# Patient Record
Sex: FEMALE | Race: WHITE | Employment: OTHER | URBAN - METROPOLITAN AREA
[De-identification: names, ages, dates, MRNs, and addresses within clinical notes are randomized per-mention and may not be internally consistent; named-entity substitution may affect disease eponyms.]

---

## 2017-04-16 ENCOUNTER — APPOINTMENT (OUTPATIENT)
Dept: CT IMAGING | Age: 82
DRG: 391 | End: 2017-04-16
Attending: PHYSICIAN ASSISTANT
Payer: MEDICARE

## 2017-04-16 ENCOUNTER — HOSPITAL ENCOUNTER (INPATIENT)
Age: 82
LOS: 10 days | Discharge: NURSING, REHAB OR LONG-TERM CARE | DRG: 391 | End: 2017-04-26
Attending: EMERGENCY MEDICINE | Admitting: INTERNAL MEDICINE
Payer: MEDICARE

## 2017-04-16 DIAGNOSIS — R10.84 ABDOMINAL PAIN, GENERALIZED: ICD-10-CM

## 2017-04-16 DIAGNOSIS — N17.9 ACUTE RENAL FAILURE, UNSPECIFIED ACUTE RENAL FAILURE TYPE (HCC): Primary | ICD-10-CM

## 2017-04-16 PROBLEM — R65.10 SIRS (SYSTEMIC INFLAMMATORY RESPONSE SYNDROME) (HCC): Status: ACTIVE | Noted: 2017-04-16

## 2017-04-16 PROBLEM — Z79.01 CHRONIC ANTICOAGULATION: Status: ACTIVE | Noted: 2017-04-16

## 2017-04-16 PROBLEM — I48.91 A-FIB (HCC): Chronic | Status: ACTIVE | Noted: 2017-04-16

## 2017-04-16 PROBLEM — K52.9 ENTERITIS: Status: ACTIVE | Noted: 2017-04-16

## 2017-04-16 PROBLEM — R79.89 ELEVATED LACTIC ACID LEVEL: Status: ACTIVE | Noted: 2017-04-16

## 2017-04-16 PROBLEM — G47.33 OSA (OBSTRUCTIVE SLEEP APNEA): Chronic | Status: ACTIVE | Noted: 2017-04-16

## 2017-04-16 PROBLEM — I48.91 A-FIB (HCC): Status: ACTIVE | Noted: 2017-04-16

## 2017-04-16 PROBLEM — N18.30 CKD (CHRONIC KIDNEY DISEASE) STAGE 3, GFR 30-59 ML/MIN (HCC): Chronic | Status: ACTIVE | Noted: 2017-04-16

## 2017-04-16 PROBLEM — Z79.01 CHRONIC ANTICOAGULATION: Chronic | Status: ACTIVE | Noted: 2017-04-16

## 2017-04-16 PROBLEM — D68.9 COAGULOPATHY (HCC): Status: ACTIVE | Noted: 2017-04-16

## 2017-04-16 LAB
ABO + RH BLD: NORMAL
ALBUMIN SERPL BCP-MCNC: 3.2 G/DL (ref 3.5–5)
ALBUMIN/GLOB SERPL: 0.8 {RATIO} (ref 1.1–2.2)
ALP SERPL-CCNC: 55 U/L (ref 45–117)
ALT SERPL-CCNC: 20 U/L (ref 12–78)
ANION GAP BLD CALC-SCNC: 11 MMOL/L (ref 5–15)
APPEARANCE UR: ABNORMAL
APTT PPP: >130 SEC (ref 22.1–32.5)
AST SERPL W P-5'-P-CCNC: 20 U/L (ref 15–37)
BACTERIA URNS QL MICRO: ABNORMAL /HPF
BASOPHILS # BLD AUTO: 0 K/UL (ref 0–0.1)
BASOPHILS # BLD: 0 % (ref 0–1)
BILIRUB SERPL-MCNC: 0.7 MG/DL (ref 0.2–1)
BILIRUB UR QL: NEGATIVE
BLOOD GROUP ANTIBODIES SERPL: NORMAL
BUN SERPL-MCNC: 46 MG/DL (ref 6–20)
BUN/CREAT SERPL: 20 (ref 12–20)
CALCIUM SERPL-MCNC: 9.2 MG/DL (ref 8.5–10.1)
CHLORIDE SERPL-SCNC: 101 MMOL/L (ref 97–108)
CO2 SERPL-SCNC: 27 MMOL/L (ref 21–32)
COLOR UR: ABNORMAL
CREAT SERPL-MCNC: 2.25 MG/DL (ref 0.55–1.02)
EOSINOPHIL # BLD: 0 K/UL (ref 0–0.4)
EOSINOPHIL NFR BLD: 0 % (ref 0–7)
EPITH CASTS URNS QL MICRO: ABNORMAL /LPF
ERYTHROCYTE [DISTWIDTH] IN BLOOD BY AUTOMATED COUNT: 14.7 % (ref 11.5–14.5)
GLOBULIN SER CALC-MCNC: 3.9 G/DL (ref 2–4)
GLUCOSE BLD STRIP.AUTO-MCNC: 145 MG/DL (ref 65–100)
GLUCOSE SERPL-MCNC: 208 MG/DL (ref 65–100)
GLUCOSE UR STRIP.AUTO-MCNC: NEGATIVE MG/DL
HCT VFR BLD AUTO: 35.5 % (ref 35–47)
HGB BLD-MCNC: 12 G/DL (ref 11.5–16)
HGB UR QL STRIP: NEGATIVE
INR PPP: 9.1 (ref 0.9–1.1)
KETONES UR QL STRIP.AUTO: NEGATIVE MG/DL
LACTATE SERPL-SCNC: 2.2 MMOL/L (ref 0.4–2)
LEUKOCYTE ESTERASE UR QL STRIP.AUTO: ABNORMAL
LIPASE SERPL-CCNC: 139 U/L (ref 73–393)
LYMPHOCYTES # BLD AUTO: 9 % (ref 12–49)
LYMPHOCYTES # BLD: 1.3 K/UL (ref 0.8–3.5)
MCH RBC QN AUTO: 30.5 PG (ref 26–34)
MCHC RBC AUTO-ENTMCNC: 33.8 G/DL (ref 30–36.5)
MCV RBC AUTO: 90.1 FL (ref 80–99)
MONOCYTES # BLD: 1.4 K/UL (ref 0–1)
MONOCYTES NFR BLD AUTO: 9 % (ref 5–13)
NEUTS SEG # BLD: 12.1 K/UL (ref 1.8–8)
NEUTS SEG NFR BLD AUTO: 82 % (ref 32–75)
NITRITE UR QL STRIP.AUTO: NEGATIVE
PH UR STRIP: 5 [PH] (ref 5–8)
PLATELET # BLD AUTO: 263 K/UL (ref 150–400)
POTASSIUM SERPL-SCNC: 4 MMOL/L (ref 3.5–5.1)
PROT SERPL-MCNC: 7.1 G/DL (ref 6.4–8.2)
PROT UR STRIP-MCNC: NEGATIVE MG/DL
PROTHROMBIN TIME: 98.9 SEC (ref 9–11.1)
RBC # BLD AUTO: 3.94 M/UL (ref 3.8–5.2)
RBC #/AREA URNS HPF: ABNORMAL /HPF (ref 0–5)
SERVICE CMNT-IMP: ABNORMAL
SODIUM SERPL-SCNC: 139 MMOL/L (ref 136–145)
SP GR UR REFRACTOMETRY: 1.02 (ref 1–1.03)
SPECIMEN EXP DATE BLD: NORMAL
THERAPEUTIC RANGE,PTTT: ABNORMAL SECS (ref 58–77)
TROPONIN I SERPL-MCNC: <0.04 NG/ML
UA: UC IF INDICATED,UAUC: ABNORMAL
UROBILINOGEN UR QL STRIP.AUTO: 0.2 EU/DL (ref 0.2–1)
WBC # BLD AUTO: 14.8 K/UL (ref 3.6–11)
WBC URNS QL MICRO: ABNORMAL /HPF (ref 0–4)

## 2017-04-16 PROCEDURE — 85025 COMPLETE CBC W/AUTO DIFF WBC: CPT | Performed by: NURSE PRACTITIONER

## 2017-04-16 PROCEDURE — 87086 URINE CULTURE/COLONY COUNT: CPT | Performed by: NURSE PRACTITIONER

## 2017-04-16 PROCEDURE — 74011250636 HC RX REV CODE- 250/636: Performed by: EMERGENCY MEDICINE

## 2017-04-16 PROCEDURE — 80053 COMPREHEN METABOLIC PANEL: CPT | Performed by: NURSE PRACTITIONER

## 2017-04-16 PROCEDURE — 96375 TX/PRO/DX INJ NEW DRUG ADDON: CPT

## 2017-04-16 PROCEDURE — 65270000029 HC RM PRIVATE

## 2017-04-16 PROCEDURE — 74011000258 HC RX REV CODE- 258: Performed by: EMERGENCY MEDICINE

## 2017-04-16 PROCEDURE — 85610 PROTHROMBIN TIME: CPT | Performed by: EMERGENCY MEDICINE

## 2017-04-16 PROCEDURE — 85730 THROMBOPLASTIN TIME PARTIAL: CPT | Performed by: EMERGENCY MEDICINE

## 2017-04-16 PROCEDURE — 87077 CULTURE AEROBIC IDENTIFY: CPT | Performed by: NURSE PRACTITIONER

## 2017-04-16 PROCEDURE — 82962 GLUCOSE BLOOD TEST: CPT

## 2017-04-16 PROCEDURE — 83605 ASSAY OF LACTIC ACID: CPT | Performed by: NURSE PRACTITIONER

## 2017-04-16 PROCEDURE — 74011250637 HC RX REV CODE- 250/637: Performed by: INTERNAL MEDICINE

## 2017-04-16 PROCEDURE — 36415 COLL VENOUS BLD VENIPUNCTURE: CPT | Performed by: NURSE PRACTITIONER

## 2017-04-16 PROCEDURE — 74011250636 HC RX REV CODE- 250/636: Performed by: PHYSICIAN ASSISTANT

## 2017-04-16 PROCEDURE — 83036 HEMOGLOBIN GLYCOSYLATED A1C: CPT | Performed by: INTERNAL MEDICINE

## 2017-04-16 PROCEDURE — 74176 CT ABD & PELVIS W/O CONTRAST: CPT

## 2017-04-16 PROCEDURE — 93005 ELECTROCARDIOGRAM TRACING: CPT

## 2017-04-16 PROCEDURE — 74011250636 HC RX REV CODE- 250/636: Performed by: INTERNAL MEDICINE

## 2017-04-16 PROCEDURE — 99285 EMERGENCY DEPT VISIT HI MDM: CPT

## 2017-04-16 PROCEDURE — 81001 URINALYSIS AUTO W/SCOPE: CPT | Performed by: NURSE PRACTITIONER

## 2017-04-16 PROCEDURE — 86900 BLOOD TYPING SEROLOGIC ABO: CPT | Performed by: EMERGENCY MEDICINE

## 2017-04-16 PROCEDURE — 96374 THER/PROPH/DIAG INJ IV PUSH: CPT

## 2017-04-16 PROCEDURE — 83690 ASSAY OF LIPASE: CPT | Performed by: NURSE PRACTITIONER

## 2017-04-16 PROCEDURE — 96361 HYDRATE IV INFUSION ADD-ON: CPT

## 2017-04-16 PROCEDURE — 84484 ASSAY OF TROPONIN QUANT: CPT | Performed by: PHYSICIAN ASSISTANT

## 2017-04-16 PROCEDURE — 87186 SC STD MICRODIL/AGAR DIL: CPT | Performed by: NURSE PRACTITIONER

## 2017-04-16 RX ORDER — OXYCODONE AND ACETAMINOPHEN 5; 325 MG/1; MG/1
1 TABLET ORAL
Status: DISCONTINUED | OUTPATIENT
Start: 2017-04-16 | End: 2017-04-21

## 2017-04-16 RX ORDER — ONDANSETRON 2 MG/ML
4 INJECTION INTRAMUSCULAR; INTRAVENOUS
Status: COMPLETED | OUTPATIENT
Start: 2017-04-16 | End: 2017-04-16

## 2017-04-16 RX ORDER — MAGNESIUM SULFATE 100 %
4 CRYSTALS MISCELLANEOUS AS NEEDED
Status: DISCONTINUED | OUTPATIENT
Start: 2017-04-16 | End: 2017-04-26 | Stop reason: HOSPADM

## 2017-04-16 RX ORDER — SODIUM CHLORIDE 9 MG/ML
100 INJECTION, SOLUTION INTRAVENOUS CONTINUOUS
Status: DISCONTINUED | OUTPATIENT
Start: 2017-04-16 | End: 2017-04-20

## 2017-04-16 RX ORDER — ACETAMINOPHEN 325 MG/1
650 TABLET ORAL
Status: DISCONTINUED | OUTPATIENT
Start: 2017-04-16 | End: 2017-04-26 | Stop reason: HOSPADM

## 2017-04-16 RX ORDER — NYSTATIN 100000 [USP'U]/G
POWDER TOPICAL
COMMUNITY

## 2017-04-16 RX ORDER — OMEPRAZOLE 20 MG/1
40 CAPSULE, DELAYED RELEASE ORAL DAILY
Status: DISCONTINUED | OUTPATIENT
Start: 2017-04-17 | End: 2017-04-16 | Stop reason: CLARIF

## 2017-04-16 RX ORDER — INSULIN LISPRO 100 [IU]/ML
INJECTION, SOLUTION INTRAVENOUS; SUBCUTANEOUS
Status: DISCONTINUED | OUTPATIENT
Start: 2017-04-16 | End: 2017-04-26 | Stop reason: HOSPADM

## 2017-04-16 RX ORDER — PRAVASTATIN SODIUM 20 MG/1
40 TABLET ORAL
Status: DISCONTINUED | OUTPATIENT
Start: 2017-04-16 | End: 2017-04-26 | Stop reason: HOSPADM

## 2017-04-16 RX ORDER — METOPROLOL SUCCINATE 25 MG/1
12.5 TABLET, EXTENDED RELEASE ORAL DAILY
Status: DISCONTINUED | OUTPATIENT
Start: 2017-04-17 | End: 2017-04-21

## 2017-04-16 RX ORDER — SODIUM CHLORIDE 0.9 % (FLUSH) 0.9 %
5-10 SYRINGE (ML) INJECTION EVERY 8 HOURS
Status: DISCONTINUED | OUTPATIENT
Start: 2017-04-16 | End: 2017-04-20

## 2017-04-16 RX ORDER — KETOROLAC TROMETHAMINE 30 MG/ML
15 INJECTION, SOLUTION INTRAMUSCULAR; INTRAVENOUS
Status: COMPLETED | OUTPATIENT
Start: 2017-04-16 | End: 2017-04-16

## 2017-04-16 RX ORDER — TRIAMCINOLONE ACETONIDE 1 MG/G
CREAM TOPICAL
COMMUNITY

## 2017-04-16 RX ORDER — DEXTROSE 50 % IN WATER (D50W) INTRAVENOUS SYRINGE
12.5-25 AS NEEDED
Status: DISCONTINUED | OUTPATIENT
Start: 2017-04-16 | End: 2017-04-26

## 2017-04-16 RX ORDER — HYDROMORPHONE HYDROCHLORIDE 1 MG/ML
0.5 INJECTION, SOLUTION INTRAMUSCULAR; INTRAVENOUS; SUBCUTANEOUS
Status: DISCONTINUED | OUTPATIENT
Start: 2017-04-16 | End: 2017-04-20

## 2017-04-16 RX ORDER — ACETAMINOPHEN 325 MG/1
TABLET ORAL
COMMUNITY

## 2017-04-16 RX ORDER — WARFARIN 2.5 MG/1
2.5 TABLET ORAL
Status: ON HOLD | COMMUNITY
End: 2017-04-26

## 2017-04-16 RX ORDER — PANTOPRAZOLE SODIUM 40 MG/1
40 TABLET, DELAYED RELEASE ORAL DAILY
Status: DISCONTINUED | OUTPATIENT
Start: 2017-04-17 | End: 2017-04-26 | Stop reason: HOSPADM

## 2017-04-16 RX ORDER — FEBUXOSTAT 40 MG/1
40 TABLET, FILM COATED ORAL DAILY
Status: DISCONTINUED | OUTPATIENT
Start: 2017-04-17 | End: 2017-04-26 | Stop reason: HOSPADM

## 2017-04-16 RX ORDER — SODIUM CHLORIDE 0.9 % (FLUSH) 0.9 %
5-10 SYRINGE (ML) INJECTION AS NEEDED
Status: DISCONTINUED | OUTPATIENT
Start: 2017-04-16 | End: 2017-04-26 | Stop reason: HOSPADM

## 2017-04-16 RX ADMIN — PIPERACILLIN SODIUM,TAZOBACTAM SODIUM 3.38 G: 3; .375 INJECTION, POWDER, FOR SOLUTION INTRAVENOUS at 20:16

## 2017-04-16 RX ADMIN — PHYTONADIONE 2.5 MG: 10 INJECTION, EMULSION INTRAMUSCULAR; INTRAVENOUS; SUBCUTANEOUS at 23:46

## 2017-04-16 RX ADMIN — SODIUM CHLORIDE 100 ML/HR: 900 INJECTION, SOLUTION INTRAVENOUS at 22:18

## 2017-04-16 RX ADMIN — SODIUM CHLORIDE 1000 ML: 900 INJECTION, SOLUTION INTRAVENOUS at 19:24

## 2017-04-16 RX ADMIN — ONDANSETRON 4 MG: 2 INJECTION INTRAMUSCULAR; INTRAVENOUS at 17:28

## 2017-04-16 RX ADMIN — ACETAMINOPHEN 650 MG: 325 TABLET ORAL at 22:15

## 2017-04-16 RX ADMIN — SODIUM CHLORIDE 1000 ML: 900 INJECTION, SOLUTION INTRAVENOUS at 17:28

## 2017-04-16 RX ADMIN — KETOROLAC TROMETHAMINE 15 MG: 30 INJECTION, SOLUTION INTRAMUSCULAR at 17:28

## 2017-04-16 NOTE — ED PROVIDER NOTES
HPI Comments: 80 y.o. female with past medical history significant for HTN, diabetes, high cholesterol, sleep apnea, peptic ulcer disease, GERD, stroke, and arrhythmia who presents from home with chief complaint of abdominal pain. Pt complains of onset of periumbilical abdominal pain since yesterday which has been progressively worsening. She reports that the pain is constant, non-radiating, and worsens upon palpation. Pt also complains of nausea and vomiting and states that she has a reduced appetite. Pt reports that she feels nauseated every time she attempts to eat and reports 1 episode of vomiting since onset of pain. Pt has hx of peptic ulcers and states that her current symptoms resemble those of when she had ulcers. She also has hx of blood clot approximately 3 years ago. She denies taking any medication for pain. Pt denies having any dysuria or abnormal leg swelling. Patient's surgical history includes appendectomy and hysterectomy. There are no other acute medical concerns at this time. Social hx: nonsmoker, no EtOH use, no drug use  Significant FMHx: heart disease  PCP: Radha Rees MD    Note written by Maury Álvarez, as dictated by Franko Love PA-C 5:16 PM      The history is provided by the patient. No  was used.         Past Medical History:   Diagnosis Date    Arrhythmia     Irregular HR-\"beats fast\"    Diabetes ([address]     GERD (gastroesophageal reflux disease)     High cholesterol     Hx of blood clots     Hypertension     PUD (peptic ulcer disease)     Sleep apnea     Will not use mask    Stroke ([address]     TIA       Past Surgical History:   Procedure Laterality Date    HX APPENDECTOMY      HX BACK SURGERY      lower back    HX HEMORRHOIDECTOMY      HX HYSTERECTOMY      HX ORTHOPAEDIC      knee surgery    HX ORTHOPAEDIC      carpal tunnel surgery    HX OTHER SURGICAL      HX OTHER SURGICAL  10/13/15    TTE: EF 55-60%, LA dilated         Family History:   Problem Relation Age of Onset    Parkinsonism Mother     Cancer Father      lung       Social History     Social History    Marital status:      Spouse name: N/A    Number of children: N/A    Years of education: N/A     Occupational History    Not on file. Social History Main Topics    Smoking status: Never Smoker    Smokeless tobacco: Never Used    Alcohol use No    Drug use: No    Sexual activity: Not on file     Other Topics Concern    Not on file     Social History Narrative         ALLERGIES: Accupril [quinapril]; Allopurinol; Demerol [meperidine]; Morphine; and Sulfamethoxazole-trimethoprim    Review of Systems   Constitutional: Negative for chills, diaphoresis and fever. HENT: Negative for congestion, postnasal drip, rhinorrhea and sore throat. Eyes: Negative for photophobia, discharge, redness and visual disturbance. Respiratory: Negative for cough, chest tightness, shortness of breath and wheezing. Cardiovascular: Negative for chest pain, palpitations and leg swelling. Gastrointestinal: Positive for abdominal pain, nausea and vomiting. Negative for abdominal distention, blood in stool, constipation and diarrhea. Genitourinary: Negative for difficulty urinating, dysuria, frequency, hematuria and urgency. Musculoskeletal: Negative for arthralgias, back pain, joint swelling and myalgias. Skin: Negative for color change and rash. Neurological: Negative for dizziness, speech difficulty, weakness, light-headedness, numbness and headaches. Psychiatric/Behavioral: Negative for confusion. The patient is not nervous/anxious. All other systems reviewed and are negative. Vitals:    04/16/17 1700   BP: 114/62   Pulse: (!) 113   Resp: 18   Temp: 98.2 °F (36.8 °C)   SpO2: 94%   Weight: 79.4 kg (175 lb)   Height: 5' 3\" (1.6 m)            Physical Exam   Constitutional: She is oriented to person, place, and time. She appears well-developed and well-nourished. No distress. HENT:   Head: Normocephalic and atraumatic. Right Ear: External ear normal.   Left Ear: External ear normal.   Nose: Nose normal.   Mouth/Throat: Oropharynx is clear and moist.   Eyes: Conjunctivae and EOM are normal. Pupils are equal, round, and reactive to light. No scleral icterus. Neck: Normal range of motion. Neck supple. No JVD present. No tracheal deviation present. No thyromegaly present. Cardiovascular: Normal rate, regular rhythm and normal heart sounds. Exam reveals no gallop and no friction rub. No murmur heard. Pulmonary/Chest: Effort normal and breath sounds normal. No respiratory distress. She has no wheezes. She has no rales. She exhibits no tenderness. Abdominal: Soft. Bowel sounds are normal. She exhibits no distension and no mass. There is tenderness (upon palpation) in the periumbilical area. There is no rebound and no guarding. Musculoskeletal: Normal range of motion. She exhibits no edema or tenderness. Lymphadenopathy:     She has no cervical adenopathy. Neurological: She is alert and oriented to person, place, and time. She has normal strength. She displays no atrophy and no tremor. No cranial nerve deficit. She exhibits normal muscle tone. Coordination and gait normal.   Skin: Skin is warm and dry. No rash noted. She is not diaphoretic. No erythema. Psychiatric: She has a normal mood and affect. Her behavior is normal. Judgment and thought content normal.   Nursing note and vitals reviewed. Note written by Maury Rios, as dictated by Laura Landa DO 5:16 PM    MDM  Number of Diagnoses or Management Options  Diagnosis management comments: Pt presents with mid abdominal pain. CT revealed small bowel thickening. Infections vs inflammatory vs ischemia. Spoke to hospitalist Dr. Gini Sandhoff who has agreed to admit the pt. Reviewed treatment plan with attending and they agree.   Vania Feliz PA-C      ED Course       Procedures

## 2017-04-16 NOTE — ED TRIAGE NOTES
Patient arrives with the complaint of mid abdominal pain since yesterday associated with some nausea and vomiting. States that the pain is constant, trying to eat does not increase the pain but causes nausea and vomiting. Denies fevers, chest pain and diarrhea.

## 2017-04-16 NOTE — PROGRESS NOTES
[facility]: MED RECONCILIATION    Comments/Recommendations:   Patient states he is compliant with taking his medication as prescribed. Medication(s) ADDED to PTA list:  1. Triamcinolone 0.1% cream prn    Medication(s) REMOVED from PTA list:  1. Glipizide SR 2.5 mg daily    Medication(s) ADJUSTED on PTA list:  1. Vit D frequency changed to \"daily\". 2. Nystatin powder changed to \"prn\". 3. Warfarin dose changed to 2.5 mg Sat/Sun/Tue/Thu and 5 mg Mon/Wed/Fri. Information obtained from: Home med list    Significant PMH/Disease States:   Past Medical History:   Diagnosis Date    Arrhythmia     Irregular HR-\"beats fast\"    Diabetes ([address]     GERD (gastroesophageal reflux disease)     High cholesterol     Hx of blood clots     Hypertension     PUD (peptic ulcer disease)     Sleep apnea     Will not use mask    Stroke [facility])     TIA       Chief Complaint for this Admission:   Chief Complaint   Patient presents with    Abdominal Pain    Nausea       Allergies: Accupril [quinapril]; Allopurinol; Demerol [meperidine]; Morphine; and Sulfamethoxazole-trimethoprim    Prior to Admission Medications:     Medication Documentation Review Audit      Prior to Admission Medications   Prescriptions Last Dose Informant Patient Reported? Taking?   acetaminophen (TYLENOL) 325 mg tablet 4/15/2017 at Unknown time  Yes Yes   Sig: Take  by mouth every four (4) hours as needed for Pain. ascorbic acid (VITAMIN C) 500 mg tablet 4/16/2017 at 0930  Yes Yes   Sig: Take 500 mg by mouth three (3) times daily. b complex vitamins tablet 4/16/2017 at 0930  Yes Yes   Sig: Take 1 Tab by mouth daily. calcium carbonate-vitamin D3 600 mg (1,500 mg)-800 unit chew 4/16/2017 at 0930  Yes Yes   Sig: Take 1 Tab by mouth daily. cholecalciferol, vitamin D3, (VITAMIN D3) 2,000 unit tab 4/16/2017 at 0930  Yes Yes   Sig: Take 1 Tab by mouth daily. febuxostat (ULORIC) 40 mg tab tablet 4/16/2017 at 0930  Yes Yes   Sig: Take 40 mg by mouth daily. ferrous gluconate (FERGON) 240 mg (27 mg iron) tablet 4/16/2017 at 0930  Yes Yes   Sig: Take 240 mg by mouth three (3) times daily (with meals). fish oil-dha-epa 1,200-144-216 mg cap 4/16/2017 at 0930  Yes Yes   Sig: Take  by mouth daily. folic acid 544 mcg tablet 4/16/2017 at 0930  Yes Yes   Sig: Take 400 mcg by mouth daily. furosemide (LASIX) 40 mg tablet 4/16/2017 at 0930  Yes Yes   Sig: Take 40 mg by mouth two (2) times a day. metoprolol succinate (TOPROL XL) 25 mg XL tablet 4/16/2017 at 0930  Yes Yes   Sig: Take 12.5 mg by mouth daily. nystatin (MYCOSTATIN) powder   Yes Yes   Sig: Apply  to affected area two (2) times daily as needed. omeprazole (PRILOSEC) 40 mg capsule 4/16/2017 at 0930  Yes Yes   Sig: Take 40 mg by mouth daily. pravastatin (PRAVACHOL) 40 mg tablet 4/15/2017 at Unknown time  Yes Yes   Sig: Take 40 mg by mouth nightly. traMADol (ULTRAM) 50 mg tablet 4/9/2017 at Unknown time  Yes Yes   Sig: Take 50 mg by mouth every six (6) hours as needed for Pain.   triamcinolone acetonide (KENALOG) 0.1 % topical cream   Yes Yes   Sig: Apply  to affected area two (2) times daily as needed for Skin Irritation. use thin layer   trolamine salicylate (MYOFLEX) 10 % topical cream   Yes Yes   Sig: Apply  to affected area as needed. Apply to knees   warfarin (COUMADIN) 2.5 mg tablet 4/15/2017 at 1900  Yes Yes   Sig: Take 2.5 mg by mouth every Tuesday, Thursday, Saturday & Sunday. warfarin (COUMADIN) 5 mg tablet 4/14/2017 at 1900  Yes Yes   Sig: Take 5 mg by mouth every Monday, Wednesday, Friday.       Facility-Administered Medications: None                 Mary Jane Rico, PHARMD   Contact: [phone]

## 2017-04-16 NOTE — IP AVS SNAPSHOT
Delmis Moser 
 
 
 [address] 
[phone] Patient: Revonda Meigs MRN: [id number] :1924 You are allergic to the following Allergen Reactions Accupril (Quinapril) Unable to Obtain Allopurinol Unknown (comments) Demerol (Meperidine) Hives Morphine Hives Sulfamethoxazole-Trimethoprim Myalgia Recent Documentation Height Weight Breastfeeding? BMI OB Status Smoking Status 1.6 m 85.2 kg No 33.27 kg/m2 Hysterectomy Never Smoker Unresulted Labs Order Current Status GLUCOSE, POC In process Emergency Contacts Name Discharge Info Relation Home Work Mobile [address] [facility] CAREGIVER [3] Child [2] [phone] About your hospitalization You were admitted on:  2017 You last received care in the:  [facility] 4M POST SURG ORT 1 You were discharged on:  2017 Unit phone number:  [phone] Why you were hospitalized Your primary diagnosis was:  Enteritis Your diagnoses also included:  Htn (Hypertension), High Cholesterol, Dm (Diabetes Mellitus) ([facility]), Arf (Acute Renal Failure) ([facility]), Ckd (Chronic Kidney Disease) Stage 3, Gfr 30-59 Ml/Min, Sirs (Systemic Inflammatory Response Syndrome) ([facility]), Elevated Lactic Acid Level, Ganga (Obstructive Sleep Apnea), A-Fib ([facility]), Chronic Anticoagulation, Coagulopathy ([facility]), Pyuria Providers Seen During Your Hospitalizations Provider Role Specialty Primary office phone Nani Severance, DO Attending Provider Emergency Medicine [phone] Dany Wood MD Attending Provider Internal Medicine [phone] Viridiana Johnson MD Attending Provider Internal Medicine [phone] Layla Díaz MD Attending Provider Internal Medicine [phone] Ryland Stroud MD Attending Provider Internal Medicine [phone] Your Primary Care Physician (PCP) Primary Care Physician Office Phone Office [facility]ax [facility] [phone] Follow-up Information Follow up With Details Comments Contact Info Bailey Kirk   [address] [facility] [address] 
[phone] Current Discharge Medication List  
  
START taking these medications Dose & Instructions Dispensing Information Comments Morning Noon Evening Bedtime  
 levoFLOXacin 750 mg tablet Commonly known as:  Remus Husky Your last dose was: Your next dose is:    
   
   
 Dose:  750 mg Take 1 Tab by mouth every fourty-eight (48) hours. Refills:  0  
     
   
   
   
  
 metoprolol tartrate 25 mg tablet Commonly known as:  LOPRESSOR Your last dose was: Your next dose is:    
   
   
 Dose:  25 mg Take 1 Tab by mouth two (2) times a day. Refills:  0  
     
   
   
   
  
 metroNIDAZOLE 500 mg tablet Commonly known as:  FLAGYL Your last dose was: Your next dose is:    
   
   
 Dose:  500 mg Take 1 Tab by mouth every six (6) hours for 8 days. Quantity:  32 Tab Refills:  0  
     
   
   
   
  
 polyethylene glycol 17 gram packet Commonly known as:  Pimento Cost Your last dose was: Your next dose is:    
   
   
 Dose:  17 g Take 1 Packet by mouth two (2) times a day. Refills:  0  
     
   
   
   
  
 senna-docusate 8.6-50 mg per tablet Commonly known as:  Jean Claude Mole Your last dose was: Your next dose is:    
   
   
 Dose:  1 Tab Take 1 Tab by mouth two (2) times a day. Refills:  0 CONTINUE these medications which have CHANGED Dose & Instructions Dispensing Information Comments Morning Noon Evening Bedtime  
 warfarin 1 mg tablet Commonly known as:  COUMADIN What changed:   
- medication strength 
- how much to take - when to take this - Another medication with the same name was removed. Continue taking this medication, and follow the directions you see here. Your last dose was: Your next dose is:    
   
   
 Dose:  1.5 mg Take 1.5 Tabs by mouth daily. Indications: PREVENT THROMBOEMBOLISM IN CHRONIC ATRIAL FIBRILLATION Refills:  0 CONTINUE these medications which have NOT CHANGED Dose & Instructions Dispensing Information Comments Morning Noon Evening Bedtime  
 b complex vitamins tablet Your last dose was: Your next dose is:    
   
   
 Dose:  1 Tab Take 1 Tab by mouth daily. Refills:  0  
     
   
   
   
  
 calcium carbonate-vitamin D3 600 mg (1,500 mg)-800 unit [place] Your last dose was: Your next dose is:    
   
   
 Dose:  1 Tab Take 1 Tab by mouth daily. Refills:  0  
     
   
   
   
  
 ferrous gluconate 240 mg (27 mg iron) tablet Commonly known as:  P[employer] Your last dose was: Your next dose is:    
   
   
 Dose:  240 mg Take 240 mg by mouth three (3) times daily (with meals). Refills:  0  
     
   
   
   
  
 fish oil-dha-epa 1,200-144-216 mg Cap Your last dose was: Your next dose is: Take  by mouth daily. Refills:  0  
     
   
   
   
  
 folic acid 301 mcg tablet Your last dose was: Your next dose is:    
   
   
 Dose:  400 mcg Take 400 mcg by mouth daily. Refills:  0 MYOFLEX 10 % topical cream  
Generic drug:  trolamine salicylate Your last dose was: Your next dose is:    
   
   
 Apply  to affected area as needed. Apply to knees Refills:  0  
     
   
   
   
  
 nystatin powder Commonly known as:  MYCOSTATIN Your last dose was: Your next dose is:    
   
   
 Apply  to affected area two (2) times daily as needed. Refills:  0  
     
   
   
   
  
 pravastatin 40 mg tablet Commonly known as:  PRAVACHOL Your last dose was: Your next dose is:    
   
   
 Dose:  40 mg Take 40 mg by mouth nightly. Refills:  0 PriLOSEC 40 mg capsule Generic drug:  omeprazole Your last dose was: Your next dose is:    
   
   
 Dose:  40 mg Take 40 mg by mouth daily. Refills:  0  
     
   
   
   
  
 traMADol 50 mg tablet Commonly known as:  ULTRAM  
   
Your last dose was: Your next dose is:    
   
   
 Dose:  50 mg Take 50 mg by mouth every six (6) hours as needed for Pain. Refills:  0  
     
   
   
   
  
 triamcinolone acetonide 0.1 % topical cream  
Commonly known as:  KENALOG Your last dose was: Your next dose is:    
   
   
 Apply  to affected area two (2) times daily as needed for Skin Irritation. use thin layer Refills:  0  
     
   
   
   
  
 TYLENOL 325 mg tablet Generic drug:  acetaminophen Your last dose was: Your next dose is: Take  by mouth every four (4) hours as needed for Pain. Refills:  0  
     
   
   
   
  
 ULORIC 40 mg Tab tablet Generic drug:  febuxostat Your last dose was: Your next dose is:    
   
   
 Dose:  40 mg Take 40 mg by mouth daily. Refills:  0  
     
   
   
   
  
 VITAMIN C 500 mg tablet Generic drug:  ascorbic acid (vitamin C) Your last dose was: Your next dose is:    
   
   
 Dose:  500 mg Take 500 mg by mouth three (3) times daily. Refills:  0  
     
   
   
   
  
 VITAMIN D3 2,000 unit Tab Generic drug:  cholecalciferol (vitamin D3) Your last dose was: Your next dose is:    
   
   
 Dose:  1 Tab Take 1 Tab by mouth daily. Refills:  0 STOP taking these medications LASIX 40 mg tablet Generic drug:  furosemide TOPROL XL 25 mg XL tablet Generic drug:  metoprolol succinate Where to Get Your Medications These medications were sent to [address]  [address] [place] [id number] Phone:  [phone]  
  metroNIDAZOLE 500 mg tablet Discharge Instructions HOSPITALIST DISCHARGE INSTRUCTIONS 
NAME: Bill London :  1924 MRN:  [id number] Date/Time:  2017 3:58 PM 
 
ADMIT DATE: 2017 DISCHARGE DATE: 2017 DISCHARGE DIAGNOSIS: 
Enteritis MEDICATIONS: 
· It is important that you take the medication exactly as they are prescribed. · Keep your medication in the bottles provided by the pharmacist and keep a list of the medication names, dosages, and times to be taken in your wallet. · Do not take other medications without consulting your doctor. Pain Management: per above medications What to do at [facility] Recommended diet:  Cardiac Diet and Diabetic Diet Recommended activity: Activity as tolerated If you experience any of the following symptoms then please call your primary care physician or return to the emergency room if you cannot get hold of your doctor: 
Fever, chills, nausea, vomiting, diarrhea, change in mentation, falling, bleeding, shortness of breath Follow Up: Follow-up Information Follow up With Details Comments Contact Info [address]   [place] [address] [address] [place] [address] Road 
[phone] Information obtained by : 
I understand that if any problems occur once I am at home I am to contact my physician. I understand and acknowledge receipt of the instructions indicated above. Physician's or R.N.'s Signature                                                                  Date/Time Patient or Representative Signature                                                          Date/Time 
 
 
 
 
Nutrition Recommendations for Discharge: 
 
 
 
 
 
Continue Oral Nutrition Supplements at discharge:  
Glucerna Advance or Glucerna Shake 1-2 times per day 
for 30 days unless otherwise directed by your Primary Care Physician. Catalina Thompson RD Discharge Orders None [employer] Announcement We are excited to announce that we are making your provider's discharge notes available to you in [employer]. You will see these notes when they are completed and signed by the physician that discharged you from your recent hospital stay. If you have any questions or concerns about any information you see in [employer], please call the Health Information Department where you were seen or reach out to your Primary Care Provider for more information about your plan of care. Introducing [facility] & HEALTH SERVICES! [facility] introduces [employer] patient portal. Now you can access parts of your medical record, email your doctor's office, and request medication refills online. 1. In your internet browser, go to [email or web address] 2. Click on the First Time User? Click Here link in the Sign In box. You will see the New Member Sign Up page. 3. Enter your [employer] Access Code exactly as it appears below. You will not need to use this code after youve completed the sign-up process. If you do not sign up before the expiration date, you must request a new code. · [employer] Access Code: [email or web address] Expires: 7/15/2017  5:19 PM 
 
4. Enter the last four digits of your Social Security Number (xxxx) and Date of Birth (mm/dd/yyyy) as indicated and click Submit. You will be taken to the next sign-up page. 5. Create a [employer] ID.  This will be your [employer] login ID and cannot be changed, so think of one that is secure and easy to remember. 6. Create a [employer] password. You can change your password at any time. 7. Enter your Password Reset Question and Answer. This can be used at a later time if you forget your password. 8. Enter your e-mail address. You will receive e-mail notification when new information is available in [address] 9. Click Sign Up. You can now view and download portions of your medical record. 10. Click the Download Summary menu link to download a portable copy of your medical information. If you have questions, please visit the Frequently Asked Questions section of the [employer] website. Remember, [employer] is NOT to be used for urgent needs. For medical emergencies, dial 911. Now available from your iPhone and Android! General Information Please provide this summary of care documentation to your next provider. Patient Signature:  ____________________________________________________________ Date:  ____________________________________________________________  
  
Jim Bough Provider Signature:  ____________________________________________________________ Date:  ____________________________________________________________

## 2017-04-17 PROBLEM — R82.81 PYURIA: Status: ACTIVE | Noted: 2017-04-17

## 2017-04-17 LAB
ANION GAP BLD CALC-SCNC: 8 MMOL/L (ref 5–15)
ATRIAL RATE: 66 BPM
BUN SERPL-MCNC: 46 MG/DL (ref 6–20)
BUN/CREAT SERPL: 20 (ref 12–20)
CALCIUM SERPL-MCNC: 8.1 MG/DL (ref 8.5–10.1)
CALCULATED R AXIS, ECG10: -42 DEGREES
CALCULATED T AXIS, ECG11: 69 DEGREES
CHLORIDE SERPL-SCNC: 106 MMOL/L (ref 97–108)
CO2 SERPL-SCNC: 27 MMOL/L (ref 21–32)
CREAT SERPL-MCNC: 2.34 MG/DL (ref 0.55–1.02)
DIAGNOSIS, 93000: NORMAL
ERYTHROCYTE [DISTWIDTH] IN BLOOD BY AUTOMATED COUNT: 15.1 % (ref 11.5–14.5)
EST. AVERAGE GLUCOSE BLD GHB EST-MCNC: 143 MG/DL
GLUCOSE BLD STRIP.AUTO-MCNC: 111 MG/DL (ref 65–100)
GLUCOSE BLD STRIP.AUTO-MCNC: 118 MG/DL (ref 65–100)
GLUCOSE BLD STRIP.AUTO-MCNC: 142 MG/DL (ref 65–100)
GLUCOSE BLD STRIP.AUTO-MCNC: 153 MG/DL (ref 65–100)
GLUCOSE SERPL-MCNC: 144 MG/DL (ref 65–100)
HBA1C MFR BLD: 6.6 % (ref 4.2–6.3)
HCT VFR BLD AUTO: 28.2 % (ref 35–47)
HGB BLD-MCNC: 9.4 G/DL (ref 11.5–16)
INR PPP: 10 (ref 0.9–1.1)
LACTATE SERPL-SCNC: 0.8 MMOL/L (ref 0.4–2)
MAGNESIUM SERPL-MCNC: 1.6 MG/DL (ref 1.6–2.4)
MCH RBC QN AUTO: 30.6 PG (ref 26–34)
MCHC RBC AUTO-ENTMCNC: 33.3 G/DL (ref 30–36.5)
MCV RBC AUTO: 91.9 FL (ref 80–99)
PHOSPHATE SERPL-MCNC: 4.5 MG/DL (ref 2.6–4.7)
PLATELET # BLD AUTO: 214 K/UL (ref 150–400)
POTASSIUM SERPL-SCNC: 3.9 MMOL/L (ref 3.5–5.1)
PROTHROMBIN TIME: 108.3 SEC (ref 9–11.1)
Q-T INTERVAL, ECG07: 310 MS
QRS DURATION, ECG06: 90 MS
QTC CALCULATION (BEZET), ECG08: 447 MS
RBC # BLD AUTO: 3.07 M/UL (ref 3.8–5.2)
SERVICE CMNT-IMP: ABNORMAL
SODIUM SERPL-SCNC: 141 MMOL/L (ref 136–145)
VENTRICULAR RATE, ECG03: 125 BPM
WBC # BLD AUTO: 11.6 K/UL (ref 3.6–11)

## 2017-04-17 PROCEDURE — 74011250637 HC RX REV CODE- 250/637: Performed by: INTERNAL MEDICINE

## 2017-04-17 PROCEDURE — 82962 GLUCOSE BLOOD TEST: CPT

## 2017-04-17 PROCEDURE — 74011000258 HC RX REV CODE- 258: Performed by: INTERNAL MEDICINE

## 2017-04-17 PROCEDURE — 65270000029 HC RM PRIVATE

## 2017-04-17 PROCEDURE — 85027 COMPLETE CBC AUTOMATED: CPT | Performed by: INTERNAL MEDICINE

## 2017-04-17 PROCEDURE — 97116 GAIT TRAINING THERAPY: CPT

## 2017-04-17 PROCEDURE — 84100 ASSAY OF PHOSPHORUS: CPT | Performed by: INTERNAL MEDICINE

## 2017-04-17 PROCEDURE — 83735 ASSAY OF MAGNESIUM: CPT | Performed by: INTERNAL MEDICINE

## 2017-04-17 PROCEDURE — 85610 PROTHROMBIN TIME: CPT | Performed by: INTERNAL MEDICINE

## 2017-04-17 PROCEDURE — 36415 COLL VENOUS BLD VENIPUNCTURE: CPT | Performed by: INTERNAL MEDICINE

## 2017-04-17 PROCEDURE — 83605 ASSAY OF LACTIC ACID: CPT | Performed by: INTERNAL MEDICINE

## 2017-04-17 PROCEDURE — 97161 PT EVAL LOW COMPLEX 20 MIN: CPT

## 2017-04-17 PROCEDURE — 74011250636 HC RX REV CODE- 250/636: Performed by: INTERNAL MEDICINE

## 2017-04-17 PROCEDURE — 80048 BASIC METABOLIC PNL TOTAL CA: CPT | Performed by: INTERNAL MEDICINE

## 2017-04-17 RX ORDER — PHYTONADIONE 5 MG/1
2.5 TABLET ORAL
Status: DISCONTINUED | OUTPATIENT
Start: 2017-04-17 | End: 2017-04-17

## 2017-04-17 RX ADMIN — Medication 10 ML: at 22:02

## 2017-04-17 RX ADMIN — FEBUXOSTAT 40 MG: 40 TABLET ORAL at 08:13

## 2017-04-17 RX ADMIN — PANTOPRAZOLE SODIUM 40 MG: 40 TABLET, DELAYED RELEASE ORAL at 08:13

## 2017-04-17 RX ADMIN — PIPERACILLIN SODIUM,TAZOBACTAM SODIUM 3.38 G: 3; .375 INJECTION, POWDER, FOR SOLUTION INTRAVENOUS at 20:23

## 2017-04-17 RX ADMIN — PHYTONADIONE 2.5 MG: 10 INJECTION, EMULSION INTRAMUSCULAR; INTRAVENOUS; SUBCUTANEOUS at 12:44

## 2017-04-17 RX ADMIN — METOPROLOL SUCCINATE 12.5 MG: 25 TABLET, FILM COATED, EXTENDED RELEASE ORAL at 08:13

## 2017-04-17 RX ADMIN — PIPERACILLIN SODIUM,TAZOBACTAM SODIUM 3.38 G: 3; .375 INJECTION, POWDER, FOR SOLUTION INTRAVENOUS at 08:15

## 2017-04-17 RX ADMIN — SODIUM CHLORIDE 100 ML/HR: 900 INJECTION, SOLUTION INTRAVENOUS at 08:12

## 2017-04-17 RX ADMIN — PRAVASTATIN SODIUM 40 MG: 20 TABLET ORAL at 00:04

## 2017-04-17 RX ADMIN — PRAVASTATIN SODIUM 40 MG: 20 TABLET ORAL at 22:02

## 2017-04-17 NOTE — PROGRESS NOTES
Orders acknowledged and chart reviewed. Patient with an INR of 10. Will hold OT evaluation until INR is therapeutic. Thank you.   Dominic Vale, OTR/L

## 2017-04-17 NOTE — PROGRESS NOTES
[facility] Dosing Services: 4/16/17    The following medication: Zosyn was automatically dose-adjusted per [facility] P&T Committee Protocol, with respect to renal function. Dosage changed to:  Zosyn 3.375gm every 12 hrs      Previous Regimen Zosyn 2.25gm every 8 hrs   Serum Creatinine Lab Results   Component Value Date/Time    Creatinine 2.25 04/16/2017 05:24 PM       Creatinine Clearance Estimated Creatinine Clearance: 15.9 mL/min (based on Cr of 2.25). BUN Lab Results   Component Value Date/Time    BUN 46 04/16/2017 05:24 PM           Additional notes:      Pharmacy to continue to monitor patient daily. Will make dosage adjustments based upon changing renal function. Signed Philomena ANN  [address] [place] [address] information:  [phone]

## 2017-04-17 NOTE — H&P
[place] [address]  [phone]    Admission History and Physical      NAME:  Geovany Vega   :   1924   MRN:  [id number]     PCP:  Arin Eid MD     Date/Time:  2017         Subjective:     CHIEF COMPLAINT: abdominal pain     HISTORY OF PRESENT ILLNESS:     Ms. Baldev Johnson is a 80 y.o.  female who is admitted with enteritis. Ms. Bladev Johnson presented to the Emergency Department this PM complaining of abdominal pain: started yesterday, periumbilical, constant, moderate, associated with N/V    History obtained from child, chart review and the patient. Previous records reviewed    Past Medical History:   Diagnosis Date    Arrhythmia     Irregular HR-\"beats fast\"    CKD (chronic kidney disease) stage 3, GFR 30-59 ml/min 2017    Diabetes ([address]     GERD (gastroesophageal reflux disease)     High cholesterol     Hx of blood clots     Hypertension     PUD (peptic ulcer disease)     Sleep apnea     Will not use mask    Stroke ([address]     TIA        Past Surgical History:   Procedure Laterality Date    HX APPENDECTOMY      HX BACK SURGERY      lower back    HX HEMORRHOIDECTOMY      HX HYSTERECTOMY      HX ORTHOPAEDIC      knee surgery    HX ORTHOPAEDIC      carpal tunnel surgery    HX OTHER SURGICAL      HX OTHER SURGICAL  10/13/15    TTE: EF 55-60%, LA dilated       Social History   Substance Use Topics    Smoking status: Never Smoker    Smokeless tobacco: Never Used    Alcohol use No        Family History   Problem Relation Age of Onset    Parkinsonism Mother     Cancer Father      lung        Allergies   Allergen Reactions    Accupril [Quinapril] Unable to Obtain    Allopurinol Unknown (comments)    Demerol [Meperidine] Hives    Morphine Hives    Sulfamethoxazole-Trimethoprim Myalgia        Prior to Admission medications    Medication Sig Start Date End Date Taking?  Authorizing Provider acetaminophen (TYLENOL) 325 mg tablet Take  by mouth every four (4) hours as needed for Pain. Yes Rose Sherman MD   warfarin (COUMADIN) 2.5 mg tablet Take 2.5 mg by mouth every Tuesday, Thursday, Saturday & Sunday. Yes Historical Provider   triamcinolone acetonide (KENALOG) 0.1 % topical cream Apply  to affected area two (2) times daily as needed for Skin Irritation. use thin layer   Yes Historical Provider   calcium carbonate-vitamin D3 600 mg (1,500 mg)-800 unit chew Take 1 Tab by mouth daily. Yes Historical Provider   nystatin (MYCOSTATIN) powder Apply  to affected area two (2) times daily as needed. Yes Historical Provider   febuxostat (ULORIC) 40 mg tab tablet Take 40 mg by mouth daily. Yes Historical Provider   warfarin (COUMADIN) 5 mg tablet Take 5 mg by mouth every Monday, Wednesday, Friday. Yes Historical Provider   furosemide (LASIX) 40 mg tablet Take 40 mg by mouth two (2) times a day. Yes Historical Provider   metoprolol succinate (TOPROL XL) 25 mg XL tablet Take 12.5 mg by mouth daily. Yes Historical Provider   ferrous gluconate (FERGON) 240 mg (27 mg iron) tablet Take 240 mg by mouth three (3) times daily (with meals). Yes Historical Provider   ascorbic acid (VITAMIN C) 500 mg tablet Take 500 mg by mouth three (3) times daily. Yes Historical Provider   b complex vitamins tablet Take 1 Tab by mouth daily. Yes Historical Provider   cholecalciferol, vitamin D3, (VITAMIN D3) 2,000 unit tab Take 1 Tab by mouth daily. Yes Historical Provider   folic acid 403 mcg tablet Take 400 mcg by mouth daily. Yes Historical Provider   omeprazole (PRILOSEC) 40 mg capsule Take 40 mg by mouth daily. Yes Historical Provider   fish oil-dha-epa 1,200-144-216 mg cap Take  by mouth daily. Yes Historical Provider   traMADol (ULTRAM) 50 mg tablet Take 50 mg by mouth every six (6) hours as needed for Pain.    Yes Historical Provider   trolamine salicylate (MYOFLEX) 10 % topical cream Apply  to affected area as needed. Apply to knees   Yes Historical Provider   pravastatin (PRAVACHOL) 40 mg tablet Take 40 mg by mouth nightly.    Yes Phys Other, MD         Review of Systems:  (bold if positive, if negative)    Gen:  Eyes:  ENT:  CVS:  Pulm:  GI:  Abdominal pain, nausea, emesis  :    MS:  Skin:  Psych:  Endo:    Hem:  Renal:    Neuro:            Objective:      VITALS:    Vital signs reviewed; most recent are:    Visit Vitals    /46    Pulse (!) 118    Temp 98.2 °F (36.8 °C)    Resp 25    Ht 5' 3\" (1.6 m)    Wt 79.4 kg (175 lb)    SpO2 95%    BMI 31 kg/m2     SpO2 Readings from Last 6 Encounters:   04/16/17 95%   12/22/16 97%   05/27/16 95%   12/09/15 100%   11/20/15 96%   10/16/15 94%        No intake or output data in the 24 hours ending 04/16/17 2031         Exam:     Physical Exam:    Gen: Well-developed, well-nourished, in no acute distress  HEENT:  Pink conjunctivae, PERRL, hearing intact to voice, moist mucous membranes  Neck: Supple, without masses, thyroid non-tender  Resp: No accessory muscle use, clear breath sounds without wheezes rales or rhonchi  Card: No murmurs, tachy, irreg S1, S2 without thrills, bruits or peripheral edema  Abd:  Soft, tender in epigastrium, non-distended, normoactive bowel sounds are present, no palpable organomegaly and no detectable hernias  Lymph:  No cervical or inguinal adenopathy  Musc: No cyanosis or clubbing  Skin: No rashes or ulcers, skin turgor is good  Neuro:  Cranial nerves are grossly intact, no focal motor weakness, follows commands appropriately  Psych:  Good insight, oriented to person, place and time, alert             Labs:    Recent Labs      04/16/17   1724   WBC  14.8*   HGB  12.0   HCT  35.5   PLT  263     Recent Labs      04/16/17   1724   NA  139   K  4.0   CL  101   CO2  27   GLU  208*   BUN  46*   CREA  2.25*   CA  9.2   ALB  3.2*   TBILI  0.7   SGOT  20   ALT  20     Lab Results   Component Value Date/Time    Glucose (POC) 106 12/09/2015 10:38 AM    Glucose (POC) 128 10/16/2015 11:27 AM     No results for input(s): PH, PCO2, PO2, HCO3, FIO2 in the last 72 hours. Recent Labs      04/16/17   1820   INR  9.1*       Additional testing:  Abdominal CT: Long segment area of small bowel thickening in the mid small bowel with  adjacent mesenteric edema and small amount of free fluid. Findings are nonspecific but likely infectious or inflammatory. Ischemia is not excluded.  No drainable fluid collection       Assessment/Plan:       Principal Problem:    Enteritis (4/16/2017)   - SB enteritis, infectious vs inflammatory vs ischemic   - GI rest, IV hydration   - continue antibiotics for now, Zosyn   - GI and General Surgery to see    Active Problems:    HTN (hypertension) (10/8/2015)   - continue metoprolol, hold diuretics      DM (diabetes mellitus) ([address] (10/8/2015)   - type 2 with renal disease   - on diet only   - follow on low dose SSI      High cholesterol (10/8/2015)   - continue statin      ARF (acute renal failure) ([address] (4/16/2017) on CKD (chronic kidney disease) stage 3, GFR 30-59 ml/min (4/16/2017)   - creatinine up due to dehydration    - follow on IV fluids      SIRS (systemic inflammatory response syndrome) ([address] (4/16/2017)   - POA, due to enteritis   - follow on treatment as above   - patient is NOT septic at this time      Elevated lactic acid level (4/16/2017)   - follow with hydration      STEPHANI (obstructive sleep apnea) (4/16/2017)   - refuses CPAP, counseled on consequences      A-fib ([address] (4/16/2017)   - poorly rate controlled currently due to acute illness   - follow on metoprolol   - may require IV diltiazem drip if remains tachycardic      Chronic anticoagulation (4/16/2017)/Coagulopathy ([address] (4/16/2017)   - INR highly elevated   - unclear if she will need surgery   - currently not having bleeding but certainly at risk   - hold warfarin   - will give low dose vitamin K, 2.5 mg, and follow INR      Pyuria    - on initial UA, any UTI symptoms are explained by enteritis   - await urine culture, covered with Zosyn for now       Code status:   - patient is FULL CODE      Total time spent with patient: [address] discussed with: Patient, Family, Nursing Staff and SHOSHANA Montaño    Discussed:  Code Status, Care Plan and D/C Planning    Prophylaxis:  SCD's    Probable Disposition:  SNF/LTC           ___________________________________________________    Attending Physician: Bernard Diehl MD

## 2017-04-17 NOTE — ED NOTES
Bedside and Verbal shift change report given to Linda Vizcarra (oncoming nurse) by Pb Altman (offgoing nurse). Report included the following information SBAR.

## 2017-04-17 NOTE — ED NOTES
TRANSFER - OUT REPORT:    Verbal report given to edmund rn(name) on Stephanie Bell  being transferred to [facility] 1(unit) for routine progression of care       Report consisted of patients Situation, Background, Assessment and   Recommendations(SBAR). Information from the following report(s) SBAR, ED Summary, Procedure Summary, MAR and Recent Results was reviewed with the receiving nurse. Lines:   Peripheral IV 04/16/17 Left Forearm (Active)   Site Assessment Clean, dry, & intact 4/17/2017  8:00 AM   Phlebitis Assessment 0 4/17/2017  8:00 AM   Infiltration Assessment 0 4/17/2017  8:00 AM   Dressing Status Clean, dry, & intact 4/17/2017  8:00 AM   Dressing Type Transparent 4/17/2017  8:00 AM   Hub Color/Line Status Pink 4/17/2017  8:00 AM        Opportunity for questions and clarification was provided.       Patient transported with:   Registered Nurse

## 2017-04-17 NOTE — PROGRESS NOTES
4/17/2017   CARE MANAGEMENT NOTE:  CM is following in the ER for initial discharge planning. EMR reviewed. CM met with pt who was her own historian for this needs assessment. Reportedly, pt resides with her dtr and son in law in a one story home with a ramp. Both dtr and son and law are retired so pt is rarely left alone. Her 21 y.o great granddtr is also available to assist as needed. PTA, pt was ambulatory with a rollator, and is indepn with dressing and sponge baths. Pt does not drive. She has RX drug coverage and believes that she uses [employer]. Pt does not have home healthcare currently. DME - has a rollator. PCP is Dr. Jackelin Silva. Plan is to return home when medically stable. She does not anticipate any homecare needs at this writing.   Joshua

## 2017-04-17 NOTE — ED NOTES
Bedside shift change report given to [address] [place] [address] Rd (oncoming nurse) by Jania Swartz RN (offgoing nurse). Report given with SBAR, MAR, Recent Results, Vital Signs, and plan of care. Pt is alert and oriented x 4 . Call bell within reach of patient. Safety/fall precautions in place.

## 2017-04-17 NOTE — CONSULTS
General: Surgery Consult    Subjective:      Dayan Hurst is a 80 y.o. female who presented to the ED last night with c/o abdominal pain which started yesterday and was accompanied by N/V. She has not had a BM for 2 days and has not been passing flatus. No melena or hematochezia. ED work up notable for leukocytosis, elevated lactate, MAKAYLA, and supratherapeutic of 9.1 without evidence of active bleeding. CT showed long segmental small bowel thickening and mesenteric inflammatory changes consistent with enteritis. Leukocytosis and lactate improved this morning on recheck. INR remains elevated at 10. Pt states abdominal pain improved.      Past Medical History:   Diagnosis Date    Arrhythmia     Irregular HR-\"beats fast\"    CKD (chronic kidney disease) stage 3, GFR 30-59 ml/min 4/16/2017    [facility]     GERD (gastroesophageal reflux disease)     High cholesterol     Hx of blood clots     Hypertension     PUD (peptic ulcer disease)     Sleep apnea     Will not use mask    Stroke ([address]     TIA     Past Surgical History:   Procedure Laterality Date    HX APPENDECTOMY      HX BACK SURGERY      lower back    HX HEMORRHOIDECTOMY      HX HYSTERECTOMY      HX ORTHOPAEDIC      knee surgery    HX ORTHOPAEDIC      carpal tunnel surgery    HX OTHER SURGICAL      HX OTHER SURGICAL  10/13/15    TTE: EF 55-60%, LA dilated      Family History   Problem Relation Age of Onset    Parkinsonism Mother     Cancer Father      lung     Social History     Social History    Marital status:      Spouse name: N/A    Number of children: N/A    Years of education: N/A     Social History Main Topics    Smoking status: Never Smoker    Smokeless tobacco: Never Used    Alcohol use No    Drug use: No    Sexual activity: Not Asked     Other Topics Concern    None     Social History Narrative      Current Facility-Administered Medications   Medication Dose Route Frequency    0.9% sodium chloride infusion  100 mL/hr IntraVENous CONTINUOUS    sodium chloride (NS) flush 5-10 mL  5-10 mL IntraVENous Q8H    sodium chloride (NS) flush 5-10 mL  5-10 mL IntraVENous PRN    acetaminophen (TYLENOL) tablet 650 mg  650 mg Oral Q4H PRN    oxyCODONE-acetaminophen (PERCOCET) 5-325 mg per tablet 1 Tab  1 Tab Oral Q4H PRN    HYDROmorphone (PF) (DILAUDID) injection 0.5 mg  0.5 mg IntraVENous Q4H PRN    insulin lispro (HUMALOG) injection   SubCUTAneous AC&HS    glucose chewable tablet 16 g  4 Tab Oral PRN    dextrose (D50W) injection syrg 12.5-25 g  12.5-25 g IntraVENous PRN    glucagon (GLUCAGEN) injection 1 mg  1 mg IntraMUSCular PRN    febuxostat (ULORIC) tablet 40 mg  40 mg Oral DAILY    metoprolol succinate (TOPROL-XL) XL tablet 12.5 mg  12.5 mg Oral DAILY    pravastatin (PRAVACHOL) tablet 40 mg  40 mg Oral QHS    piperacillin-tazobactam (ZOSYN) 3.375 g in 0.9% sodium chloride (MBP/ADV) 100 mL  3.375 g IntraVENous Q12H    pantoprazole (PROTONIX) tablet 40 mg  40 mg Oral DAILY     Current Outpatient Prescriptions   Medication Sig    acetaminophen (TYLENOL) 325 mg tablet Take  by mouth every four (4) hours as needed for Pain.  warfarin (COUMADIN) 2.5 mg tablet Take 2.5 mg by mouth every Tuesday, Thursday, Saturday & Sunday.  triamcinolone acetonide (KENALOG) 0.1 % topical cream Apply  to affected area two (2) times daily as needed for Skin Irritation. use thin layer    calcium carbonate-vitamin D3 600 mg (1,500 mg)-800 unit chew Take 1 Tab by mouth daily.  nystatin (MYCOSTATIN) powder Apply  to affected area two (2) times daily as needed.  febuxostat (ULORIC) 40 mg tab tablet Take 40 mg by mouth daily.  warfarin (COUMADIN) 5 mg tablet Take 5 mg by mouth every Monday, Wednesday, Friday.  furosemide (LASIX) 40 mg tablet Take 40 mg by mouth two (2) times a day.  metoprolol succinate (TOPROL XL) 25 mg XL tablet Take 12.5 mg by mouth daily.     ferrous gluconate (FERGON) 240 mg (27 mg iron) tablet Take 240 mg by mouth three (3) times daily (with meals).  ascorbic acid (VITAMIN C) 500 mg tablet Take 500 mg by mouth three (3) times daily.  b complex vitamins tablet Take 1 Tab by mouth daily.  cholecalciferol, vitamin D3, (VITAMIN D3) 2,000 unit tab Take 1 Tab by mouth daily.  folic acid 123 mcg tablet Take 400 mcg by mouth daily.  omeprazole (PRILOSEC) 40 mg capsule Take 40 mg by mouth daily.  fish oil-dha-epa 1,200-144-216 mg cap Take  by mouth daily.  traMADol (ULTRAM) 50 mg tablet Take 50 mg by mouth every six (6) hours as needed for Pain.  trolamine salicylate (MYOFLEX) 10 % topical cream Apply  to affected area as needed. Apply to knees    pravastatin (PRAVACHOL) 40 mg tablet Take 40 mg by mouth nightly.       Allergies   Allergen Reactions    Accupril [Quinapril] Unable to Obtain    Allopurinol Unknown (comments)    Demerol [Meperidine] Hives    Morphine Hives    Sulfamethoxazole-Trimethoprim Myalgia       Review of Systems:REVIEW OF SYSTEMS:     []     Unable to obtain  ROS due to  []    mental status change  []    sedated   []    intubated   []    Total of 12 systems reviewed as follows:    Constitutional: neg for fevers, chills  Eyes: negative for blurry vision  Ears, nose, mouth, throat, and face: negative for sore throat  Respiratory: negative for SOB  Cardiovascular: negative for CP  Gastrointestinal: positive for nausea, vomiting and abdominal pain  Genitourinary: negative for dysuria  Integument/breast: neg for skin rash  Hematologic/lymphatic: neg for bruising  Musculoskeletal: negative for muscle aches  Neurological: no dizziness or h/a    Objective:      Patient Vitals for the past 8 hrs:   BP Temp Pulse Resp SpO2   17 0813 118/59 - 89 - -   17 0800 118/59 97.8 °F (36.6 °C) 100 20 94 %   17 0339 96/63 - (!) 104 19 96 %   17 0200 92/48 - 89 22 93 %       Temp (24hrs), Av °F (36.7 °C), Min:97.8 °F (36.6 °C), Max:98.2 °F (36.8 °C)      Physical Exam:  General:  Alert, cooperative, no distress, appears stated age. Eyes:  Conjunctivae/corneas clear. Nose: Nares normal. Septum midline. Mouth/Throat: Lips, mucosa, and tongue normal.    Neck: Supple, symmetrical, trachea midline   Lungs:   Clear to auscultation bilaterally. Heart:  Regular rate and rhythm   Abdomen:   Soft, non-tender, non-distended, no guarding or rebound   Extremities: Extremities normal, atraumatic, no cyanosis or edema. Skin: Skin color, texture, turgor normal. No rashes or lesions   Neuro: Alert, oriented, speech clear      Labs: Recent Labs      04/17/17 0334   WBC  11.6*   HGB  9.4*   HCT  28.2*   PLT  214     Recent Labs      04/17/17 0334 04/16/17   1724   NA  141  139   K  3.9  4.0   CL  106  101   CO2  27  27   GLU  144*  208*   BUN  46*  46*   CREA  2.34*  2.25*   CA  8.1*  9.2   MG  1.6   --    PHOS  4.5   --    ALB   --   3.2*   TBILI   --   0.7   SGOT   --   20   ALT   --   20     Recent Labs      04/17/17 0334   INR  10.0*     CT abd/pelvis images and report reviewed     Assessment and Plan:     Acute enteritis    Low suspicion for bowel ischemia, abdominal exam benign today, leukocytosis trending down, lactate cleared. Continue ABX, IVF, bowel rest. Would reverse INR w/ Vit K but will defer to hospitalist. No plan for surgery at this point. Will continue to follow. Thank you for the consultation. Assessment and plan discussed with Dr. Mynor Ruiz. Signed By: SHOSHANA White     April 17, 2017       Patient was evaluated in the ER. At the time of exam patient states that she does not have any abd pain. She last had a BM two days ago. She is not passing any flatus, she claims,  Her abd is soft with no guarding or rebound. She does not appear to have any evidence of ischemic bowel or signs of peritonitis - I would recommend correction of her supra therapeutic INR and watch her closely.

## 2017-04-17 NOTE — PROGRESS NOTES
GI note  Consult received chart reviewed. Enteritis on CT, leukocytosis, mild anemia. Agree with supportive care, antibiotics  No indication for endoscopic intervention at current.   Full consult to follow  Iliana Escobar MD

## 2017-04-17 NOTE — PROGRESS NOTES
Stevan Fang [facility] [address], [place], [address] [facility]  [phone]      Medical Progress Note      NAME:         Howard Soto   :        1924  MRM:        [id number]    Date:          2017      Subjective: Patient has been seen and examined as a follow up for acute enteritis. Chart, labs, diagnostics reviewed. She continues to have a vague, moderate, aching abdominal discomfort with nausea and vomiting. No diarrhea. No fever or chills. No external bleeding    Objective:    Vital Signs:    Visit Vitals    /60    Pulse (!) 101    Temp 97.8 °F (36.6 °C)    Resp 22    Ht 5' 3\" (1.6 m)    Wt 85.2 kg (187 lb 13.3 oz)    SpO2 95%    Breastfeeding No    BMI 33.27 kg/m2      No intake or output data in the 24 hours ending 17 1313     Physical Examination:    General:   Weak looking elderly female patient, not in any acute distress   Eyes:   pink conjunctivae, PERRLA with no discharge. ENT:   no ottorrhea or rhinorrhea with dry mucous membranes  Neck: no masses, thyroid non-tender and trachea central.  Pulm:  no accessory muscle use, decreased but clear breath sounds without crackles or wheezes  Card:  no JVD or murmurs, has regular and normal S1, S2 without thrills, bruits or peripheral edema  Abd:  Soft, minimal discomfort, non-distended, normoactive bowel sounds with no palpable organomegaly  Musc:  No cyanosis, clubbing, atrophy or deformities. Skin:  No rashes. Upper extremity bruising. No ulcers. Neuro: Awake and alert.  Generally a non focal exam. Follows commands appropriately  Psych:  Has a good insight and is oriented x 3    Current Facility-Administered Medications   Medication Dose Route Frequency    0.9% sodium chloride infusion  100 mL/hr IntraVENous CONTINUOUS    sodium chloride (NS) flush 5-10 mL  5-10 mL IntraVENous Q8H    sodium chloride (NS) flush 5-10 mL  5-10 mL IntraVENous PRN    acetaminophen (TYLENOL) tablet 650 mg  650 mg Oral Q4H PRN    oxyCODONE-acetaminophen (PERCOCET) 5-325 mg per tablet 1 Tab  1 Tab Oral Q4H PRN    HYDROmorphone (PF) (DILAUDID) injection 0.5 mg  0.5 mg IntraVENous Q4H PRN    insulin lispro (HUMALOG) injection   SubCUTAneous AC&HS    glucose chewable tablet 16 g  4 Tab Oral PRN    dextrose (D50W) injection syrg 12.5-25 g  12.5-25 g IntraVENous PRN    glucagon (GLUCAGEN) injection 1 mg  1 mg IntraMUSCular PRN    febuxostat (ULORIC) tablet 40 mg  40 mg Oral DAILY    metoprolol succinate (TOPROL-XL) XL tablet 12.5 mg  12.5 mg Oral DAILY    pravastatin (PRAVACHOL) tablet 40 mg  40 mg Oral QHS    piperacillin-tazobactam (ZOSYN) 3.375 g in 0.9% sodium chloride (MBP/ADV) 100 mL  3.375 g IntraVENous Q12H    pantoprazole (PROTONIX) tablet 40 mg  40 mg Oral DAILY     Current Outpatient Prescriptions   Medication Sig    acetaminophen (TYLENOL) 325 mg tablet Take  by mouth every four (4) hours as needed for Pain.  warfarin (COUMADIN) 2.5 mg tablet Take 2.5 mg by mouth every Tuesday, Thursday, Saturday & Sunday.  triamcinolone acetonide (KENALOG) 0.1 % topical cream Apply  to affected area two (2) times daily as needed for Skin Irritation. use thin layer    calcium carbonate-vitamin D3 600 mg (1,500 mg)-800 unit chew Take 1 Tab by mouth daily.  nystatin (MYCOSTATIN) powder Apply  to affected area two (2) times daily as needed.  febuxostat (ULORIC) 40 mg tab tablet Take 40 mg by mouth daily.  warfarin (COUMADIN) 5 mg tablet Take 5 mg by mouth every Monday, Wednesday, Friday.  furosemide (LASIX) 40 mg tablet Take 40 mg by mouth two (2) times a day.  metoprolol succinate (TOPROL XL) 25 mg XL tablet Take 12.5 mg by mouth daily.  ferrous gluconate (FERGON) 240 mg (27 mg iron) tablet Take 240 mg by mouth three (3) times daily (with meals).  ascorbic acid (VITAMIN C) 500 mg tablet Take 500 mg by mouth three (3) times daily.     b complex vitamins tablet Take 1 Tab by mouth daily.  cholecalciferol, vitamin D3, (VITAMIN D3) 2,000 unit tab Take 1 Tab by mouth daily.  folic acid 681 mcg tablet Take 400 mcg by mouth daily.  omeprazole (PRILOSEC) 40 mg capsule Take 40 mg by mouth daily.  fish oil-dha-epa 1,200-144-216 mg cap Take  by mouth daily.  traMADol (ULTRAM) 50 mg tablet Take 50 mg by mouth every six (6) hours as needed for Pain.  trolamine salicylate (MYOFLEX) 10 % topical cream Apply  to affected area as needed. Apply to knees    pravastatin (PRAVACHOL) 40 mg tablet Take 40 mg by mouth nightly. Laboratory data and review:    Recent Labs      04/17/17   0334 04/16/17   1724   WBC  11.6*  14.8*   HGB  9.4*  12.0   HCT  28.2*  35.5   PLT  214  263     Recent Labs      04/17/17   0334  04/16/17   1820  04/16/17   1724   NA  141   --   139   K  3.9   --   4.0   CL  106   --   101   CO2  27   --   27   GLU  144*   --   208*   BUN  46*   --   46*   CREA  2.34*   --   2.25*   CA  8.1*   --   9.2   MG  1.6   --    --    PHOS  4.5   --    --    ALB   --    --   3.2*   SGOT   --    --   20   ALT   --    --   20   INR  10.0*  9.1*   --      No components found for: [place]    Other Diagnostics:    CT scan abdomen and pelvis - Long segment area of small bowel thickening in the mid small bowel with  adjacent mesenteric edema and small amount of free fluid. Findings are nonspecific but likely infectious or inflammatory. Ischemia is not excluded. No drainable fluid collection    Telemetry reviewed:   normal sinus rhythm    Assessment and Plan:    Enteritis (4/16/2017)/ SIRS (systemic inflammatory response syndrome) (HCC) (4/16/2017)/ Elevated lactic acid level (4/16/2017) POA: acute involving small bowel. Unclear if infectious, inflammatory vs ischemic. No diarrhea. Seen by general surgery and GI eval is pending. No planned procedure.  Continue IV antibiotics and follow clinically    Chronic anticoagulation (4/16/2017)/Coagulopathy ([facility]) (4/16/2017): due to Warfarin. INR worse today at 10 after a dose of vitamin K. No overt bleeding. Monitor Hgb. Give another dose of vitamin K. Transfuse FFPs if bleeding occurs    HTN (hypertension) (10/8/2015): BP low normal. Continue Metoprolol and monitor BP    DM (diabetes mellitus) ([facility] [address] (10/8/2015):- type 2 with renal disease. Now NPO. Continue to monitor blood glucose. SSI per protocol    High cholesterol (10/8/2015): continue Simvastatin    ARF (acute renal failure) (HCC) (4/16/2017)/ CKD (chronic kidney disease) stage 3, GFR 30-59 ml/min (4/16/2017): Cr worsening. Hydrate. Monitor renal function    STEPHANI (obstructive sleep apnea) (4/16/2017): has declined to use CPAP    A-fib ([facility] [address] (4/16/2017): chronic. Rate controlled. Continue metoprolol. Coumadin on hold due to coagulopathy    Pyuria (4/17/2017): follow urine cultures.  On empiric IV antibiotics    Code status: patient is FULL CODE    Total time spent for the patient's care: [address] discussed with: Patient, Family and Nursing Staff    Discussed:  Care Plan and D/C Planning    Prophylaxis:  H2B/PPI    Anticipated Disposition:  Home w/Family vsTBD           ___________________________________________________    Attending Physician:   Kraig Johnston MD

## 2017-04-17 NOTE — ED NOTES
Bedside shift change report given to Sheyla Hernandes (oncoming nurse) by Manuela Mcgrath (offgoing nurse). Report included the following information SBAR.

## 2017-04-17 NOTE — PROGRESS NOTES
Problem: Mobility Impaired (Adult and Pediatric)  Goal: *Acute Goals and Plan of Care (Insert Text)  Physical Therapy Goals  Initiated 4/17/2017  1. Patient will move from supine to sit and sit to supine , scoot up and down and roll side to side in bed with independence within 7 day(s). 2. Patient will transfer from bed to chair and chair to bed with modified independence using the least restrictive device within 7 day(s). 3. Patient will perform sit to stand with modified independence within 7 day(s). 4. Patient will ambulate with modified independence for 200 feet with the least restrictive device within 7 day(s). PHYSICAL THERAPY EVALUATION  Patient: Leighann Robles (89 y.o. female)  Date: 4/17/2017  Primary Diagnosis: Enteritis        Precautions: fall         ASSESSMENT :  Based on the objective data described below, the pleasant patient presents with mild abdominal discomfort 3/10 and no nausea or report of vomiting since yesterday midday. Pt has complex medical history including HTN, DM, A fib, ARF, SIRS and STEPHANI and was admitted for enteritis. Pt states she feels tired and wishes she was at home. Tomorrow is her birthday. Pt reports L shoulder stiffness and discomfort starting today. Pt has generally decreased but functional strength and range of motion, decreased activity tolerance and needed moderate assist and rolling walker for transfers. Pt ambulated about 20 feet with rolling walker and contact guard assist. Pt reported severe fatigue and was returned to supine. Pt did not desaturate on room air with activity. Pt lives with her daughter, usually ambulates short distances with her rollator, is independent in ADL's and has not fallen in the past 12 months. Patient will benefit from skilled intervention to address the above impairments.   Patients rehabilitation potential is considered to be Good  Factors which may influence rehabilitation potential include:   [ ]         None noted  [ ] Mental ability/status  [X]         Medical condition  [ ]         Home/family situation and support systems  [ ]         Safety awareness  [X]         Pain tolerance/management  [ ]         Other:        PLAN :  Recommendations and Planned Interventions:  [X]           Bed Mobility Training             [X]    Neuromuscular Re-Education  [X]           Transfer Training                   [ ]    Orthotic/Prosthetic Training  [X]           Gait Training                         [ ]    Modalities  [X]           Therapeutic Exercises           [ ]    Edema Management/Control  [X]           Therapeutic Activities            [X]    Patient and Family Training/Education  [ ]           Other (comment):     Frequency/Duration: Patient will be followed by physical therapy  5 times a week to address goals. Discharge Recommendations: Home Health and None  Further Equipment Recommendations for Discharge: none       SUBJECTIVE:   Patient stated I feel all right.       OBJECTIVE DATA SUMMARY:   HISTORY:    Past Medical History:   Diagnosis Date    Arrhythmia       Irregular HR-\"beats fast\"    CKD (chronic kidney disease) stage 3, GFR 30-59 ml/min 4/16/2017    Diabetes ([facility] [address]      GERD (gastroesophageal reflux disease)      High cholesterol      Hx of blood clots      Hypertension      PUD (peptic ulcer disease)      Sleep apnea       Will not use mask    Stroke ([facility] [address]       TIA     Past Surgical History:   Procedure Laterality Date    HX APPENDECTOMY        HX BACK SURGERY         lower back    HX HEMORRHOIDECTOMY        HX HYSTERECTOMY        HX ORTHOPAEDIC         knee surgery    HX ORTHOPAEDIC         carpal tunnel surgery    HX OTHER SURGICAL        HX OTHER SURGICAL   10/13/15     TTE: EF 55-60%, LA dilated     Prior Level of Function/Home Situation: Pt was amodified independent household distance ambulator, was independent in ADL's, and has not fallen in the past year.   Personal factors and/or comorbidities impacting plan of care: HTN, DM, A-fib, ARF, STEPHANI, SIRS     Home Situation  Home Environment: Private residence (lives with daughter & son in law)  Wheelchair Ramp: Yes  One/Two Story Residence: One story  Living Alone: No  Support Systems: Child(alondra)  Patient Expects to be Discharged to[de-identified] Private residence  Current DME Used/Available at Home: [place] Severe, rollator, Wheelchair  Tub or Shower Type:  (takes sponge baths)     EXAMINATION/PRESENTATION/DECISION MAKING:   Critical Behavior:  Neurologic State: Alert  Orientation Level: Oriented X4  Cognition: Appropriate decision making, Appropriate for age attention/concentration, Appropriate safety awareness, Follows commands     Hearing: Auditory  Auditory Impairment: None  Skin:  intact  Edema: not noted  Range Of Motion:  AROM: Generally decreased, functional           PROM: Generally decreased, functional           Strength:    Strength: Generally decreased, functional                    Tone & Sensation:   Tone: Normal              Sensation: Intact (tingling/numbness R hand at times)               Coordination:  Coordination: Generally decreased, functional  Vision:      Functional Mobility:  Bed Mobility:     Supine to Sit: Additional time;Assist x1; Moderate assistance  Sit to Supine: Assist x1;Additional time; Moderate assistance  Scooting: Assist x2;Maximum assistance  Transfers:  Sit to Stand: Additional time;Assist x1;Minimum assistance  Stand to Sit: Assist x1;Contact guard assistance; Additional time                       Balance:   Sitting: Intact  Standing: Intact; With support  Ambulation/Gait Training:  Distance (ft): 20 Feet (ft)  Assistive Device: Gait belt;Walker, rolling  Ambulation - Level of Assistance: Contact guard assistance     Gait Description (WDL): Exceptions to WDL                 Speed/Sania: Pace decreased (<100 feet/min)                                              Stairs:                           Therapeutic Exercises:   Instructed in ankle pumps and heel glides and encouraged to exercise hourly     Functional Measure:  Barthel Index:      Bathin  Bladder: 10  Bowels: 10  Groomin  Dressin  Feeding: 10  Mobility: 0  Stairs: 0  Toilet Use: 5  Transfer (Bed to Chair and Back): 10  Total: 55         Barthel and G-code impairment scale:  Percentage of impairment CH  0% CI  1-19% CJ  20-39% CK  40-59% CL  60-79% CM  80-99% CN  100%   Barthel Score 0-100 100 99-80 79-60 59-40 20-39 1-19    0   Barthel Score 0-20 20 17-19 13-16 9-12 5-8 1-4 0      The Barthel ADL Index: Guidelines  1. The index should be used as a record of what a patient does, not as a record of what a patient could do. 2. The main aim is to establish degree of independence from any help, physical or verbal, however minor and for whatever reason. 3. The need for supervision renders the patient not independent. 4. A patient's performance should be established using the best available evidence. Asking the patient, friends/relatives and nurses are the usual sources, but direct observation and common sense are also important. However direct testing is not needed. 5. Usually the patient's performance over the preceding 24-48 hours is important, but occasionally longer periods will be relevant. 6. Middle categories imply that the patient supplies over 50 per cent of the effort. 7. Use of aids to be independent is allowed. Karli Damon., Barthel, D.W. (7018). Functional evaluation: the Barthel Index. [facility] (14)2. Verlon Collie ricardo Emory, J.J.M.F, Ashley Dallas., [facility]., [place], [address] [place] [address] (). Measuring the change indisability after inpatient rehabilitation; comparison of the responsiveness of the Barthel Index and Functional [place] Measure. Journal of Neurology, Neurosurgery, and Psychiatry, 66(4), 076-342. Kurtis Rothman, N.J.A, LING Dominguez, & Alia Shabazz M.A. (2004.) Assessment of post-stroke quality of life in cost-effectiveness studies:  The usefulness of the Barthel Index and the EuroQoL-5D. Quality of Life Research, 13, 446-00         G codes: In compliance with CMSs Claims Based Outcome Reporting, the following G-code set was chosen for this patient based on their primary functional limitation being treated: The outcome measure chosen to determine the severity of the functional limitation was the Barthel Index with a score of 55/100 which was correlated with the impairment scale. · Mobility - Walking and Moving Around:               - CURRENT STATUS:    CK - 40%-59% impaired, limited or restricted               - GOAL STATUS:           CJ - 20%-39% impaired, limited or restricted               - D/C STATUS:                       ---------------To be determined---------------      Physical Therapy Evaluation Charge Determination   History Examination Presentation Decision-Making   HIGH Complexity :3+ comorbidities / personal factors will impact the outcome/ POC  LOW Complexity : 1-2 Standardized tests and measures addressing body structure, function, activity limitation and / or participation in recreation  LOW Complexity : Stable, uncomplicated  Other outcome measures Barthel Index MEDIUM      Based on the above components, the patient evaluation is determined to be of the following complexity level: LOW      Pain:  Pain Scale 1: Numeric (0 - 10)  Pain Intensity 1: 3              Activity Tolerance:   fair  Please refer to the flowsheet for vital signs taken during this treatment.   After treatment:   [ ]         Patient left in no apparent distress sitting up in chair  [X]         Patient left in no apparent distress in bed  [X]         Call bell left within reach  [X]         Nursing notified  [X]         Caregiver present  [ ]         Bed alarm activated      COMMUNICATION/EDUCATION:   The patients plan of care was discussed with: Registered Nurse.  [X]         Fall prevention education was provided and the patient/caregiver indicated understanding. [X]         Patient/family have participated as able in goal setting and plan of care. [X]         Patient/family agree to work toward stated goals and plan of care. [ ]         Patient understands intent and goals of therapy, but is neutral about his/her participation. [ ]         Patient is unable to participate in goal setting and plan of care.      Thank you for this referral.  Filipe Franco, PT   Time Calculation: 18 mins

## 2017-04-17 NOTE — PROGRESS NOTES
TRANSFER - IN REPORT:    Verbal report received from Martine (name) on Dwayne Chin  being received from ED (unit) for routine progression of care      Report consisted of patients Situation, Background, Assessment and   Recommendations(SBAR). Information from the following report(s) SBAR, Kardex, ED Summary, Procedure Summary, Intake/Output, MAR, Recent Results and Cardiac Rhythm nsr was reviewed with the receiving nurse. Opportunity for questions and clarification was provided. Martine RN to check pm BG before bringing pt upstairs. Assessment completed upon patients arrival to unit and care assumed.

## 2017-04-18 LAB
ANION GAP BLD CALC-SCNC: 12 MMOL/L (ref 5–15)
BASOPHILS # BLD AUTO: 0 K/UL (ref 0–0.1)
BASOPHILS # BLD: 0 % (ref 0–1)
BUN SERPL-MCNC: 42 MG/DL (ref 6–20)
BUN/CREAT SERPL: 22 (ref 12–20)
CALCIUM SERPL-MCNC: 7.8 MG/DL (ref 8.5–10.1)
CHLORIDE SERPL-SCNC: 110 MMOL/L (ref 97–108)
CO2 SERPL-SCNC: 22 MMOL/L (ref 21–32)
CREAT SERPL-MCNC: 1.9 MG/DL (ref 0.55–1.02)
EOSINOPHIL # BLD: 0.1 K/UL (ref 0–0.4)
EOSINOPHIL NFR BLD: 0 % (ref 0–7)
ERYTHROCYTE [DISTWIDTH] IN BLOOD BY AUTOMATED COUNT: 15.4 % (ref 11.5–14.5)
GLUCOSE BLD STRIP.AUTO-MCNC: 105 MG/DL (ref 65–100)
GLUCOSE BLD STRIP.AUTO-MCNC: 106 MG/DL (ref 65–100)
GLUCOSE BLD STRIP.AUTO-MCNC: 109 MG/DL (ref 65–100)
GLUCOSE BLD STRIP.AUTO-MCNC: 92 MG/DL (ref 65–100)
GLUCOSE SERPL-MCNC: 112 MG/DL (ref 65–100)
HCT VFR BLD AUTO: 27.9 % (ref 35–47)
HGB BLD-MCNC: 9 G/DL (ref 11.5–16)
INR PPP: 3.8 (ref 0.9–1.1)
LYMPHOCYTES # BLD AUTO: 18 % (ref 12–49)
LYMPHOCYTES # BLD: 2.1 K/UL (ref 0.8–3.5)
MCH RBC QN AUTO: 29.9 PG (ref 26–34)
MCHC RBC AUTO-ENTMCNC: 32.3 G/DL (ref 30–36.5)
MCV RBC AUTO: 92.7 FL (ref 80–99)
MONOCYTES # BLD: 0.9 K/UL (ref 0–1)
MONOCYTES NFR BLD AUTO: 8 % (ref 5–13)
NEUTS SEG # BLD: 8.5 K/UL (ref 1.8–8)
NEUTS SEG NFR BLD AUTO: 74 % (ref 32–75)
PLATELET # BLD AUTO: 225 K/UL (ref 150–400)
POTASSIUM SERPL-SCNC: 4 MMOL/L (ref 3.5–5.1)
PROTHROMBIN TIME: 39.9 SEC (ref 9–11.1)
RBC # BLD AUTO: 3.01 M/UL (ref 3.8–5.2)
SERVICE CMNT-IMP: ABNORMAL
SERVICE CMNT-IMP: NORMAL
SODIUM SERPL-SCNC: 144 MMOL/L (ref 136–145)
WBC # BLD AUTO: 11.6 K/UL (ref 3.6–11)

## 2017-04-18 PROCEDURE — 74011000258 HC RX REV CODE- 258: Performed by: INTERNAL MEDICINE

## 2017-04-18 PROCEDURE — 85025 COMPLETE CBC W/AUTO DIFF WBC: CPT | Performed by: INTERNAL MEDICINE

## 2017-04-18 PROCEDURE — 65660000000 HC RM CCU STEPDOWN

## 2017-04-18 PROCEDURE — 36415 COLL VENOUS BLD VENIPUNCTURE: CPT | Performed by: INTERNAL MEDICINE

## 2017-04-18 PROCEDURE — 97116 GAIT TRAINING THERAPY: CPT

## 2017-04-18 PROCEDURE — 97165 OT EVAL LOW COMPLEX 30 MIN: CPT

## 2017-04-18 PROCEDURE — 74011250637 HC RX REV CODE- 250/637: Performed by: INTERNAL MEDICINE

## 2017-04-18 PROCEDURE — 97535 SELF CARE MNGMENT TRAINING: CPT

## 2017-04-18 PROCEDURE — 74011250636 HC RX REV CODE- 250/636: Performed by: INTERNAL MEDICINE

## 2017-04-18 PROCEDURE — 82962 GLUCOSE BLOOD TEST: CPT

## 2017-04-18 PROCEDURE — 85610 PROTHROMBIN TIME: CPT | Performed by: PHYSICIAN ASSISTANT

## 2017-04-18 PROCEDURE — 80048 BASIC METABOLIC PNL TOTAL CA: CPT | Performed by: INTERNAL MEDICINE

## 2017-04-18 RX ADMIN — SODIUM CHLORIDE 100 ML/HR: 900 INJECTION, SOLUTION INTRAVENOUS at 04:20

## 2017-04-18 RX ADMIN — PRAVASTATIN SODIUM 40 MG: 20 TABLET ORAL at 23:22

## 2017-04-18 RX ADMIN — PANTOPRAZOLE SODIUM 40 MG: 40 TABLET, DELAYED RELEASE ORAL at 11:57

## 2017-04-18 RX ADMIN — PIPERACILLIN SODIUM,TAZOBACTAM SODIUM 3.38 G: 3; .375 INJECTION, POWDER, FOR SOLUTION INTRAVENOUS at 08:50

## 2017-04-18 RX ADMIN — SODIUM CHLORIDE 100 ML/HR: 900 INJECTION, SOLUTION INTRAVENOUS at 19:08

## 2017-04-18 RX ADMIN — PIPERACILLIN SODIUM,TAZOBACTAM SODIUM 3.38 G: 3; .375 INJECTION, POWDER, FOR SOLUTION INTRAVENOUS at 20:53

## 2017-04-18 RX ADMIN — FEBUXOSTAT 40 MG: 40 TABLET ORAL at 11:57

## 2017-04-18 RX ADMIN — METOPROLOL SUCCINATE 12.5 MG: 25 TABLET, FILM COATED, EXTENDED RELEASE ORAL at 11:57

## 2017-04-18 RX ADMIN — Medication 10 ML: at 05:17

## 2017-04-18 NOTE — PROGRESS NOTES
Problem: Mobility Impaired (Adult and Pediatric)  Goal: *Acute Goals and Plan of Care (Insert Text)  Physical Therapy Goals  Initiated 4/17/2017  1. Patient will move from supine to sit and sit to supine , scoot up and down and roll side to side in bed with independence within 7 day(s). 2. Patient will transfer from bed to chair and chair to bed with modified independence using the least restrictive device within 7 day(s). 3. Patient will perform sit to stand with modified independence within 7 day(s). 4. Patient will ambulate with modified independence for 200 feet with the least restrictive device within 7 day(s). PHYSICAL THERAPY TREATMENT  Patient: Alexi Healy (78 y.o. female)  Date: 4/18/2017  Diagnosis: Enteritis Enteritis       Precautions:        ASSESSMENT:  Patient able to increase ambulation distance to 35 feet with RW, increased fatigue today. Patient with improved hemodynamic. INR now 3.8 (down from 10.0 yesterday), but still anemic with Hgb trending down (12-9.8-8). Patient returned to sitting up in chair and LE exercises performed and encouraged to perform outside of therapy. She verbalized understanding. Progression toward goals:  [ ]    Improving appropriately and progressing toward goals  [X]    Improving slowly and progressing toward goals  [ ]    Not making progress toward goals and plan of care will be adjusted       PLAN:  Patient continues to benefit from skilled intervention to address the above impairments. Continue treatment per established plan of care. Discharge Recommendations:  Rehab  Further Equipment Recommendations for Discharge:  TBD       SUBJECTIVE:   Patient stated Angeles Salinas can get up, sure.       OBJECTIVE DATA SUMMARY:   Critical Behavior:  Neurologic State: Alert  Orientation Level: Oriented X4  Cognition: Appropriate decision making     Functional Mobility Training:  Bed Mobility:  Rolling: Minimum assistance  Supine to Sit: Minimum assistance Transfers:  Sit to Stand: Minimum assistance  Stand to Sit: Minimum assistance        Bed to Chair: Contact guard assistance                    Balance:  Sitting: Intact  Standing: Intact; With support  Ambulation/Gait Training:  Distance (ft): 35 Feet (ft)  Assistive Device: Walker, rolling;Gait belt  Ambulation - Level of Assistance: Contact guard assistance                   Stairs:                       Neuro Re-Education:     Therapeutic Exercises: Ankle pumps,LAQ, hip flexion  2 sets of 10  Pain:  Pain Scale 1: Numeric (0 - 10)  Pain Intensity 1: 0  Pain Location 1: Abdomen  Pain Orientation 1: Lower  Pain Description 1: Sore     Activity Tolerance:   Good- no complications and vitals stable  Please refer to the flowsheet for vital signs taken during this treatment.   After treatment:   [X]    Patient left in no apparent distress sitting up in chair  [ ]    Patient left in no apparent distress in bed  [X]    Call bell left within reach  [X]    Nursing notified  [ ]    Caregiver present  [X]    Chair alarm activated      COMMUNICATION/COLLABORATION:   The patients plan of care was discussed with: Registered Nurse     Sonia Roger PT, DPT   Time Calculation: 15 mins

## 2017-04-18 NOTE — PROGRESS NOTES
Bedside shift change report given to Shiraz Brock RN (oncoming nurse) by Awais Edmonds RN (offgoing nurse).  Report included the following information SBAR, Kardex, Intake/Output, MAR, Recent Results, Med Rec Status and Cardiac Rhythm SR.

## 2017-04-18 NOTE — ED NOTES
Bedside and Verbal shift change report given to RN Vinita Mcclain (oncoming nurse) by Tori Dejesus (offgoing nurse). Report included the following information SBAR, Kardex, ED Summary, MAR, Recent Results, Med Rec Status and Cardiac Rhythm NSR.

## 2017-04-18 NOTE — ED NOTES
TRANSFER - OUT REPORT:    Verbal report given to BHARGAV Pierce on Shanelle Sparks  being transferred to Remote Telemetry (unit) for routine progression of care       Report consisted of patients Situation, Background, Assessment and   Recommendations(SBAR). Information from the following report(s) SBAR, Kardex, ED Summary, MAR, Recent Results, Med Rec Status and Cardiac Rhythm NSR was reviewed with the receiving nurse. Lines:   Peripheral IV 04/16/17 Left Forearm (Active)   Site Assessment Clean, dry, & intact 4/17/2017  8:23 PM   Phlebitis Assessment 0 4/17/2017  8:23 PM   Infiltration Assessment 0 4/17/2017  8:23 PM   Dressing Status Clean, dry, & intact 4/17/2017  8:23 PM   Dressing Type Transparent;Tape 4/17/2017  8:23 PM   Hub Color/Line Status Pink 4/17/2017  8:23 PM   Alcohol Cap Used Yes 4/17/2017  8:23 PM        Opportunity for questions and clarification was provided. Patient transported with:   Monitor      BHARGAV Wilcox states she will give 9 am meds.

## 2017-04-18 NOTE — PROGRESS NOTES
Problem: Self Care Deficits Care Plan (Adult)  Goal: *Acute Goals and Plan of Care (Insert Text)  Occupational Therapy Goals  Initiated 4/18/2017  1. Patient will perform grooming in standing at sink x 5 mins with modified indep within 7 day(s). 2. Patient will perform lower body dressing with modified indep within 7 day(s). 3. Patient will perform toilet transfers with modified indep within 7 day(s). 4. Patient will perform all aspects of toileting with modified indep within 7 day(s). 5. Patient will utilize energy conservation techniques during functional activities with verbal cues within 7 day(s). OCCUPATIONAL THERAPY EVALUATION  Patient: Edwige Hicks (1 y.o. female)  Date: 4/18/2017  Primary Diagnosis: Enteritis        Precautions: Fall        ASSESSMENT :  Based on the objective data described below, the patient presents with mild impairments with functional mobility and activity tolerance secondary abd pain with nausea and vomiting with enteritis. Patient with decreased INR from 10 yesterday to 3.8. She lives at home with her daughter and son and law who are retired and home with her. She was independent in ADL tasks and transfers with rollator. She reports decreased pain, but fatigue from lack of sleep in ED. Completed toileting training at RW level with CGA and standing at sink to complete hand hygiene. Returned to bed secondary to fatigue from time in ED. Anticipate good progress with therapy secondary to decreased pain, good motivation and higher level of PLOF. Patient will benefit from skilled intervention to address the above impairments.   Patients rehabilitation potential is considered to be Good  Factors which may influence rehabilitation potential include:   [X]             None noted  [ ]             Mental ability/status  [ ]             Medical condition  [ ]             Home/family situation and support systems  [ ]             Safety awareness  [ ]             Pain tolerance/management  [ ]             Other:        PLAN :  Recommendations and Planned Interventions:  [X]               Self Care Training                  [X]        Therapeutic Activities  [X]               Functional Mobility Training    [ ]        Cognitive Retraining  [X]               Therapeutic Exercises           [X]        Endurance Activities  [X]               Balance Training                   [ ]        Neuromuscular Re-Education  [ ]               Visual/Perceptual Training     [X]   Home Safety Training  [X]               Patient Education                 [X]        Family Training/Education  [ ]               Other (comment):     Frequency/Duration: Patient will be followed by occupational therapy 3 times a week to address goals. Discharge Recommendations: Home Health vs none depending on progress  Further Equipment Recommendations for Discharge: has all needed equipment for OT       SUBJECTIVE:   Patient stated It's my birthday today, some birthday.       OBJECTIVE DATA SUMMARY:   HISTORY:   Past Medical History:   Diagnosis Date    Arrhythmia       Irregular HR-\"beats fast\"    CKD (chronic kidney disease) stage 3, GFR 30-59 ml/min 4/16/2017    Diabetes ([facility] [address]      GERD (gastroesophageal reflux disease)      High cholesterol      Hx of blood clots      Hypertension      PUD (peptic ulcer disease)      Sleep apnea       Will not use mask    Stroke ([facility] [address]       TIA     Past Surgical History:   Procedure Laterality Date    HX APPENDECTOMY        HX BACK SURGERY         lower back    HX HEMORRHOIDECTOMY        HX HYSTERECTOMY        HX ORTHOPAEDIC         knee surgery    HX ORTHOPAEDIC         carpal tunnel surgery    HX OTHER SURGICAL        HX OTHER SURGICAL   10/13/15     TTE: EF 55-60%, LA dilated        Prior Level of Function/Home Situation: Home with daughter and son in law. Mod indep with ADL tasks with rollator.       Home Situation  Home Environment: Private residence (lives with daughter & son in law)  # Steps to Enter: 0  Wheelchair Ramp: Yes  One/Two Story Residence: One story  Living Alone: No  Support Systems: Child(alondra)  Patient Expects to be Discharged to[de-identified] Private residence  Current DME Used/Available at Home: Gwendolynn Purl, rollator, Wheelchair  Tub or Shower Type:  (takes sponge baths)  [X]  Right hand dominant             [ ]  Left hand dominant     EXAMINATION OF PERFORMANCE DEFICITS:  Cognitive/Behavioral Status:  Neurologic State: Alert  Orientation Level: Oriented X4  Cognition: Appropriate decision making; Appropriate for age attention/concentration; Appropriate safety awareness        Skin: uppers bruising, see nursing for additional detail     Edema: uppers mild swelling     Hearing: Auditory  Auditory Impairment: None     Vision/Perceptual:                           Acuity: Within Defined Limits    Corrective Lenses: Glasses     Range of Motion:  BUE: WDL           Strength:  BUE: mild impairment, functional              Coordination:     Fine Motor Skills-Upper: Left Intact; Right Intact    Gross Motor Skills-Upper: Left Intact; Right Intact     Tone & Sensation:  Tone: BUE normal  Snesation: BUE intact to touch     Balance:  Sitting: Intact  Standing: Intact; With support     Functional Mobility and Transfers for ADLs:  Bed Mobility:  Rolling: Minimum assistance  Supine to Sit: Minimum assistance     Transfers:  Sit to Stand: Contact guard assistance;Minimum assistance  Stand to Sit: Minimum assistance  Bed to Chair: Contact guard assistance  Toilet Transfer : Contact guard assistance     ADL Assessment:  Feeding: Independent     Oral Facial Hygiene/Grooming: Supervision     Bathing: Minimum assistance     Upper Body Dressing: Setup     Lower Body Dressing: Minimum assistance     Toileting: Contact guard assistance        ADL Intervention and task modifications:   Provided education on safety, sequencing and body mechanics with bed mobility, toileting, and hand hygiene standing at sink. All completed at [facility] level with CGA-min A. No NOE. Recommend up in chair with nursing for all meals. Patient reports mild fatigue secondary to poor sleep in ER. Grooming  Washing Hands: Contact guard assistance     Toileting  Toileting Assistance: Contact guard assistance (RW)  Bladder Hygiene: Contact guard assistance     Functional Measure:  Barthel Index:      Bathin  Bladder: 10  Bowels: 10  Groomin  Dressin  Feeding: 10  Mobility: 0  Stairs: 0  Toilet Use: 5  Transfer (Bed to Chair and Back): 10  Total: 55         Barthel and G-code impairment scale:  Percentage of impairment CH  0% CI  1-19% CJ  20-39% CK  40-59% CL  60-79% CM  80-99% CN  100%   Barthel Score 0-100 100 99-80 79-60 59-40 20-39 1-19    0   Barthel Score 0-20 20 17-19 13-16 9-12 5-8 1-4 0      The Barthel ADL Index: Guidelines  1. The index should be used as a record of what a patient does, not as a record of what a patient could do. 2. The main aim is to establish degree of independence from any help, physical or verbal, however minor and for whatever reason. 3. The need for supervision renders the patient not independent. 4. A patient's performance should be established using the best available evidence. Asking the patient, friends/relatives and nurses are the usual sources, but direct observation and common sense are also important. However direct testing is not needed. 5. Usually the patient's performance over the preceding 24-48 hours is important, but occasionally longer periods will be relevant. 6. Middle categories imply that the patient supplies over 50 per cent of the effort. 7. Use of aids to be independent is allowed. Eloise Carrillo., Barthel, D.W. ([id number]). Functional evaluation: the Barthel Index. [address] [facility] (14)2. SUSAN Quintero, Angelo Hardy.Bill., Yusra Zamora, [address] [place] [address] ().  Measuring the change indisability after inpatient rehabilitation; comparison of the responsiveness of the Barthel Index and Functional [place] Measure. Journal of Neurology, Neurosurgery, and Psychiatry, 66(4), 530-392. ADRIANA Rucker, LING Dominguez, & Aaliyah Paul M.A. (2004.) Assessment of post-stroke quality of life in cost-effectiveness studies: The usefulness of the Barthel Index and the EuroQoL-5D. Quality of Life Research, 13, 276-55            G codes: In compliance with CMSs Claims Based Outcome Reporting, the following G-code set was chosen for this patient based on their primary functional limitation being treated: The outcome measure chosen to determine the severity of the functional limitation was the Barthel Index with a score of 55/100 which was correlated with the impairment scale. · Self Care:               - CURRENT STATUS:    CK - 40%-59% impaired, limited or restricted               - GOAL STATUS:           CJ - 20%-39% impaired, limited or restricted               - D/C STATUS:                       ---------------To be determined---------------      Occupational Therapy Evaluation Charge Determination   History Examination Decision-Making   LOW Complexity : Brief history review  MEDIUM Complexity : 3-5 performance deficits relating to physical, cognitive , or psychosocial skils that result in activity limitations and / or participation restrictions MEDIUM Complexity : Patient may present with comorbidities that affect occupational performnce.  Miniml to moderate modification of tasks or assistance (eg, physical or verbal ) with assesment(s) is necessary to enable patient to complete evaluation       Based on the above components, the patient evaluation is determined to be of the following complexity level: LOW   Pain:  Pain Scale 1: Numeric (0 - 10)  Pain Intensity 1: 0  Pain Location 1: Abdomen  Pain Orientation 1: Lower  Pain Description 1: Sore     Activity Tolerance:   BP stable: 119/59  Please refer to the flowsheet for vital signs taken during this treatment. After treatment:   [ ] Patient left in no apparent distress sitting up in chair  [X] Patient left in no apparent distress in bed  [X] Call bell left within reach  [X] Nursing notified  [ ] Caregiver present  [X] Bed alarm activated      COMMUNICATION/EDUCATION:   The patients plan of care was discussed with: Physical Therapist, Registered Nurse and Patient. [X] Home safety education was provided and the patient/caregiver indicated understanding. [X] Patient/family have participated as able in goal setting and plan of care. [X] Patient/family agree to work toward stated goals and plan of care. [ ] Patient understands intent and goals of therapy, but is neutral about his/her participation. [ ] Patient is unable to participate in goal setting and plan of care. This patients plan of care is appropriate for delegation to [facility].      Thank you for this referral.  Kendra [place] OT  Time Calculation: 30 mins

## 2017-04-18 NOTE — ROUTINE PROCESS
Primary Nurse Esther Martínez RN and Dannielle Noel RN performed a dual skin assessment on this patient No impairment noted  Angel score is 20        Bedside and Verbal shift change report given to Velvet Rendon RN (oncoming nurse) by Shay Akins RN (offgoing nurse). Report included the following information SBAR, Procedure Summary, Intake/Output and MAR.

## 2017-04-18 NOTE — PROGRESS NOTES
General Surgery Daily Progress Note    Patient: Cristian Dickerson MRN: [id number]  SSN: [id number]    YOB: 1924  Age: 80 y.o. Sex: female      Admit Date: 4/16/2017    Subjective:   Patient states pain improved and she has minimal pain now. Current Facility-Administered Medications   Medication Dose Route Frequency    0.9% sodium chloride infusion  100 mL/hr IntraVENous CONTINUOUS    sodium chloride (NS) flush 5-10 mL  5-10 mL IntraVENous Q8H    sodium chloride (NS) flush 5-10 mL  5-10 mL IntraVENous PRN    acetaminophen (TYLENOL) tablet 650 mg  650 mg Oral Q4H PRN    oxyCODONE-acetaminophen (PERCOCET) 5-325 mg per tablet 1 Tab  1 Tab Oral Q4H PRN    HYDROmorphone (PF) (DILAUDID) injection 0.5 mg  0.5 mg IntraVENous Q4H PRN    insulin lispro (HUMALOG) injection   SubCUTAneous AC&HS    glucose chewable tablet 16 g  4 Tab Oral PRN    dextrose (D50W) injection syrg 12.5-25 g  12.5-25 g IntraVENous PRN    glucagon (GLUCAGEN) injection 1 mg  1 mg IntraMUSCular PRN    febuxostat (ULORIC) tablet 40 mg  40 mg Oral DAILY    metoprolol succinate (TOPROL-XL) XL tablet 12.5 mg  12.5 mg Oral DAILY    pravastatin (PRAVACHOL) tablet 40 mg  40 mg Oral QHS    piperacillin-tazobactam (ZOSYN) 3.375 g in 0.9% sodium chloride (MBP/ADV) 100 mL  3.375 g IntraVENous Q12H    pantoprazole (PROTONIX) tablet 40 mg  40 mg Oral DAILY        Objective:      04/16 1901 - 04/18 0700  In: 3633.3 [P.O.:240;  I.V.:3393.3]  Out: 650 [Urine:650]  Patient Vitals for the past 8 hrs:   BP Temp Pulse Resp SpO2   04/18/17 0936 100/54 98 °F (36.7 °C) 92 16 92 %   04/18/17 0845 114/55 - 100 20 95 %   04/18/17 0835 (!) 122/94 - 93 26 95 %   04/18/17 0800 117/57 - 86 21 94 %   04/18/17 0600 104/52 - 89 20 93 %   04/18/17 0400 102/58 97.8 °F (36.6 °C) 93 20 95 %       Physical Exam:  General: Alert, cooperative, NAD  Lungs: Unlabored  Heart:  Regular rate and rhythm  Abdomen: Soft, mild rebound tenderness, mildly distended  Extremities: Warm, moves all, no edema  Skin:  Warm and dry, no rash    Labs: Recent Labs      04/18/17 0421   WBC  11.6*   HGB  9.0*   HCT  27.9*   PLT  225     Recent Labs      04/18/17   0421  04/17/17   0334  04/16/17   1724   NA  144  141  139   K  4.0  3.9  4.0   CL  110*  106  101   CO2  22  27  27   GLU  112*  144*  208*   BUN  42*  46*  46*   CREA  1.90*  2.34*  2.25*   CA  7.8*  8.1*  9.2   MG   --   1.6   --    PHOS   --   4.5   --    ALB   --    --   3.2*   TBILI   --    --   0.7   SGOT   --    --   20   ALT   --    --   20       Assessment / Plan:   · Enteritis   · No BM  · Reports pain improved but she has some distention and rebound on exam. Continue ABX to watch closely. Keep NPO w/ ice.    · INR corrected with Vit K  · No indication for surgery now

## 2017-04-18 NOTE — ED NOTES
Introduced myself to patient as primary nurse. Pt resting in room, vs stable as noted, pt has no new complaints at this time. Bed in low position and locked, call bell within reach.

## 2017-04-18 NOTE — CDMP QUERY
=>Sepsis POA in the setting of   acute enteritis and Acute renal failure  AEB leucocytosis, elevated neutrophils, tachycardia, tachypnea, lactic acidosis,  low blood pressure and low MAP  treated with  2 L NS bolus, IV zosyn  =>Other Explanation of clinical findings  =>Unable to Determine (no explanation of clinical findings)    The medical record reflects the following clinical findings, treatment, and risk factors:  Risk Factors: 81 yo F admitted with acute enteritis infectious vs ischemic, acute renal failure and SIRS   Clinical Indicators: WBC 14.8 Neut's 82% Pulse 118  RR 26 /46 MAP 57  Lactic acidosis 2.2   Treatment: On arrival received 2 L NS bolus, IV zosyn and IV ns @ 100 cc/hr      Please clarify and document your clinical opinion in the progress notes and discharge summary including the definitive and/or presumptive diagnosis, (suspected or probable), related to the above clinical findings. Please include clinical findings supporting your diagnosis.     Thank you for your time   [facility] RN/BSN, [address]  Desk:   [phone]   Other:  [phone]

## 2017-04-18 NOTE — CONSULTS
Gastroenterology Consultation Note      Admit Date: 4/16/2017  Consult Date: 4/18/2017   I greatly appreciate your asking me to see Fariba Thomson, thank you very much for the opportunity to participate in her care. Narrative Assessment and Plan   · Enteritis - unclear etiology  · Anemia - suspect drop in Hgb during admission is dilutional    Plan  - continue supportive care: bowel rest, antibiotics, symptom control  - repeat labs in AM  - check iron profile  - no endoscopic intervention needed at this time  - will follow    Subjective:     Chief Complaint: Abdominal Pain    History of Present Illness: Patient is a 80 y.o. female with PMH as listed below admitted on 4/16/2017 with complaint of abdominal pain over the past 2 days. The pain began suddenly and is described as a severe, constant pain located in the midabdomen. The pain was worse PO intake. Unnrelated to activity. She never experienced this type of pain before. Associated with mild nausea. Patient denies any vomiting, diarrhea, bleeding, or weight loss. Denies melena or hematochezia. No recent endoscopic evaluation. In ED noted to have leukocytosis, elevated lactic acid, acute renal failure, and CT scan with evidence of enteritis. Patient was admitted for further management. Today she states that she is feeling better. Pain is \"just about gone\". Lactic acid has normalized and kidney function improving.      PCP:  Jordon Linda MD    Past Medical History:   Diagnosis Date    Arrhythmia     Irregular HR-\"beats fast\"    CKD (chronic kidney disease) stage 3, GFR 30-59 ml/min 4/16/2017    Diabetes [facility]     GERD (gastroesophageal reflux disease)     High cholesterol     Hx of blood clots     Hypertension     PUD (peptic ulcer disease)     Sleep apnea     Will not use mask    Stroke ([address]     TIA        Past Surgical History:   Procedure Laterality Date    HX APPENDECTOMY      HX BACK SURGERY      lower back    HX HEMORRHOIDECTOMY      HX HYSTERECTOMY      HX ORTHOPAEDIC      knee surgery    HX ORTHOPAEDIC      carpal tunnel surgery    HX OTHER SURGICAL      HX OTHER SURGICAL  10/13/15    TTE: EF 55-60%, LA dilated       Social History   Substance Use Topics    Smoking status: Never Smoker    Smokeless tobacco: Never Used    Alcohol use No        Family History   Problem Relation Age of Onset    Parkinsonism Mother     Cancer Father      lung        Allergies   Allergen Reactions    Accupril [Quinapril] Unable to Obtain    Allopurinol Unknown (comments)    Demerol [Meperidine] Hives    Morphine Hives    Sulfamethoxazole-Trimethoprim Myalgia            Home Medications:  Prior to Admission Medications   Prescriptions Last Dose Informant Patient Reported? Taking?   acetaminophen (TYLENOL) 325 mg tablet 4/15/2017 at Unknown time  Yes Yes   Sig: Take  by mouth every four (4) hours as needed for Pain. ascorbic acid (VITAMIN C) 500 mg tablet 4/16/2017 at 0930  Yes Yes   Sig: Take 500 mg by mouth three (3) times daily. b complex vitamins tablet 4/16/2017 at 0930  Yes Yes   Sig: Take 1 Tab by mouth daily. calcium carbonate-vitamin D3 600 mg (1,500 mg)-800 unit chew 4/16/2017 at 0930  Yes Yes   Sig: Take 1 Tab by mouth daily. cholecalciferol, vitamin D3, (VITAMIN D3) 2,000 unit tab 4/16/2017 at 0930  Yes Yes   Sig: Take 1 Tab by mouth daily. febuxostat (ULORIC) 40 mg tab tablet 4/16/2017 at 0930  Yes Yes   Sig: Take 40 mg by mouth daily. ferrous gluconate (FERGON) 240 mg (27 mg iron) tablet 4/16/2017 at 0930  Yes Yes   Sig: Take 240 mg by mouth three (3) times daily (with meals). fish oil-dha-epa 1,200-144-216 mg cap 4/16/2017 at 0930  Yes Yes   Sig: Take  by mouth daily. folic acid 158 mcg tablet 4/16/2017 at 0930  Yes Yes   Sig: Take 400 mcg by mouth daily. furosemide (LASIX) 40 mg tablet 4/16/2017 at 0930  Yes Yes   Sig: Take 40 mg by mouth two (2) times a day.    metoprolol succinate (TOPROL XL) 25 mg XL tablet 4/16/2017 at 0930  Yes Yes   Sig: Take 12.5 mg by mouth daily. nystatin (MYCOSTATIN) powder   Yes Yes   Sig: Apply  to affected area two (2) times daily as needed. omeprazole (PRILOSEC) 40 mg capsule 4/16/2017 at 0930  Yes Yes   Sig: Take 40 mg by mouth daily. pravastatin (PRAVACHOL) 40 mg tablet 4/15/2017 at Unknown time  Yes Yes   Sig: Take 40 mg by mouth nightly. traMADol (ULTRAM) 50 mg tablet 4/9/2017 at Unknown time  Yes Yes   Sig: Take 50 mg by mouth every six (6) hours as needed for Pain.   triamcinolone acetonide (KENALOG) 0.1 % topical cream   Yes Yes   Sig: Apply  to affected area two (2) times daily as needed for Skin Irritation. use thin layer   trolamine salicylate (MYOFLEX) 10 % topical cream   Yes Yes   Sig: Apply  to affected area as needed. Apply to knees   warfarin (COUMADIN) 2.5 mg tablet 4/15/2017 at 1900  Yes Yes   Sig: Take 2.5 mg by mouth every Tuesday, Thursday, Saturday & Sunday. warfarin (COUMADIN) 5 mg tablet 4/14/2017 at 1900  Yes Yes   Sig: Take 5 mg by mouth every Monday, Wednesday, Friday.       Facility-Administered Medications: None       Hospital Medications:  Current Facility-Administered Medications   Medication Dose Route Frequency    0.9% sodium chloride infusion  100 mL/hr IntraVENous CONTINUOUS    sodium chloride (NS) flush 5-10 mL  5-10 mL IntraVENous Q8H    sodium chloride (NS) flush 5-10 mL  5-10 mL IntraVENous PRN    acetaminophen (TYLENOL) tablet 650 mg  650 mg Oral Q4H PRN    oxyCODONE-acetaminophen (PERCOCET) 5-325 mg per tablet 1 Tab  1 Tab Oral Q4H PRN    HYDROmorphone (PF) (DILAUDID) injection 0.5 mg  0.5 mg IntraVENous Q4H PRN    insulin lispro (HUMALOG) injection   SubCUTAneous AC&HS    glucose chewable tablet 16 g  4 Tab Oral PRN    dextrose (D50W) injection syrg 12.5-25 g  12.5-25 g IntraVENous PRN    glucagon (GLUCAGEN) injection 1 mg  1 mg IntraMUSCular PRN    febuxostat (ULORIC) tablet 40 mg  40 mg Oral DAILY    metoprolol succinate (TOPROL-XL) XL tablet 12.5 mg  12.5 mg Oral DAILY    pravastatin (PRAVACHOL) tablet 40 mg  40 mg Oral QHS    piperacillin-tazobactam (ZOSYN) 3.375 g in 0.9% sodium chloride (MBP/ADV) 100 mL  3.375 g IntraVENous Q12H    pantoprazole (PROTONIX) tablet 40 mg  40 mg Oral DAILY       Review of Systems: Admission ROS by Jesus Manuel Blankenship MD from 4/16/2017 were reviewed with the patient and changes (other than per HPI) include: none      Objective:     Physical Exam:  Visit Vitals    /54 (BP 1 Location: Right arm, BP Patient Position: At rest)    Pulse 92    Temp 98 °F (36.7 °C)    Resp 16    Ht 5' 3\" (1.6 m)    Wt 85.2 kg (187 lb 13.3 oz)    SpO2 92%    Breastfeeding No    BMI 33.27 kg/m2     SpO2 Readings from Last 6 Encounters:   04/18/17 92%   12/22/16 97%   05/27/16 95%   12/09/15 100%   11/20/15 96%   10/16/15 94%          Intake/Output Summary (Last 24 hours) at 04/18/17 1040  Last data filed at 04/18/17 6551   Gross per 24 hour   Intake          3633.34 ml   Output              650 ml   Net          2983.34 ml      General: no distress, comfortable  Skin:  No rash or palpable dermatologic mass lesions  HEENT: Pupils equal, sclera anicteric, oropharynx with no gross lesions  Cardiovascular:regular rate and rhythm  Respiratory:  No abnormal audible breath sounds, normal respiratory effort, no throacic deformity  GI:  Bowel sounds present, soft, mild tenderness midabdomen. No peritoneal signs. Musculoskeletal:  No skeletal deformity nor acute arthritis noted.   Neurological:  CN II-XII grossly intact, no focal deficits  Psychiatric:  Normal affect, memory intact, appears to have insight into current illness  Lymphatic:  No cervical or lymphadenopathy    Laboratory:    Recent Results (from the past 24 hour(s))   GLUCOSE, POC    Collection Time: 04/17/17 11:56 AM   Result Value Ref Range    Glucose (POC) 142 (H) 65 - 100 mg/dL    Performed by Kyaw Uriostegui (DISHA)    GLUCOSE, POC Collection Time: 04/17/17  6:17 PM   Result Value Ref Range    Glucose (POC) 118 (H) 65 - 100 mg/dL    Performed by Fernando Toledo    GLUCOSE, POC    Collection Time: 04/17/17  9:39 PM   Result Value Ref Range    Glucose (POC) 111 (H) 65 - 100 mg/dL    Performed by Sergio Costa    CBC WITH AUTOMATED DIFF    Collection Time: 04/18/17  4:21 AM   Result Value Ref Range    WBC 11.6 (H) 3.6 - 11.0 K/uL    RBC 3.01 (L) 3.80 - 5.20 M/uL    HGB 9.0 (L) 11.5 - 16.0 g/dL    HCT 27.9 (L) 35.0 - 47.0 %    MCV 92.7 80.0 - 99.0 FL    MCH 29.9 26.0 - 34.0 PG    MCHC 32.3 30.0 - 36.5 g/dL    RDW 15.4 (H) 11.5 - 14.5 %    PLATELET 045 669 - 540 K/uL    NEUTROPHILS 74 32 - 75 %    LYMPHOCYTES 18 12 - 49 %    MONOCYTES 8 5 - 13 %    EOSINOPHILS 0 0 - 7 %    BASOPHILS 0 0 - 1 %    ABS. NEUTROPHILS 8.5 (H) 1.8 - 8.0 K/UL    ABS. LYMPHOCYTES 2.1 0.8 - 3.5 K/UL    ABS. MONOCYTES 0.9 0.0 - 1.0 K/UL    ABS. EOSINOPHILS 0.1 0.0 - 0.4 K/UL    ABS.  BASOPHILS 0.0 0.0 - 0.1 K/UL   METABOLIC PANEL, BASIC    Collection Time: 04/18/17  4:21 AM   Result Value Ref Range    Sodium 144 136 - 145 mmol/L    Potassium 4.0 3.5 - 5.1 mmol/L    Chloride 110 (H) 97 - 108 mmol/L    CO2 22 21 - 32 mmol/L    Anion gap 12 5 - 15 mmol/L    Glucose 112 (H) 65 - 100 mg/dL    BUN 42 (H) 6 - 20 MG/DL    Creatinine 1.90 (H) 0.55 - 1.02 MG/DL    BUN/Creatinine ratio 22 (H) 12 - 20      GFR est AA 30 (L) >60 ml/min/1.73m2    GFR est non-AA 25 (L) >60 ml/min/1.73m2    Calcium 7.8 (L) 8.5 - 10.1 MG/DL   PROTHROMBIN TIME + INR    Collection Time: 04/18/17  7:44 AM   Result Value Ref Range    INR 3.8 (H) 0.9 - 1.1      Prothrombin time 39.9 (H) 9.0 - 11.1 sec   GLUCOSE, POC    Collection Time: 04/18/17  7:56 AM   Result Value Ref Range    Glucose (POC) 109 (H) 65 - 100 mg/dL    Performed by Barbee Libman          Assessment/Plan:     Principal Problem:    Enteritis (4/16/2017)    Active Problems:    HTN (hypertension) (10/8/2015)      DM (diabetes mellitus) ([facility] (10/8/2015)      High cholesterol (10/8/2015)      ARF (acute renal failure) ([address] (2017)      CKD (chronic kidney disease) stage 3, GFR 30-59 ml/min (2017)      SIRS (systemic inflammatory response syndrome) (H[facility]) (2017)      Elevated lactic acid level (2017)      STEPHANI (obstructive sleep apnea) (2017)      A-fib ([facility] (2017)      Chronic anticoagulation (2017)      Coagulopathy ([address] (2017)      Pyuria (2017)         See above narrative for full detail.     Leonarda Apgar, PA-C  17  10:40 AM

## 2017-04-18 NOTE — PROGRESS NOTES
[address] [place] [address], [place], [address] [place] [address]  [phone]      Medical Progress Note      NAME:         Howard Soto   :        1924  MRM:        [id number]    Date:          2017      Subjective: Patient has been seen and examined as a follow up for acute enteritis. Chart, labs, diagnostics reviewed. She feels a little better with less abdominal aching discomfort. No fever or chills. No bleeding. Objective:    Vital Signs:    Visit Vitals    /59    Pulse (!) 114    Temp 98 °F (36.7 °C)    Resp 16    Ht 5' 3\" (1.6 m)    Wt 85.2 kg (187 lb 13.3 oz)    SpO2 92%    Breastfeeding No    BMI 33.27 kg/m2          Intake/Output Summary (Last 24 hours) at 17 1703  Last data filed at 17 0930   Gross per 24 hour   Intake          3633.34 ml   Output              650 ml   Net          2983.34 ml        Physical Examination:    General:   Weak looking elderly female patient, not in any acute distress   Eyes:   pink conjunctivae, PERRLA with no discharge. ENT:   no ottorrhea or rhinorrhea with dry mucous membranes  Neck: no masses, thyroid non-tender and trachea central.  Pulm: decreased but clear breath sounds without crackles or wheezes  Card:  no JVD or murmurs, has regular and normal S1, S2 without thrills, bruits or peripheral edema  Abd:  Soft, minimal discomfort, non-distended, normoactive bowel sounds   Musc:  No cyanosis, clubbing, atrophy or deformities. Skin:  No rashes. Upper extremity bruising. No ulcers. Neuro: Awake and alert.  Generally a non focal exam. Follows commands appropriately  Psych:  Has a good insight and is oriented x 3    Current Facility-Administered Medications   Medication Dose Route Frequency    0.9% sodium chloride infusion  100 mL/hr IntraVENous CONTINUOUS    sodium chloride (NS) flush 5-10 mL  5-10 mL IntraVENous Q8H    sodium chloride (NS) flush 5-10 mL  5-10 mL IntraVENous PRN    acetaminophen (TYLENOL) tablet 650 mg  650 mg Oral Q4H PRN    oxyCODONE-acetaminophen (PERCOCET) 5-325 mg per tablet 1 Tab  1 Tab Oral Q4H PRN    HYDROmorphone (PF) (DILAUDID) injection 0.5 mg  0.5 mg IntraVENous Q4H PRN    insulin lispro (HUMALOG) injection   SubCUTAneous AC&HS    glucose chewable tablet 16 g  4 Tab Oral PRN    dextrose (D50W) injection syrg 12.5-25 g  12.5-25 g IntraVENous PRN    glucagon (GLUCAGEN) injection 1 mg  1 mg IntraMUSCular PRN    febuxostat (ULORIC) tablet 40 mg  40 mg Oral DAILY    metoprolol succinate (TOPROL-XL) XL tablet 12.5 mg  12.5 mg Oral DAILY    pravastatin (PRAVACHOL) tablet 40 mg  40 mg Oral QHS    piperacillin-tazobactam (ZOSYN) 3.375 g in 0.9% sodium chloride (MBP/ADV) 100 mL  3.375 g IntraVENous Q12H    pantoprazole (PROTONIX) tablet 40 mg  40 mg Oral DAILY        Laboratory data and review:    Recent Labs      04/18/17   0421  04/17/17   0334  04/16/17   1724   WBC  11.6*  11.6*  14.8*   HGB  9.0*  9.4*  12.0   HCT  27.9*  28.2*  35.5   PLT  225  214  263     Recent Labs      04/18/17   0744  04/18/17   0421  04/17/17   0334  04/16/17   1820  04/16/17   1724   NA   --   144  141   --   139   K   --   4.0  3.9   --   4.0   CL   --   110*  106   --   101   CO2   --   22  27   --   27   GLU   --   112*  144*   --   208*   BUN   --   42*  46*   --   46*   CREA   --   1.90*  2.34*   --   2.25*   CA   --   7.8*  8.1*   --   9.2   MG   --    --   1.6   --    --    PHOS   --    --   4.5   --    --    ALB   --    --    --    --   3.2*   SGOT   --    --    --    --   20   ALT   --    --    --    --   20   INR  3.8*   --   10.0*  9.1*   --      No components found for: [place]    Other Diagnostics:    CT scan abdomen and pelvis - Long segment area of small bowel thickening in the mid small bowel with  adjacent mesenteric edema and small amount of free fluid. Findings are nonspecific but likely infectious or inflammatory.  Ischemia is not excluded. No drainable fluid collection    Telemetry reviewed:   normal sinus rhythm    Assessment and Plan:    Enteritis (4/16/2017)/ SIRS (systemic inflammatory response syndrome) ([facility]) (4/16/2017)/ Elevated lactic acid level (4/16/2017) POA: acute involving small bowel. Unclear if infectious, inflammatory vs ischemic. No diarrhea. Seen by general surgery and GI. Clinically making some progress. No planned procedure. Continue IV antibiotics and monitor. Chronic anticoagulation (4/16/2017)/Coagulopathy ([facility] (4/16/2017): due to Warfarin. Sp vit K. INR better. No bleeding. Continue to monitor Hgb. Transfuse FFPs if bleeding occurs    HTN (hypertension) (10/8/2015): BP stable. 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 Still NPO. Continue to monitor blood glucose. SSI per protocol    High cholesterol (10/8/2015): continue Simvastatin    ARF (acute renal failure) ([facility]) (4/16/2017)/ CKD (chronic kidney disease) stage 3, GFR 30-59 ml/min (4/16/2017): Cr better. Continue IV fluids. Monitor renal function    STEPHANI (obstructive sleep apnea) (4/16/2017): has declined to use CPAP    A-fib ([facility] (4/16/2017): chronic. Rate controlled. Continue metoprolol. Coumadin on hold due to coagulopathy    Pyuria (4/17/2017): Urine Cx isolating Gram neg rods.  Continue empiric IV antibiotics    Code status: patient is FULL CODE    Total time spent for the patient's care: [address] discussed with: Patient, Family and Nursing Staff    Discussed:  Care Plan and D/C Planning    Prophylaxis:  H2B/PPI    Anticipated Disposition:  Home w/Family vsTBD           ___________________________________________________    Attending Physician:   Khadijah Jacinto MD

## 2017-04-19 LAB
ANION GAP BLD CALC-SCNC: 14 MMOL/L (ref 5–15)
BACTERIA SPEC CULT: ABNORMAL
BACTERIA SPEC CULT: ABNORMAL
BUN SERPL-MCNC: 35 MG/DL (ref 6–20)
BUN/CREAT SERPL: 21 (ref 12–20)
CALCIUM SERPL-MCNC: 7.9 MG/DL (ref 8.5–10.1)
CC UR VC: ABNORMAL
CHLORIDE SERPL-SCNC: 111 MMOL/L (ref 97–108)
CO2 SERPL-SCNC: 21 MMOL/L (ref 21–32)
CREAT SERPL-MCNC: 1.69 MG/DL (ref 0.55–1.02)
ERYTHROCYTE [DISTWIDTH] IN BLOOD BY AUTOMATED COUNT: 15.5 % (ref 11.5–14.5)
GLUCOSE BLD STRIP.AUTO-MCNC: 127 MG/DL (ref 65–100)
GLUCOSE BLD STRIP.AUTO-MCNC: 135 MG/DL (ref 65–100)
GLUCOSE BLD STRIP.AUTO-MCNC: 137 MG/DL (ref 65–100)
GLUCOSE BLD STRIP.AUTO-MCNC: 174 MG/DL (ref 65–100)
GLUCOSE BLD STRIP.AUTO-MCNC: 73 MG/DL (ref 65–100)
GLUCOSE SERPL-MCNC: 79 MG/DL (ref 65–100)
HCT VFR BLD AUTO: 27.5 % (ref 35–47)
HEMOCCULT STL QL: POSITIVE
HGB BLD-MCNC: 8.9 G/DL (ref 11.5–16)
IRON SATN MFR SERPL: 22 % (ref 20–50)
IRON SERPL-MCNC: 33 UG/DL (ref 35–150)
MCH RBC QN AUTO: 30.4 PG (ref 26–34)
MCHC RBC AUTO-ENTMCNC: 32.4 G/DL (ref 30–36.5)
MCV RBC AUTO: 93.9 FL (ref 80–99)
PLATELET # BLD AUTO: 243 K/UL (ref 150–400)
POTASSIUM SERPL-SCNC: 4 MMOL/L (ref 3.5–5.1)
RBC # BLD AUTO: 2.93 M/UL (ref 3.8–5.2)
SERVICE CMNT-IMP: ABNORMAL
SERVICE CMNT-IMP: NORMAL
SODIUM SERPL-SCNC: 146 MMOL/L (ref 136–145)
TIBC SERPL-MCNC: 150 UG/DL (ref 250–450)
WBC # BLD AUTO: 9.1 K/UL (ref 3.6–11)

## 2017-04-19 PROCEDURE — 74011000250 HC RX REV CODE- 250: Performed by: INTERNAL MEDICINE

## 2017-04-19 PROCEDURE — 80048 BASIC METABOLIC PNL TOTAL CA: CPT | Performed by: PHYSICIAN ASSISTANT

## 2017-04-19 PROCEDURE — 82962 GLUCOSE BLOOD TEST: CPT

## 2017-04-19 PROCEDURE — 74011250637 HC RX REV CODE- 250/637: Performed by: PHYSICIAN ASSISTANT

## 2017-04-19 PROCEDURE — 74011000258 HC RX REV CODE- 258: Performed by: INTERNAL MEDICINE

## 2017-04-19 PROCEDURE — 97535 SELF CARE MNGMENT TRAINING: CPT

## 2017-04-19 PROCEDURE — 77030012890

## 2017-04-19 PROCEDURE — 36415 COLL VENOUS BLD VENIPUNCTURE: CPT | Performed by: PHYSICIAN ASSISTANT

## 2017-04-19 PROCEDURE — 65660000000 HC RM CCU STEPDOWN

## 2017-04-19 PROCEDURE — 97530 THERAPEUTIC ACTIVITIES: CPT

## 2017-04-19 PROCEDURE — 85027 COMPLETE CBC AUTOMATED: CPT | Performed by: PHYSICIAN ASSISTANT

## 2017-04-19 PROCEDURE — 74011250637 HC RX REV CODE- 250/637: Performed by: INTERNAL MEDICINE

## 2017-04-19 PROCEDURE — 82272 OCCULT BLD FECES 1-3 TESTS: CPT | Performed by: PHYSICIAN ASSISTANT

## 2017-04-19 PROCEDURE — 77030020186 HC BOOT HL PROTCT SAGE -B

## 2017-04-19 PROCEDURE — 83540 ASSAY OF IRON: CPT | Performed by: PHYSICIAN ASSISTANT

## 2017-04-19 PROCEDURE — 97116 GAIT TRAINING THERAPY: CPT

## 2017-04-19 PROCEDURE — 74011250636 HC RX REV CODE- 250/636: Performed by: INTERNAL MEDICINE

## 2017-04-19 RX ORDER — FACIAL-BODY WIPES
10 EACH TOPICAL DAILY
Status: DISCONTINUED | OUTPATIENT
Start: 2017-04-19 | End: 2017-04-21

## 2017-04-19 RX ADMIN — PIPERACILLIN SODIUM,TAZOBACTAM SODIUM 3.38 G: 3; .375 INJECTION, POWDER, FOR SOLUTION INTRAVENOUS at 08:07

## 2017-04-19 RX ADMIN — FEBUXOSTAT 40 MG: 40 TABLET ORAL at 09:23

## 2017-04-19 RX ADMIN — Medication 5 ML: at 06:00

## 2017-04-19 RX ADMIN — PRAVASTATIN SODIUM 40 MG: 20 TABLET ORAL at 21:24

## 2017-04-19 RX ADMIN — PIPERACILLIN SODIUM,TAZOBACTAM SODIUM 3.38 G: 3; .375 INJECTION, POWDER, FOR SOLUTION INTRAVENOUS at 21:28

## 2017-04-19 RX ADMIN — DEXTROSE MONOHYDRATE 12.5 G: 25 INJECTION, SOLUTION INTRAVENOUS at 08:03

## 2017-04-19 RX ADMIN — SODIUM CHLORIDE 100 ML/HR: 900 INJECTION, SOLUTION INTRAVENOUS at 08:15

## 2017-04-19 RX ADMIN — SODIUM CHLORIDE 100 ML/HR: 900 INJECTION, SOLUTION INTRAVENOUS at 21:27

## 2017-04-19 RX ADMIN — BISACODYL 10 MG: 10 SUPPOSITORY RECTAL at 12:47

## 2017-04-19 RX ADMIN — PANTOPRAZOLE SODIUM 40 MG: 40 TABLET, DELAYED RELEASE ORAL at 09:23

## 2017-04-19 RX ADMIN — ACETAMINOPHEN 650 MG: 325 TABLET ORAL at 05:24

## 2017-04-19 RX ADMIN — Medication 10 ML: at 21:27

## 2017-04-19 RX ADMIN — METOPROLOL SUCCINATE 12.5 MG: 25 TABLET, FILM COATED, EXTENDED RELEASE ORAL at 09:23

## 2017-04-19 NOTE — PROGRESS NOTES
4/19/2017 3:28 PM PT and OT recommendations for rehab placement noted. Discussed with pt's attending who was in agreement and tentative discharge on 4/20. Met with pt and pt's daughter to discuss rehab. Pt's preference is [facility]. Pt selected [facility] and [facility] has back ups if [facility] cannot accept. Referrals sent to all facilities via [employer]. CM will follow up.  RADHA Holloway

## 2017-04-19 NOTE — PROGRESS NOTES
Problem: Mobility Impaired (Adult and Pediatric)  Goal: *Acute Goals and Plan of Care (Insert Text)  Physical Therapy Goals  Initiated 4/17/2017  1. Patient will move from supine to sit and sit to supine , scoot up and down and roll side to side in bed with independence within 7 day(s). 2. Patient will transfer from bed to chair and chair to bed with modified independence using the least restrictive device within 7 day(s). 3. Patient will perform sit to stand with modified independence within 7 day(s). 4. Patient will ambulate with modified independence for 200 feet with the least restrictive device within 7 day(s). PHYSICAL THERAPY TREATMENT  Patient: Rosalina Lee (57 y.o. female)  Date: 4/19/2017  Diagnosis: Enteritis Enteritis       Precautions:        ASSESSMENT:  Patient with increased fatigue today and increased assistance with upright activity. Required MOD A for sit-stand and MIN A for ambulation. Increased verbal cuing for RW management due to increased anterior displacement of the RW. She has dyspnea on exertion but vitals stable at 93% on room air and 98 bpm.  Patient would benefit from rehab at discharge due to her continued decreased mobility. Progression toward goals:  [ ]    Improving appropriately and progressing toward goals  [X]    Improving slowly and progressing toward goals  [ ]    Not making progress toward goals and plan of care will be adjusted       PLAN:  Patient continues to benefit from skilled intervention to address the above impairments. Continue treatment per established plan of care.   Discharge Recommendations:  Rehab  Further Equipment Recommendations for Discharge:  TBD       SUBJECTIVE:   Patient stated Marvinell Alu i try and use the bathroom first.      OBJECTIVE DATA SUMMARY:   Critical Behavior:  Neurologic State: Alert  Orientation Level: Oriented X4  Cognition: Appropriate decision making, Appropriate for age attention/concentration, Appropriate safety awareness Functional Mobility Training:  Bed Mobility:  Rolling: Minimum assistance                 Transfers:  Sit to Stand: Moderate assistance  Stand to Sit: Moderate assistance        Bed to Chair: Minimum assistance                    Balance:     Ambulation/Gait Training:  Distance (ft): 50 Feet (ft)  Assistive Device: Walker, rolling;Gait belt  Ambulation - Level of Assistance: Minimal assistance                                                          Stairs:                       Neuro Re-Education:     Therapeutic Exercises: Ankle pumps, LAQ, marching  1 set of 10 bilateral LE  Pain:  Pain Scale 1: Numeric (0 - 10)  Pain Intensity 1: 0  Pain Location 1: Hand        Pain Intervention(s) 1: Medication (see MAR)  Activity Tolerance:   Good- no complications with upright activity  Please refer to the flowsheet for vital signs taken during this treatment.   After treatment:   [X]    Patient left in no apparent distress sitting up in chair  [ ]    Patient left in no apparent distress in bed  [X]    Call bell left within reach  [X]    Nursing notified  [ ]    Caregiver present  [X]    Chair alarm activated      COMMUNICATION/COLLABORATION:   The patients plan of care was discussed with: Registered Nurse     Jonathan Greenberg PT, DPT   Time Calculation: 20 mins

## 2017-04-19 NOTE — PROGRESS NOTES
Gastroenterology Progress Note    April 19, 2017  Admit Date: 4/16/2017         Narrative Assessment and Plan   · Acute enteritis  · Anemia     Clinically improving. Leukocytosis resolved. Vital signs stable. Anemia present - Hgb 8.9 (yesterday 9), iron profile reviewed (slight decrease serum iron, iron sat normal)    Plan  - continue with supportive care  - diet advance per surgery to clear liquids  - continue to follow patient clinical course  - await results FOBT    Subjective:   · Patient resting in bed. States she is feeling much better than when she came in. Minimal abdominal pain. Denies N/V. Advanced to clears this morning. ROS:  The previous review of systems on initial consultation / H&P is noted and reviewed. Specific changes noted above in HPI.     Current Medications:     Current Facility-Administered Medications   Medication Dose Route Frequency    bisacodyl (DULCOLAX) suppository 10 mg  10 mg Rectal DAILY    0.9% sodium chloride infusion  100 mL/hr IntraVENous CONTINUOUS    sodium chloride (NS) flush 5-10 mL  5-10 mL IntraVENous Q8H    sodium chloride (NS) flush 5-10 mL  5-10 mL IntraVENous PRN    acetaminophen (TYLENOL) tablet 650 mg  650 mg Oral Q4H PRN    oxyCODONE-acetaminophen (PERCOCET) 5-325 mg per tablet 1 Tab  1 Tab Oral Q4H PRN    HYDROmorphone (PF) (DILAUDID) injection 0.5 mg  0.5 mg IntraVENous Q4H PRN    insulin lispro (HUMALOG) injection   SubCUTAneous AC&HS    glucose chewable tablet 16 g  4 Tab Oral PRN    dextrose (D50W) injection syrg 12.5-25 g  12.5-25 g IntraVENous PRN    glucagon (GLUCAGEN) injection 1 mg  1 mg IntraMUSCular PRN    febuxostat (ULORIC) tablet 40 mg  40 mg Oral DAILY    metoprolol succinate (TOPROL-XL) XL tablet 12.5 mg  12.5 mg Oral DAILY    pravastatin (PRAVACHOL) tablet 40 mg  40 mg Oral QHS    piperacillin-tazobactam (ZOSYN) 3.375 g in 0.9% sodium chloride (MBP/ADV) 100 mL  3.375 g IntraVENous Q12H    pantoprazole (PROTONIX) tablet 40 mg  40 mg Oral DAILY       Objective:     VITALS:   Last 24hrs VS reviewed since prior progress note. Most recent are:  Visit Vitals    /56 (BP 1 Location: Left arm, BP Patient Position: At rest)    Pulse 81    Temp 97.6 °F (36.4 °C)    Resp 18    Ht 5' 3\" (1.6 m)    Wt 85.2 kg (187 lb 13.3 oz)    SpO2 97%    Breastfeeding No    BMI 33.27 kg/m2     Temp (24hrs), Av.6 °F (36.4 °C), Min:97.5 °F (36.4 °C), Max:97.8 °F (36.6 °C)    No intake or output data in the 24 hours ending 17 0938    EXAM:  General: Comfortable, no distress    HEENT: Atraumatic skull, pupils equal  Lungs:  Non respiratory distress. Speaking in complete sentences  Abdomen: Hypoactive BS, soft, minimal distention, mild tenderness midabdomen.   No mass, guarding or rebound  Musc:  No skeletal defects or deformities  Neurologic:  Cranial nerves grossly intact, moves all 4 extremities  Psych:   Good insight. Not anxious nor agitated    Lab Data Reviewed:   Recent Labs      17   0511  17   0421  17   0334   WBC  9.1  11.6*  11.6*   HGB  8.9*  9.0*  9.4*   HCT  27.5*  27.9*  28.2*   PLT  243  225  214     Recent Labs      17   0511  17   0744  17   0421  17   0334  17   1820  17   1724   NA  146*   --   144  141   --   139   K  4.0   --   4.0  3.9   --   4.0   CL  111*   --   110*  106   --   101   CO2  21   --   22  27   --   27   GLU  79   --   112*  144*   --   208*   BUN  35*   --   42*  46*   --   46*   CREA  1.69*   --   1.90*  2.34*   --   2.25*   CA  7.9*   --   7.8*  8.1*   --   9.2   MG   --    --    --   1.6   --    --    PHOS   --    --    --   4.5   --    --    ALB   --    --    --    --    --   3.2*   TBILI   --    --    --    --    --   0.7   SGOT   --    --    --    --    --   20   ALT   --    --    --    --    --   20   INR   --   3.8*   --   10.0*  9.1*   --      Lab Results   Component Value Date/Time    Glucose (POC) 127 2017 08:15 AM    Glucose (POC) 73 2017 07:58 AM    Glucose (POC) 106 2017 09:21 PM    Glucose (POC) 92 2017 04:45 PM    Glucose (POC) 105 2017 11:56 AM     No results for input(s): PH, PCO2, PO2, HCO3, FIO2 in the last 72 hours.   Recent Labs      17   0744  17   0334  17   1820   INR  3.8*  10.0*  9.1*           Assessment:   (See above)  Principal Problem:    Enteritis (2017)    Active Problems:    HTN (hypertension) (10/8/2015)      DM (diabetes mellitus) ([address] (10/8/2015)      High cholesterol (10/8/2015)      ARF (acute renal failure) ([address] (2017)      CKD (chronic kidney disease) stage 3, GFR 30-59 ml/min (2017)      SIRS (systemic inflammatory response syndrome) (HCC) (2017)      Elevated lactic acid level (2017)      STEPHANI (obstructive sleep apnea) (2017)      A-fib ([address] (2017)      Chronic anticoagulation (2017)      Coagulopathy ([address] (2017)      Pyuria (2017)        Plan:   (See above)    Signed By:  Leonarda Apgar, PA-C  2017  9:38 AM

## 2017-04-19 NOTE — PROGRESS NOTES
General Surgery Daily Progress Note    Patient: Clemente Avendaño MRN: [id number]  SSN: [id number]    YOB: 1924  Age: 80 y.o. Sex: female      Admit Date: 4/16/2017    Subjective:   Patient states that she does not have any pain, and she does not appear to have had any pain meds. She has not had any BM. Current Facility-Administered Medications   Medication Dose Route Frequency    bisacodyl (DULCOLAX) suppository 10 mg  10 mg Rectal DAILY    0.9% sodium chloride infusion  100 mL/hr IntraVENous CONTINUOUS    sodium chloride (NS) flush 5-10 mL  5-10 mL IntraVENous Q8H    sodium chloride (NS) flush 5-10 mL  5-10 mL IntraVENous PRN    acetaminophen (TYLENOL) tablet 650 mg  650 mg Oral Q4H PRN    oxyCODONE-acetaminophen (PERCOCET) 5-325 mg per tablet 1 Tab  1 Tab Oral Q4H PRN    HYDROmorphone (PF) (DILAUDID) injection 0.5 mg  0.5 mg IntraVENous Q4H PRN    insulin lispro (HUMALOG) injection   SubCUTAneous AC&HS    glucose chewable tablet 16 g  4 Tab Oral PRN    dextrose (D50W) injection syrg 12.5-25 g  12.5-25 g IntraVENous PRN    glucagon (GLUCAGEN) injection 1 mg  1 mg IntraMUSCular PRN    febuxostat (ULORIC) tablet 40 mg  40 mg Oral DAILY    metoprolol succinate (TOPROL-XL) XL tablet 12.5 mg  12.5 mg Oral DAILY    pravastatin (PRAVACHOL) tablet 40 mg  40 mg Oral QHS    piperacillin-tazobactam (ZOSYN) 3.375 g in 0.9% sodium chloride (MBP/ADV) 100 mL  3.375 g IntraVENous Q12H    pantoprazole (PROTONIX) tablet 40 mg  40 mg Oral DAILY        Objective:      04/17 1901 - 04/19 0700  In: 3633.3 [P.O.:240; I.V.:3393.3]  Out: 650 [Urine:650]  Patient Vitals for the past 8 hrs:   BP Temp Pulse Resp SpO2   04/19/17 0753 109/56 97.6 °F (36.4 °C) 81 18 97 %   04/19/17 0711 - - 78 - -   04/19/17 0328 140/63 97.5 °F (36.4 °C) 95 16 92 %       Physical Exam:  General: Alert, cooperative, no distress, appears stated age.   Neck:  Supple, symmetrical, trachea midline, no adenopathy, thyroid: no enlargement/tenderness/nodules, no carotid bruit and no JVD. Lungs: Clear to auscultation bilaterally. Heart:  Regular rate and rhythm, S1, S2 normal, no murmur, click, rub or gallop. Abdomen: Mild tenderness to the left of the umbilicus with no guarding or rebound. Soft, non-tender. Bowel sounds normal. No masses,  No organomegaly. Extremities: Extremities normal, atraumatic, no cyanosis or edema. Skin:  Skin color, texture, turgor normal. No rashes or lesions    Labs: Recent Labs      04/19/17   0511   WBC  9.1   HGB  8.9*   HCT  27.5*   PLT  243     Recent Labs      04/19/17   0511   04/17/17   0334  04/16/17   1724   NA  146*   < >  141  139   K  4.0   < >  3.9  4.0   CL  111*   < >  106  101   CO2  21   < >  27  27   GLU  79   < >  144*  208*   BUN  35*   < >  46*  46*   CREA  1.69*   < >  2.34*  2.25*   CA  7.9*   < >  8.1*  9.2   MG   --    --   1.6   --    PHOS   --    --   4.5   --    ALB   --    --    --   3.2*   TBILI   --    --    --   0.7   SGOT   --    --    --   20   ALT   --    --    --   20    < > = values in this interval not displayed. X-ray   Assessment / Plan:   · Currently no evidence of an acute abdomen  · Would advance diet and see how she progresses.      Principal Problem:    Enteritis (4/16/2017)    Active Problems:    HTN (hypertension) (10/8/2015)      DM (diabetes mellitus) ([facility] (10/8/2015)      High cholesterol (10/8/2015)      ARF (acute renal failure) ([facility] (4/16/2017)      CKD (chronic kidney disease) stage 3, GFR 30-59 ml/min (4/16/2017)      SIRS (systemic inflammatory response syndrome) (HCC) (4/16/2017)      Elevated lactic acid level (4/16/2017)      STEPHANI (obstructive sleep apnea) (4/16/2017)      A-fib ([facility] (4/16/2017)      Chronic anticoagulation (4/16/2017)      Coagulopathy ([facility] (4/16/2017)      Pyuria (4/17/2017)

## 2017-04-19 NOTE — PROGRESS NOTES
Problem: Self Care Deficits Care Plan (Adult)  Goal: *Acute Goals and Plan of Care (Insert Text)  Occupational Therapy Goals  Initiated 4/18/2017  1. Patient will perform grooming in standing at sink x 5 mins with modified indep within 7 day(s). 2. Patient will perform lower body dressing with modified indep within 7 day(s). 3. Patient will perform toilet transfers with modified indep within 7 day(s). 4. Patient will perform all aspects of toileting with modified indep within 7 day(s). 5. Patient will utilize energy conservation techniques during functional activities with verbal cues within 7 day(s). OCCUPATIONAL THERAPY TREATMENT  Patient: Stephanie Bell (29 y.o. female)  Date: 4/19/2017  Diagnosis: Enteritis Enteritis       Precautions:  Fall      ASSESSMENT:  Nursing cleared patient for therapy. Patient received sitting in bed, reports fatigue that she has done more today with therapy and nursing. Reports need for BM. Completed toileting training. Patient needing additional assistance for sit > stand compared to previous session. Mod A for sit > stand and max A for toileting hygiene while in standing secondary to large watery BM. Returned to bed with mod A for LB mgmt. Discussed dc planning regarding recommendation of rehab. Patient in agreement, reporting her family cannot offer physical assistance. She is agreeable to short term rehab prior to returning home to increase her functional back to PLOF. Demo NOE with SpO2 on RA 91% following toileting however possibly higher secondary to impaired reading with cold fingers. Progression toward goals:  [X]       Improving appropriately and progressing toward goals  [ ]       Improving slowly and progressing toward goals  [ ]       Not making progress toward goals and plan of care will be adjusted       PLAN:  Patient continues to benefit from skilled intervention to address the above impairments.   Continue treatment per established plan of care.  Discharge Recommendations:  Rehab  Further Equipment Recommendations for Discharge:  TBD rehab       SUBJECTIVE:   Patient stated You know, now that you mention it I am a little more tired today.       OBJECTIVE DATA SUMMARY:   Cognitive/Behavioral Status:  Alert and oriented x 4        Functional Mobility and Transfers for ADLs:  Bed Mobility:  Rolling: Minimum assistance  Sit to Supine: Moderate assistance     Transfers:  Sit to Stand: Moderate assistance  Functional Transfers  Toilet Transfer : Moderate assistance     Balance:  Sitting: Intact  Standing: Intact; With support     ADL Intervention:     Provided education on toileting tasks through verbal cues for posture, sequencing and safety. COmpleted at [facility] level. Amb to commode with CGA at  level slow pace, stand > sit with CGA, sit > stand with mod A and hygiene in standing with max A secondary to watery stool. Returned to bed, needing mod A for LB mgmt. Patient with carry over of training for toileting tasks.        Toileting  Toileting Assistance: Maximum assistance  Bowel Hygiene: Maximum assistance  Clothing Management: Moderate assistance  Adaptive Equipment: Walker;Grab bars     Pain:  Pain Scale 1: Numeric (0 - 10)  Pain Intensity 1: 0  Pain Location 1: Hand  Pain Intervention(s) 1: Medication (see MAR)     Activity Tolerance:   SpO2 91% on RA     After treatment:   [ ] Patient left in no apparent distress sitting up in chair  [X] Patient left in no apparent distress in bed  [X] Call bell left within reach  [X] Nursing notified  [ ] Caregiver present  [X] Bed alarm activated      COMMUNICATION/COLLABORATION:   The patients plan of care was discussed with: Registered Nurse,  and Patient     Tomi Padilla OT  Time Calculation: 27 mins

## 2017-04-19 NOTE — DIABETES MGMT
DTC Progress Note    Recommendations/ Comments:  Chart reviewed on [address] for hypoglycemia (glucose less than 80 mg/dL x 1 in the past 24 hours). [address] is a 80 y.o. female with a past medical history significant for Type 2 Diabetes per Dr. Beka Munson MD's H&P dated 4/16/2017. Received 0 units of insulin in the past 3 days. Poor po intake noted. Hospital (inpatient) medications:  1. Correction Scale: Lispro (Humalog) High Sensitivity scale (thin, ESRD) to cover for glucose > 199 mg/dL  before meals and for glucose >199 at bedtime      Prior to admission medications: per past medical records  -none for DM       A1C:   Lab Results   Component Value Date/Time    Hemoglobin A1c 6.6 04/16/2017 09:49 PM     Reference range*:  Increased risk for diabetes: 5.7 - 6.4%  Diabetes: >6.4%  Glycemic control for adults with diabetes: <7.0 %    *[facility] (2014). Diagnosis and classification of diabetes mellitus. Diabetes care, 37, S81. Recent Glucose Results: Lab Results   Component Value Date/Time    GLU 79 04/19/2017 05:11 AM    GLUCPOC 174 (H) 04/19/2017 11:44 AM    GLUCPOC 127 (H) 04/19/2017 08:15 AM    GLUCPOC 73 04/19/2017 07:58 AM      Lab Results   Component Value Date/Time    Creatinine 1.69 04/19/2017 05:11 AM     Active Orders   Diet    DIET FULL LIQUID      PO intake: Patient Vitals for the past 72 hrs:   % Diet Eaten   04/18/17 0930 0 %       Thank you. Marcie Krishna.  Lyle Yap, RN, BSN, MPH  Diabetes [address] [place] [address]  [phone]

## 2017-04-20 ENCOUNTER — APPOINTMENT (OUTPATIENT)
Dept: CT IMAGING | Age: 82
DRG: 391 | End: 2017-04-20
Attending: INTERNAL MEDICINE
Payer: MEDICARE

## 2017-04-20 LAB
BASOPHILS # BLD AUTO: 0 K/UL (ref 0–0.1)
BASOPHILS # BLD: 0 % (ref 0–1)
EOSINOPHIL # BLD: 0.1 K/UL (ref 0–0.4)
EOSINOPHIL NFR BLD: 1 % (ref 0–7)
ERYTHROCYTE [DISTWIDTH] IN BLOOD BY AUTOMATED COUNT: 15.4 % (ref 11.5–14.5)
GLUCOSE BLD STRIP.AUTO-MCNC: 119 MG/DL (ref 65–100)
GLUCOSE BLD STRIP.AUTO-MCNC: 146 MG/DL (ref 65–100)
GLUCOSE BLD STRIP.AUTO-MCNC: 155 MG/DL (ref 65–100)
GLUCOSE BLD STRIP.AUTO-MCNC: 194 MG/DL (ref 65–100)
HCT VFR BLD AUTO: 28.3 % (ref 35–47)
HGB BLD-MCNC: 9.1 G/DL (ref 11.5–16)
INR PPP: 2.9 (ref 0.9–1.1)
LYMPHOCYTES # BLD AUTO: 19 % (ref 12–49)
LYMPHOCYTES # BLD: 1.9 K/UL (ref 0.8–3.5)
MCH RBC QN AUTO: 29.9 PG (ref 26–34)
MCHC RBC AUTO-ENTMCNC: 32.2 G/DL (ref 30–36.5)
MCV RBC AUTO: 93.1 FL (ref 80–99)
MONOCYTES # BLD: 0.9 K/UL (ref 0–1)
MONOCYTES NFR BLD AUTO: 9 % (ref 5–13)
NEUTS SEG # BLD: 6.9 K/UL (ref 1.8–8)
NEUTS SEG NFR BLD AUTO: 71 % (ref 32–75)
PLATELET # BLD AUTO: 269 K/UL (ref 150–400)
PROTHROMBIN TIME: 30.2 SEC (ref 9–11.1)
RBC # BLD AUTO: 3.04 M/UL (ref 3.8–5.2)
SERVICE CMNT-IMP: ABNORMAL
WBC # BLD AUTO: 9.8 K/UL (ref 3.6–11)

## 2017-04-20 PROCEDURE — 36415 COLL VENOUS BLD VENIPUNCTURE: CPT | Performed by: INTERNAL MEDICINE

## 2017-04-20 PROCEDURE — 97116 GAIT TRAINING THERAPY: CPT

## 2017-04-20 PROCEDURE — 65660000000 HC RM CCU STEPDOWN

## 2017-04-20 PROCEDURE — 74011000258 HC RX REV CODE- 258: Performed by: INTERNAL MEDICINE

## 2017-04-20 PROCEDURE — 85610 PROTHROMBIN TIME: CPT | Performed by: INTERNAL MEDICINE

## 2017-04-20 PROCEDURE — 97530 THERAPEUTIC ACTIVITIES: CPT

## 2017-04-20 PROCEDURE — 74011250637 HC RX REV CODE- 250/637: Performed by: INTERNAL MEDICINE

## 2017-04-20 PROCEDURE — 70450 CT HEAD/BRAIN W/O DYE: CPT

## 2017-04-20 PROCEDURE — 74011250636 HC RX REV CODE- 250/636: Performed by: INTERNAL MEDICINE

## 2017-04-20 PROCEDURE — 85025 COMPLETE CBC W/AUTO DIFF WBC: CPT | Performed by: INTERNAL MEDICINE

## 2017-04-20 PROCEDURE — 82962 GLUCOSE BLOOD TEST: CPT

## 2017-04-20 RX ORDER — WARFARIN SODIUM 5 MG/1
2.5 TABLET ORAL ONCE
Status: COMPLETED | OUTPATIENT
Start: 2017-04-20 | End: 2017-04-20

## 2017-04-20 RX ORDER — LANOLIN ALCOHOL/MO/W.PET/CERES
1 CREAM (GRAM) TOPICAL
Status: DISCONTINUED | OUTPATIENT
Start: 2017-04-21 | End: 2017-04-26 | Stop reason: HOSPADM

## 2017-04-20 RX ORDER — METRONIDAZOLE 250 MG/1
500 TABLET ORAL EVERY 6 HOURS
Status: DISCONTINUED | OUTPATIENT
Start: 2017-04-20 | End: 2017-04-26 | Stop reason: HOSPADM

## 2017-04-20 RX ADMIN — LEVOFLOXACIN 750 MG: 500 TABLET, FILM COATED ORAL at 18:44

## 2017-04-20 RX ADMIN — FEBUXOSTAT 40 MG: 40 TABLET ORAL at 08:03

## 2017-04-20 RX ADMIN — METRONIDAZOLE 500 MG: 250 TABLET, FILM COATED ORAL at 12:47

## 2017-04-20 RX ADMIN — PANTOPRAZOLE SODIUM 40 MG: 40 TABLET, DELAYED RELEASE ORAL at 08:03

## 2017-04-20 RX ADMIN — WARFARIN SODIUM 2.5 MG: 5 TABLET ORAL at 18:44

## 2017-04-20 RX ADMIN — PIPERACILLIN SODIUM,TAZOBACTAM SODIUM 3.38 G: 3; .375 INJECTION, POWDER, FOR SOLUTION INTRAVENOUS at 08:00

## 2017-04-20 RX ADMIN — METOPROLOL SUCCINATE 12.5 MG: 25 TABLET, FILM COATED, EXTENDED RELEASE ORAL at 08:03

## 2017-04-20 RX ADMIN — PRAVASTATIN SODIUM 40 MG: 20 TABLET ORAL at 22:13

## 2017-04-20 RX ADMIN — METRONIDAZOLE 500 MG: 250 TABLET, FILM COATED ORAL at 18:44

## 2017-04-20 RX ADMIN — ACETAMINOPHEN 650 MG: 325 TABLET ORAL at 20:11

## 2017-04-20 NOTE — PROGRESS NOTES
Problem: Mobility Impaired (Adult and Pediatric)  Goal: *Acute Goals and Plan of Care (Insert Text)  Physical Therapy Goals  Initiated 4/17/2017  1. Patient will move from supine to sit and sit to supine , scoot up and down and roll side to side in bed with independence within 7 day(s). 2. Patient will transfer from bed to chair and chair to bed with modified independence using the least restrictive device within 7 day(s). 3. Patient will perform sit to stand with modified independence within 7 day(s). 4. Patient will ambulate with modified independence for 200 feet with the least restrictive device within 7 day(s). PHYSICAL THERAPY TREATMENT  Patient: Jammie Jones (24 y.o. female)  Date: 4/20/2017  Diagnosis: Enteritis Enteritis       Precautions:        ASSESSMENT:  Pt receied in bed agreeable to participate with physical therapy. Min A with vc for sequencing for bed mobility. Sit<>stand uisng RW for steadying with Min A. BAthroom transfer with min a usign Grab bars for steadying. 2 bouts of Gait training x 20' each with seated rest break between bouts. SOB with minimal activity, frequent v of PLB, O2 sats 95% -120 during activity. Pt returned to chair, chair alarm in place. Nursing notified. Progression toward goals:  [ ]    Improving appropriately and progressing toward goals  [X]    Improving slowly and progressing toward goals  [ ]    Not making progress toward goals and plan of care will be adjusted       PLAN:  Patient continues to benefit from skilled intervention to address the above impairments. Continue treatment per established plan of care. Discharge Recommendations:  Rehab  Further Equipment Recommendations for Discharge:  none       SUBJECTIVE:   Patient stated I guess i could get up.       OBJECTIVE DATA SUMMARY:   Critical Behavior:  Neurologic State: Alert, Confused  Orientation Level: Oriented to person, Disoriented to situation, Disoriented to place, Disoriented to time  Cognition: Appropriate for age attention/concentration, Follows commands     Functional Mobility Training:  Bed Mobility:     Supine to Sit: Minimum assistance     Scooting: Minimum assistance        Transfers:  Sit to Stand: Minimum assistance  Stand to Sit: Minimum assistance                             Balance:  Sitting: Intact  Standing - Static: Constant support  Standing - Dynamic : Fair  Ambulation/Gait Training:  Distance (ft): 40 Feet (ft)  Assistive Device: Walker, rolling;Gait belt  Ambulation - Level of Assistance: Minimal assistance        Gait Abnormalities: Path deviations        Base of Support: Widened                                        Stairs:                       Neuro Re-Education:     Therapeutic Exercises:   BLE thera ex in all planes while sitting in chair  Pain:  Pain Scale 1: Numeric (0 - 10)  Pain Intensity 1: 0              Activity Tolerance:   Fair  Please refer to the flowsheet for vital signs taken during this treatment.   After treatment:   [X]    Patient left in no apparent distress sitting up in chair  [ ]    Patient left in no apparent distress in bed  [X]    Call bell left within reach  [X]    Nursing notified  [ ]    Caregiver present  [X]    Chair alarm activated      COMMUNICATION/COLLABORATION:   The patients plan of care was discussed with: Registered Nurse     Modesta Hammans   Time Calculation: 30 mins

## 2017-04-20 NOTE — PROGRESS NOTES
4/20/2017 11:03 AM [facility] has accepted pt as long as pt tolerates advanced diet for 24 hours. Earliest [facility] can accept will be tomorrow. Attending notified. CM will follow up.       4/20/2017  10:44 AM Spoke with [facility] liaison, referral is pending. CM will follow up.     4/20/2017 7:38 AM Called and lvm with [facility] liaisons regarding referral. Notified potential discharge today. CM will follow up.  RADHA Crawford

## 2017-04-20 NOTE — PROGRESS NOTES
[facility] Dosing Services: 4/20/17    Consult for Warfarin Dosing by Pharmacy by Dr. Porter Peralta provided for this 80 y.o.  female , for indication of Atrial Fibrillation. Day of Therapy Continued from home. Held for supratherpeutic INR on admission. Previous home dose was Warfarin 5mg on Monday, Wednesday, and Friday and 2.5mg Tuesday, Thursday, Saturday, Sunday. Dose to achieve an INR goal of 2-3    Order entered for  Warfarin  2.5 (mg) ordered to be given today at 18:00. Significant drug interactions: Levofloxacin, Flagyl  Previous dose given Held with Vit K 2.5mg given 4/16 and 4/17   PT/INR Lab Results   Component Value Date/Time    INR 2.9 04/20/2017 04:21 AM      Platelets Lab Results   Component Value Date/Time    PLATELET 421 20/14/9367 04:21 AM      H/H Lab Results   Component Value Date/Time    HGB 9.1 04/20/2017 04:21 AM        Pharmacy to follow daily and will provide subsequent Warfarin dosing based on clinical status.     Mary Richardson, PharmD, BCPS  Contact information

## 2017-04-20 NOTE — PROGRESS NOTES
Stevan Fang [facility] [address]  [address], [place], [address] [facility]  [phone]      Medical Progress Note      NAME:         Dayan Hurst   :        1924  MRM:        [id number]    Date:          2017      Subjective: Patient has been seen and examined as a follow up for acute enteritis. Chart, labs, diagnostics reviewed. She is tolerating her diet well with minimal residual aching abdominal discomfort. No fever or chills. No nausea or vomiting. Objective:    Vital Signs:    Visit Vitals    /64 (BP 1 Location: Left arm, BP Patient Position: At rest)    Pulse 90    Temp 97.6 °F (36.4 °C)    Resp 18    Ht 5' 3\" (1.6 m)    Wt 85.2 kg (187 lb 13.3 oz)    SpO2 97%    Breastfeeding No    BMI 33.27 kg/m2          Intake/Output Summary (Last 24 hours) at 17 1240  Last data filed at 17 1008   Gross per 24 hour   Intake                0 ml   Output                1 ml   Net               -1 ml      Physical Examination:    General:   Weak looking elderly female patient, not in any acute distress   Eyes:   pink conjunctivae, PERRLA with no discharge. ENT:   no ottorrhea or rhinorrhea with dry mucous membranes  Pulm:  clear breath sounds without crackles or wheezes  Card:   has regular and normal S1, S2 without thrills, bruits or peripheral edema  Abd:  Soft, non tender, non-distended, normoactive bowel sounds   Musc:  No cyanosis, clubbing, atrophy or deformities. Skin:  No rashes. Upper extremity bruising. No ulcers. Neuro: Awake and alert.  Generally a non focal exam.   Psych:  Has a good insight and is oriented x 3    Current Facility-Administered Medications   Medication Dose Route Frequency    [START ON 2017] ferrous sulfate tablet 325 mg  1 Tab Oral DAILY WITH BREAKFAST    metroNIDAZOLE (FLAGYL) tablet 500 mg  500 mg Oral Q6H    levoFLOXacin (LEVAQUIN) tablet 750 mg  750 mg Oral Q48H    bisacodyl (DULCOLAX) suppository 10 mg  10 mg Rectal DAILY    0.9% sodium chloride infusion  100 mL/hr IntraVENous CONTINUOUS    sodium chloride (NS) flush 5-10 mL  5-10 mL IntraVENous Q8H    sodium chloride (NS) flush 5-10 mL  5-10 mL IntraVENous PRN    acetaminophen (TYLENOL) tablet 650 mg  650 mg Oral Q4H PRN    oxyCODONE-acetaminophen (PERCOCET) 5-325 mg per tablet 1 Tab  1 Tab Oral Q4H PRN    HYDROmorphone (PF) (DILAUDID) injection 0.5 mg  0.5 mg IntraVENous Q4H PRN    insulin lispro (HUMALOG) injection   SubCUTAneous AC&HS    glucose chewable tablet 16 g  4 Tab Oral PRN    dextrose (D50W) injection syrg 12.5-25 g  12.5-25 g IntraVENous PRN    glucagon (GLUCAGEN) injection 1 mg  1 mg IntraMUSCular PRN    febuxostat (ULORIC) tablet 40 mg  40 mg Oral DAILY    metoprolol succinate (TOPROL-XL) XL tablet 12.5 mg  12.5 mg Oral DAILY    pravastatin (PRAVACHOL) tablet 40 mg  40 mg Oral QHS    pantoprazole (PROTONIX) tablet 40 mg  40 mg Oral DAILY        Laboratory data and review:    Recent Labs      04/20/17   0421 04/19/17   0511  04/18/17 0421   WBC  9.8  9.1  11.6*   HGB  9.1*  8.9*  9.0*   HCT  28.3*  27.5*  27.9*   PLT  269  243  225     Recent Labs      04/20/17   0421 04/19/17   0511  04/18/17   0744  04/18/17 0421   NA   --   146*   --   144   K   --   4.0   --   4.0   CL   --   111*   --   110*   CO2   --   21   --   22   GLU   --   79   --   112*   BUN   --   35*   --   42*   CREA   --   1.69*   --   1.90*   CA   --   7.9*   --   7.8*   INR  2.9*   --   3.8*   --      No components found for: [place]    Other Diagnostics:    CT scan abdomen and pelvis - Long segment area of small bowel thickening in the mid small bowel with  adjacent mesenteric edema and small amount of free fluid. Findings are nonspecific but likely infectious or inflammatory. Ischemia is not excluded.  No drainable fluid collection    Telemetry reviewed:   normal sinus rhythm    Assessment and Plan:    Enteritis (4/16/2017)/ SIRS (systemic inflammatory response syndrome) ([facility]) (4/16/2017)/ Elevated lactic acid level (4/16/2017) POA: acute involving small bowel. Unclear if infectious, inflammatory vs ischemic. Seen by general surgery and GI. No further testing planned. Start a diabetic diet. Discontinue IV Zosyn and start PO Levofloxacin and metronidazole. Mobilize. If well tolerated, SAH in AM       Chronic anticoagulation (4/16/2017)/Coagulopathy ([facility] (4/16/2017): due to Warfarin. Sp vit K. INR therapeutic and no observable bleeding. Hgb stable. ARF (acute renal failure) ([facility]) (4/16/2017)/ CKD (chronic kidney disease) stage 3, GFR 30-59 ml/min (4/16/2017): Cr continues to improve. DC fluids. Watch renal function    HTN (hypertension) (10/8/2015): BP well controlled. 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 Start a diabetic diet. SSI per protocol    High cholesterol (10/8/2015): continue Simvastatin    STEPHANI (obstructive sleep apnea) (4/16/2017): has declined to use CPAP    A-fib ([facility] (4/16/2017): chronic. Rate controlled. Continue metoprolol. Pharmacy to dose coumadin. Pyuria (4/17/2017): Urine Cx isolating Klebsiella pneumoniae.  Continue levaquin     Code status: patient is FULL CODE    Total time spent for the patient's care: [address] discussed with: Patient, Family and Nursing Staff    Discussed:  Care Plan and D/C Planning    Prophylaxis:  H2B/PPI    Anticipated Disposition:  Rehab           ___________________________________________________    Attending Physician:   Hernán Crockett MD

## 2017-04-20 NOTE — PROGRESS NOTES
[facility] Dosing Services: Antimicrobial Stewardship Progress Note  Levaquin dose change per renal P&T protocol  Physician: Dr Rebeka Vogel  Indication: Intra-abdominal infection  Day of Therapy: 1  Zosyn dcd    Plan:  Non-Kinetic Antimicrobial Dosing:   Current Regimen:  Levaquin 750 mg po daily  Recommendation: Changed Levaquin to 750 mg po q48hr  Other Antimicrobial  (not dosed by pharmacist)   Flagyl 500 mg po q6hr   Cultures     4/16/2017: Urine: Klebsiella sens to Levaquin final   Serum Creatinine     Lab Results   Component Value Date/Time    Creatinine 1.69 04/19/2017 05:11 AM       Creatinine Clearance Estimated Creatinine Clearance: 21.5 mL/min (based on Cr of 1.69).      Temp   97.6 °F (36.4 °C)    WBC   Lab Results   Component Value Date/Time    WBC 9.8 04/20/2017 04:21 AM       H/H   Lab Results   Component Value Date/Time    HGB 9.1 04/20/2017 04:21 AM        Platelets   Lab Results   Component Value Date/Time    PLATELET 460 49/08/0333 04:21 AM        Pharmacist: Signed Corwin Hummel Contact information: [phone]

## 2017-04-20 NOTE — PROGRESS NOTES
Bedside and Verbal shift change report given to Mike Figueroa RN (oncoming nurse) by Sarkis Segovia RN (offgoing nurse). Report included the following information SBAR, Kardex, ED Summary, Intake/Output, MAR, Accordion and Recent Results.

## 2017-04-20 NOTE — PROGRESS NOTES
MD notified of patient confusion overnight as reported by night RN and continuing into this morning. Patient oriented to self. Disoriented to time, situation, and place. Remains calm. Bed alarm is on.    1732 Informed Dr. Dionisio Stearns of continuing confusion and concern of family member.  New order for stat head CT and coumadin 2.5 mg.

## 2017-04-20 NOTE — PROGRESS NOTES
General Surgery Daily Progress Note    Patient: Barak Mulligan MRN: [id number]  SSN: [id number]    YOB: 1924  Age: 80 y.o. Sex: female      Admit Date: 4/16/2017    Subjective:   Patient denies pain, reports BM but minimal flatus.  Tolerating PO    Current Facility-Administered Medications   Medication Dose Route Frequency    bisacodyl (DULCOLAX) suppository 10 mg  10 mg Rectal DAILY    0.9% sodium chloride infusion  100 mL/hr IntraVENous CONTINUOUS    sodium chloride (NS) flush 5-10 mL  5-10 mL IntraVENous Q8H    sodium chloride (NS) flush 5-10 mL  5-10 mL IntraVENous PRN    acetaminophen (TYLENOL) tablet 650 mg  650 mg Oral Q4H PRN    oxyCODONE-acetaminophen (PERCOCET) 5-325 mg per tablet 1 Tab  1 Tab Oral Q4H PRN    HYDROmorphone (PF) (DILAUDID) injection 0.5 mg  0.5 mg IntraVENous Q4H PRN    insulin lispro (HUMALOG) injection   SubCUTAneous AC&HS    glucose chewable tablet 16 g  4 Tab Oral PRN    dextrose (D50W) injection syrg 12.5-25 g  12.5-25 g IntraVENous PRN    glucagon (GLUCAGEN) injection 1 mg  1 mg IntraMUSCular PRN    febuxostat (ULORIC) tablet 40 mg  40 mg Oral DAILY    metoprolol succinate (TOPROL-XL) XL tablet 12.5 mg  12.5 mg Oral DAILY    pravastatin (PRAVACHOL) tablet 40 mg  40 mg Oral QHS    piperacillin-tazobactam (ZOSYN) 3.375 g in 0.9% sodium chloride (MBP/ADV) 100 mL  3.375 g IntraVENous Q12H    pantoprazole (PROTONIX) tablet 40 mg  40 mg Oral DAILY        Objective:         Patient Vitals for the past 8 hrs:   BP Temp Pulse Resp SpO2   04/20/17 0825 139/61 98 °F (36.7 °C) 98 18 93 %   04/20/17 0700 - - 90 - -   04/20/17 0401 148/65 97.8 °F (36.6 °C) 91 18 94 %       Physical Exam:  General: Alert, cooperative, NAD  Lungs: Unlabored  Heart:  Regular rate and rhythm  Abdomen: Soft, nontender, nondistended  Extremities: Warm, moves all, no edema  Skin:  Warm and dry, no rash    Labs:   Recent Labs      04/20/17   0421   WBC  9.8   HGB  9.1*   HCT  28.3*   PLT 269     Recent Labs      04/19/17   0511   NA  146*   K  4.0   CL  111*   CO2  21   GLU  79   BUN  35*   CREA  1.69*   CA  7.9*       Assessment / Plan:   · Enteritis   · Improved bowel function  · Abdomen nontender  · Tolerating PO - ADAT  · No surgical issue

## 2017-04-20 NOTE — PROGRESS NOTES
Gastroenterology Progress Note    April 20, 2017  Admit Date: 4/16/2017         Narrative Assessment and Plan   · Acute enteritis  · Anemia - Hgb stable 9.1  · Positive hemoccult    Plan  - recommend continue advancing diet  - Regarding positive hemoccult: discussed result with patient, there is no sign of active bleeding. CT scan did not show any evidence of large mass. Patient prefers not to have any type of procedure that requires anesthesia. Would consider if signs of active bleeding.   - start PO iron with plan to repeat CBC in about 3-4 weeks  - stay on daily PPI also. Subjective:   · Patient up in chair. Has no complaints at this time. Had a good BM this AM and states she feels better. Denies melena or hematochezia. Tolerating full liquids. No nausea or vomiting. ROS:  The previous review of systems on initial consultation / H&P is noted and reviewed. Specific changes noted above in HPI.     Current Medications:     Current Facility-Administered Medications   Medication Dose Route Frequency    bisacodyl (DULCOLAX) suppository 10 mg  10 mg Rectal DAILY    0.9% sodium chloride infusion  100 mL/hr IntraVENous CONTINUOUS    sodium chloride (NS) flush 5-10 mL  5-10 mL IntraVENous Q8H    sodium chloride (NS) flush 5-10 mL  5-10 mL IntraVENous PRN    acetaminophen (TYLENOL) tablet 650 mg  650 mg Oral Q4H PRN    oxyCODONE-acetaminophen (PERCOCET) 5-325 mg per tablet 1 Tab  1 Tab Oral Q4H PRN    HYDROmorphone (PF) (DILAUDID) injection 0.5 mg  0.5 mg IntraVENous Q4H PRN    insulin lispro (HUMALOG) injection   SubCUTAneous AC&HS    glucose chewable tablet 16 g  4 Tab Oral PRN    dextrose (D50W) injection syrg 12.5-25 g  12.5-25 g IntraVENous PRN    glucagon (GLUCAGEN) injection 1 mg  1 mg IntraMUSCular PRN    febuxostat (ULORIC) tablet 40 mg  40 mg Oral DAILY    metoprolol succinate (TOPROL-XL) XL tablet 12.5 mg  12.5 mg Oral DAILY    pravastatin (PRAVACHOL) tablet 40 mg  40 mg Oral QHS    piperacillin-tazobactam (ZOSYN) 3.375 g in 0.9% sodium chloride (MBP/ADV) 100 mL  3.375 g IntraVENous Q12H    pantoprazole (PROTONIX) tablet 40 mg  40 mg Oral DAILY       Objective:     VITALS:   Last 24hrs VS reviewed since prior progress note. Most recent are:  Visit Vitals    /61 (BP 1 Location: Left arm, BP Patient Position: At rest)    Pulse 98    Temp 98 °F (36.7 °C)    Resp 18    Ht 5' 3\" (1.6 m)    Wt 85.2 kg (187 lb 13.3 oz)    SpO2 93%    Breastfeeding No    BMI 33.27 kg/m2     Temp (24hrs), Av.8 °F (36.6 °C), Min:97.7 °F (36.5 °C), Max:98 °F (36.7 °C)    No intake or output data in the 24 hours ending 17 1004    EXAM:  General: Comfortable, no distress    HEENT: Atraumatic skull, pupils equal  Lungs:  Non respiratory distress. Speaking in complete sentences  Abdomen: Soft, nondistended, nontender. No mass, guarding or rebound  Musc:  No skeletal defects or deformities  Neurologic:  Cranial nerves grossly intact, moves all 4 extremities  Psych:   Good insight. Not anxious nor agitated    Lab Data Reviewed:   Recent Labs      17   04217   0511  17   0421   WBC  9.8  9.1  11.6*   HGB  9.1*  8.9*  9.0*   HCT  28.3*  27.5*  27.9*   PLT  269  243  225     Recent Labs      17   0421  17   0511  17   0744  17   0421   NA   --   146*   --   144   K   --   4.0   --   4.0   CL   --   111*   --   110*   CO2   --   21   --   22   GLU   --   79   --   112*   BUN   --   35*   --   42*   CREA   --   1.69*   --   1.90*   CA   --   7.9*   --   7.8*   INR  2.9*   --   3.8*   --      Lab Results   Component Value Date/Time    Glucose (POC) 119 2017 07:26 AM    Glucose (POC) 137 2017 10:08 PM    Glucose (POC) 135 2017 04:15 PM    Glucose (POC) 174 2017 11:44 AM    Glucose (POC) 127 2017 08:15 AM     No results for input(s): PH, PCO2, PO2, HCO3, FIO2 in the last 72 hours.   Recent Labs      17   0421  17   0017 INR  2.9*  3.8*           Assessment:   (See above)  Principal Problem:    Enteritis (4/16/2017)    Active Problems:    HTN (hypertension) (10/8/2015)      DM (diabetes mellitus) ([address] (10/8/2015)      High cholesterol (10/8/2015)      ARF (acute renal failure) ([address] (4/16/2017)      CKD (chronic kidney disease) stage 3, GFR 30-59 ml/min (4/16/2017)      SIRS (systemic inflammatory response syndrome) (HCC) (4/16/2017)      Elevated lactic acid level (4/16/2017)      STEPHANI (obstructive sleep apnea) (4/16/2017)      A-fib ([address] (4/16/2017)      Chronic anticoagulation (4/16/2017)      Coagulopathy ([address] (4/16/2017)      Pyuria (4/17/2017)        Plan:   (See above)    Signed By:  Amanda Morelos PA-C  4/20/2017  10:17 AM

## 2017-04-20 NOTE — ROUTINE PROCESS
Bedside and Verbal shift change report given to Ruthie Mijares RN (oncoming nurse) by Ezequiel Blank RN (offgoing nurse). Report included the following information SBAR, Kardex, Intake/Output, MAR, Recent Results and Cardiac Rhythm NSR.

## 2017-04-20 NOTE — ROUTINE PROCESS
Verbal shift change report given to Myranda Hernández RN (oncoming nurse) by Yuridia Crook RN (offgoing nurse). Report included the following information SBAR, Kardex, ED Summary, Intake/Output, MAR and Cardiac Rhythm NSR/BBB.

## 2017-04-21 ENCOUNTER — APPOINTMENT (OUTPATIENT)
Dept: GENERAL RADIOLOGY | Age: 82
DRG: 391 | End: 2017-04-21
Attending: INTERNAL MEDICINE
Payer: MEDICARE

## 2017-04-21 LAB
ANION GAP BLD CALC-SCNC: 10 MMOL/L (ref 5–15)
ATRIAL RATE: 96 BPM
BUN SERPL-MCNC: 14 MG/DL (ref 6–20)
BUN/CREAT SERPL: 10 (ref 12–20)
CALCIUM SERPL-MCNC: 8.3 MG/DL (ref 8.5–10.1)
CALCULATED R AXIS, ECG10: -40 DEGREES
CALCULATED T AXIS, ECG11: 47 DEGREES
CHLORIDE SERPL-SCNC: 110 MMOL/L (ref 97–108)
CO2 SERPL-SCNC: 24 MMOL/L (ref 21–32)
CREAT SERPL-MCNC: 1.35 MG/DL (ref 0.55–1.02)
DIAGNOSIS, 93000: NORMAL
GLUCOSE BLD STRIP.AUTO-MCNC: 130 MG/DL (ref 65–100)
GLUCOSE BLD STRIP.AUTO-MCNC: 135 MG/DL (ref 65–100)
GLUCOSE BLD STRIP.AUTO-MCNC: 143 MG/DL (ref 65–100)
GLUCOSE BLD STRIP.AUTO-MCNC: 149 MG/DL (ref 65–100)
GLUCOSE SERPL-MCNC: 148 MG/DL (ref 65–100)
INR PPP: 2.8 (ref 0.9–1.1)
POTASSIUM SERPL-SCNC: 3.9 MMOL/L (ref 3.5–5.1)
PROTHROMBIN TIME: 28.8 SEC (ref 9–11.1)
Q-T INTERVAL, ECG07: 340 MS
QRS DURATION, ECG06: 92 MS
QTC CALCULATION (BEZET), ECG08: 447 MS
SERVICE CMNT-IMP: ABNORMAL
SODIUM SERPL-SCNC: 144 MMOL/L (ref 136–145)
VENTRICULAR RATE, ECG03: 104 BPM

## 2017-04-21 PROCEDURE — 71010 XR CHEST PORT: CPT

## 2017-04-21 PROCEDURE — 36415 COLL VENOUS BLD VENIPUNCTURE: CPT | Performed by: INTERNAL MEDICINE

## 2017-04-21 PROCEDURE — 80048 BASIC METABOLIC PNL TOTAL CA: CPT | Performed by: INTERNAL MEDICINE

## 2017-04-21 PROCEDURE — 74011000250 HC RX REV CODE- 250: Performed by: INTERNAL MEDICINE

## 2017-04-21 PROCEDURE — 74011250637 HC RX REV CODE- 250/637: Performed by: INTERNAL MEDICINE

## 2017-04-21 PROCEDURE — 65660000000 HC RM CCU STEPDOWN

## 2017-04-21 PROCEDURE — 82962 GLUCOSE BLOOD TEST: CPT

## 2017-04-21 PROCEDURE — 85610 PROTHROMBIN TIME: CPT | Performed by: INTERNAL MEDICINE

## 2017-04-21 PROCEDURE — 93005 ELECTROCARDIOGRAM TRACING: CPT

## 2017-04-21 PROCEDURE — 74011250637 HC RX REV CODE- 250/637: Performed by: PHYSICIAN ASSISTANT

## 2017-04-21 RX ORDER — WARFARIN SODIUM 5 MG/1
2.5 TABLET ORAL ONCE
Status: COMPLETED | OUTPATIENT
Start: 2017-04-21 | End: 2017-04-21

## 2017-04-21 RX ORDER — METOPROLOL SUCCINATE 25 MG/1
25 TABLET, EXTENDED RELEASE ORAL DAILY
Status: DISCONTINUED | OUTPATIENT
Start: 2017-04-21 | End: 2017-04-22

## 2017-04-21 RX ORDER — ERYTHROMYCIN 5 MG/G
OINTMENT OPHTHALMIC EVERY 8 HOURS
Status: DISCONTINUED | OUTPATIENT
Start: 2017-04-21 | End: 2017-04-24

## 2017-04-21 RX ORDER — BUMETANIDE 0.25 MG/ML
1 INJECTION INTRAMUSCULAR; INTRAVENOUS ONCE
Status: COMPLETED | OUTPATIENT
Start: 2017-04-21 | End: 2017-04-21

## 2017-04-21 RX ORDER — FACIAL-BODY WIPES
10 EACH TOPICAL DAILY PRN
Status: DISCONTINUED | OUTPATIENT
Start: 2017-04-21 | End: 2017-04-26 | Stop reason: HOSPADM

## 2017-04-21 RX ADMIN — Medication 325 MG: at 09:07

## 2017-04-21 RX ADMIN — ERYTHROMYCIN: 5 OINTMENT OPHTHALMIC at 13:27

## 2017-04-21 RX ADMIN — METRONIDAZOLE 500 MG: 250 TABLET, FILM COATED ORAL at 17:07

## 2017-04-21 RX ADMIN — METOPROLOL SUCCINATE 25 MG: 25 TABLET, FILM COATED, EXTENDED RELEASE ORAL at 09:07

## 2017-04-21 RX ADMIN — ERYTHROMYCIN: 5 OINTMENT OPHTHALMIC at 09:07

## 2017-04-21 RX ADMIN — METRONIDAZOLE 500 MG: 250 TABLET, FILM COATED ORAL at 11:57

## 2017-04-21 RX ADMIN — Medication 10 ML: at 22:34

## 2017-04-21 RX ADMIN — ERYTHROMYCIN: 5 OINTMENT OPHTHALMIC at 22:34

## 2017-04-21 RX ADMIN — METRONIDAZOLE 500 MG: 250 TABLET, FILM COATED ORAL at 05:29

## 2017-04-21 RX ADMIN — FEBUXOSTAT 40 MG: 40 TABLET ORAL at 09:07

## 2017-04-21 RX ADMIN — PANTOPRAZOLE SODIUM 40 MG: 40 TABLET, DELAYED RELEASE ORAL at 09:07

## 2017-04-21 RX ADMIN — PRAVASTATIN SODIUM 40 MG: 20 TABLET ORAL at 22:34

## 2017-04-21 RX ADMIN — WARFARIN SODIUM 2.5 MG: 5 TABLET ORAL at 17:07

## 2017-04-21 RX ADMIN — OXYCODONE HYDROCHLORIDE AND ACETAMINOPHEN 1 TABLET: 5; 325 TABLET ORAL at 05:29

## 2017-04-21 RX ADMIN — METRONIDAZOLE 500 MG: 250 TABLET, FILM COATED ORAL at 00:13

## 2017-04-21 RX ADMIN — BUMETANIDE 1 MG: 0.25 INJECTION INTRAMUSCULAR; INTRAVENOUS at 09:07

## 2017-04-21 NOTE — PROGRESS NOTES
General Surgery    Bowel function continues and patient is tolerated diet  Abdominal pain resolved  Will sign off, please call again as needed.

## 2017-04-21 NOTE — PROGRESS NOTES
4/21/2017 10:59 AM Planning for discharge to [address] [place] [address] on 4/22 if medically stable. If pt is medically stable for discharge over the weekend, nursing please call report to [phone]. Thanks.  Catalino Hicks, BSW

## 2017-04-21 NOTE — PROGRESS NOTES
Primary nurse walked patient to bathroom. Patient very SOB with wheezing. Lung sounds coarse with expiratory wheezing. O2 sats checked on RA: 95 %, HR: 120's. Notified Dr. Law Carvalho.

## 2017-04-21 NOTE — PROGRESS NOTES
Bedside and Verbal shift change report given to BHARGAV Romero (oncoming nurse) by [address] [place] [address] Road (offgoing nurse). Report included the following information SBAR, Kardex, Intake/Output, MAR and Recent Results.

## 2017-04-21 NOTE — PROGRESS NOTES
Stevan Fang [facility] [address]  [address] [facility], [id number] [place] [address]  [phone]      Medical Progress Note      NAME:         Kriss Riddle   :        1924  MRM:        [id number]    Date:          2017      Subjective: Patient has been seen and examined as a follow up for a persistent confusion. Chart, labs, diagnostics reviewed. Her night nurse says she had episodic confusion last night. No cough, fever or chills. No abdominal discomfort. Has A fib with a slightly RVR but no chest pain or SOB. Objective:    Vital Signs:    Visit Vitals    /77 (BP 1 Location: Right arm, BP Patient Position: At rest)    Pulse (!) 113    Temp 97.8 °F (36.6 °C)    Resp 18    Ht 5' 3\" (1.6 m)    Wt 85.2 kg (187 lb 13.3 oz)    SpO2 95%    Breastfeeding No    BMI 33.27 kg/m2        Intake/Output Summary (Last 24 hours) at 17 0730  Last data filed at 17 1008   Gross per 24 hour   Intake                0 ml   Output                1 ml   Net               -1 ml        Physical Examination:    General:   Weak looking although not in any acute distress  Eyes:   erythematous  Conjunctivae with some discharge. ENT:   no ottorrhea or rhinorrhea with moist mucous membranes  Neck: no masses, thyroid non-tender and trachea central.  Pulm:  no accessory muscle use, clear breath sounds without crackles or wheezes  Card:  no JVD or murmurs, has atrial fibrillation, without thrills, bruits or peripheral edema  Abd:  Soft, non-tender, non-distended, normoactive bowel sounds with no palpable organomegaly  Musc:  No cyanosis, clubbing, atrophy or deformities. Skin:  No rashes, bruising or ulcers. Neuro: Awake and alert but seems disoriented. Speech is clear. Moves all extremities.    Psych:  Has little insight to illness     Current Facility-Administered Medications   Medication Dose Route Frequency    bumetanide (BUMEX) injection 1 mg 1 mg IntraVENous ONCE    metoprolol succinate (TOPROL-XL) XL tablet 25 mg  25 mg Oral DAILY    erythromycin (ILOTYCIN) 5 mg/gram (0.5 %) ophthalmic ointment   Both Eyes Q8H    ferrous sulfate tablet 325 mg  1 Tab Oral DAILY WITH BREAKFAST    metroNIDAZOLE (FLAGYL) tablet 500 mg  500 mg Oral Q6H    levoFLOXacin (LEVAQUIN) tablet 750 mg  750 mg Oral Q48H    Warfarin - Pharmacy to dose   Other Rx Dosing/Monitoring    bisacodyl (DULCOLAX) suppository 10 mg  10 mg Rectal DAILY    sodium chloride (NS) flush 5-10 mL  5-10 mL IntraVENous PRN    acetaminophen (TYLENOL) tablet 650 mg  650 mg Oral Q4H PRN    insulin lispro (HUMALOG) injection   SubCUTAneous AC&HS    glucose chewable tablet 16 g  4 Tab Oral PRN    dextrose (D50W) injection syrg 12.5-25 g  12.5-25 g IntraVENous PRN    glucagon (GLUCAGEN) injection 1 mg  1 mg IntraMUSCular PRN    febuxostat (ULORIC) tablet 40 mg  40 mg Oral DAILY    pravastatin (PRAVACHOL) tablet 40 mg  40 mg Oral QHS    pantoprazole (PROTONIX) tablet 40 mg  40 mg Oral DAILY        Laboratory data and review:    Recent Labs      04/20/17 0421 04/19/17   0511   WBC  9.8  9.1   HGB  9.1*  8.9*   HCT  28.3*  27.5*   PLT  269  243     Recent Labs      04/21/17 0423  04/20/17   0421  04/19/17   0511  04/18/17   0744   NA   --    --   146*   --    K   --    --   4.0   --    CL   --    --   111*   --    CO2   --    --   21   --    GLU   --    --   79   --    BUN   --    --   35*   --    CREA   --    --   1.69*   --    CA   --    --   7.9*   --    INR  2.8*  2.9*   --   3.8*     No components found for: [place]     CT scan abdomen and pelvis - Long segment area of small bowel thickening in the mid small bowel with  adjacent mesenteric edema and small amount of free fluid. Findings are nonspecific but likely infectious or inflammatory. Ischemia is not excluded.  No drainable fluid collection     12 lead EKG - atrial fibrillation with RVR      Assessment and Plan:     Acute confusion: this is new. No apparent trigger. She remains afebrile. Head CT scan done 4/20 was unremarkable. I have discontinued all opioids. She is calm this morning. Will clinically monitor for stability prior to discharge to [facility]    Acute conjuctivitis: mild but worsening. No visual changes. Start erythromycin ointment and monitor    A-fib ([facility] (4/16/2017): chronic. Now with RVR. Increase Metoprolol. Pharmacy to dose coumadin. Enteritis (4/16/2017)/ SIRS (systemic inflammatory response syndrome) ([facility]) (4/16/2017)/ Elevated lactic acid level (4/16/2017) POA: acute involving small bowel. Unclear if infectious, inflammatory vs ischemic but this is progressing well with minimal symptoms. Seen by general surgery and GI. No further testing planned. Advance diet as tolerated. Continue PO Levofloxacin and metronidazole (today is day 4). Continue to mobilize.       Chronic anticoagulation (4/16/2017)/Coagulopathy ([facility] (4/16/2017): due to Warfarin. Sp vit K. INR therapeutic and no observable bleeding. INR now much stable. Pharmacy dosing coumadin      ARF (acute renal failure) ([facility]) (4/16/2017)/ CKD (chronic kidney disease) stage 3, GFR 30-59 ml/min (4/16/2017): Cr continues to improve. Monitor renal function.      HTN (hypertension) (10/8/2015): BP slightly higher. 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 Continue  diabetic diet. SSI per protocol     High cholesterol (10/8/2015): continue Simvastatin     STEPHANI (obstructive sleep apnea) (4/16/2017): has declined to use CPAP     UTI (4/17/2017): Urine Cx isolating Klebsiella pneumoniae.  Continue levaquin      Code status: patient is FULL CODE    Total time spent for the patient's care: [address] discussed with: Patient, Family, Care Manager and Nursing Staff    Discussed:  Care Plan and D/C Planning    Prophylaxis:  Coumadin    Anticipated Disposition:  [facility]/Rehab once more stable           ___________________________________________________    Attending Physician:   Ritika Humphrey MD

## 2017-04-21 NOTE — PROGRESS NOTES
[facility] Pharmacy Dosing Services: 4/21/17    Consult for Warfarin Dosing by Pharmacy by Dr. Karla Alvarez provided for this 80 y.o.  female , for indication of Atrial Fibrillation. Day of Therapy : resumed - home dose 5 mg MWF, 2.5 mg TTSS - supra therapeutic on admission  Dose to achieve an INR goal of 2-3    Order entered for  Warfarin  2.5 (mg) ordered to be given today at 18:00. Significant drug interactions: levofloxacin, metronidazole, Vitamin K 2.5 mg given 4/16 and 4/17  Previous dose given 2.5 mg   PT/INR Lab Results   Component Value Date/Time    INR 2.8 04/21/2017 04:23 AM      Platelets Lab Results   Component Value Date/Time    PLATELET 106 17/69/4020 04:21 AM      H/H Lab Results   Component Value Date/Time    HGB 9.1 04/20/2017 04:21 AM        Pharmacy to follow daily and will provide subsequent Warfarin dosing based on clinical status.   Shayy Heaton [facility])  Contact information [phone]

## 2017-04-21 NOTE — PROGRESS NOTES
Bedside and Verbal shift change report given to BHARGAV oliveira (oncoming nurse) by Andrea Garcia RN (offgoing nurse). Report included the following information SBAR, Kardex, ED Summary, Intake/Output, MAR, Accordion and Recent Results.

## 2017-04-21 NOTE — PROGRESS NOTES
PHYSICAL THERAPY    1220 Chart reviewed, Spoke with RN. RN reports patient very SOB with coarse lung sounds and expiratory wheezing. Contacted MD, awaiting chest x-ray. Will attempt later this afternoon, time permitting. Teressa Morrissey, PTA

## 2017-04-21 NOTE — PROGRESS NOTES
Patient is becoming more confused and agitated. She thought she was at home and thought the voices she heard in the hallway were her family members on her front porch. She stated she was upset because she has been trying to call her daughter all night and would only get a busy signal. When asked what number she called, she called the wrong number. Even after trying to reorient patient to place, time, and situation she is still confused. Helped patient call daughter, Namita Rang, patient still upset and disoriented to place, time, and situation. Per shift report at 1900, physician made aware of patient's confusion on 4/20/17. Will continue to monitor patient.

## 2017-04-21 NOTE — PROGRESS NOTES
Gastroenterology Progress Note    April 21, 2017  Admit Date: 4/16/2017         Narrative Assessment and Plan   · Acute enteritis  · Anemia - Hgb stable 9.1  · Positive hemoccult    Plan  - no new recommendations, stable for discharge from our standpoint  - continue PO iron with plan to repeat CBC in about 3-4 weeks  - continue daily PPI  - patient to follow up with PCP for continued monitoring or our office as needed    Subjective:   · Has no complaints at this time. Tolerating regular diet. Denies N/V. Having BM. Did have some confusion last night but now back to baseline. ROS:  The previous review of systems on initial consultation / H&P is noted and reviewed. Specific changes noted above in HPI. Current Medications:     Current Facility-Administered Medications   Medication Dose Route Frequency    metoprolol succinate (TOPROL-XL) XL tablet 25 mg  25 mg Oral DAILY    erythromycin (ILOTYCIN) 5 mg/gram (0.5 %) ophthalmic ointment   Both Eyes Q8H    ferrous sulfate tablet 325 mg  1 Tab Oral DAILY WITH BREAKFAST    metroNIDAZOLE (FLAGYL) tablet 500 mg  500 mg Oral Q6H    levoFLOXacin (LEVAQUIN) tablet 750 mg  750 mg Oral Q48H    Warfarin - Pharmacy to dose   Other Rx Dosing/Monitoring    bisacodyl (DULCOLAX) suppository 10 mg  10 mg Rectal DAILY    sodium chloride (NS) flush 5-10 mL  5-10 mL IntraVENous PRN    acetaminophen (TYLENOL) tablet 650 mg  650 mg Oral Q4H PRN    insulin lispro (HUMALOG) injection   SubCUTAneous AC&HS    glucose chewable tablet 16 g  4 Tab Oral PRN    dextrose (D50W) injection syrg 12.5-25 g  12.5-25 g IntraVENous PRN    glucagon (GLUCAGEN) injection 1 mg  1 mg IntraMUSCular PRN    febuxostat (ULORIC) tablet 40 mg  40 mg Oral DAILY    pravastatin (PRAVACHOL) tablet 40 mg  40 mg Oral QHS    pantoprazole (PROTONIX) tablet 40 mg  40 mg Oral DAILY       Objective:     VITALS:   Last 24hrs VS reviewed since prior progress note.  Most recent are:  Visit Vitals    BP 130/73 (BP 1 Location: Left arm, BP Patient Position: At rest)    Pulse (!) 104    Temp 98 °F (36.7 °C)    Resp 16    Ht 5' 3\" (1.6 m)    Wt 85.2 kg (187 lb 13.3 oz)    SpO2 95%    Breastfeeding No    BMI 33.27 kg/m2     Temp (24hrs), Av °F (36.7 °C), Min:97.6 °F (36.4 °C), Max:98.3 °F (36.8 °C)      Intake/Output Summary (Last 24 hours) at 17 0950  Last data filed at 17 0946   Gross per 24 hour   Intake                0 ml   Output              526 ml   Net             -526 ml       EXAM:  General: Comfortable, no distress    Lungs:  Non respiratory distress. Speaking in complete sentences  Abdomen: Soft, nondistended, nontender. No mass, guarding or rebound  Neurologic:  Cranial nerves grossly intact, moves all 4 extremities    Lab Data Reviewed:   Recent Labs      17   0511   WBC  9.8  9.1   HGB  9.1*  8.9*   HCT  28.3*  27.5*   PLT  269  243     Recent Labs      17   0423  04/20/17   04217   0511   NA   --    --   146*   K   --    --   4.0   CL   --    --   111*   CO2   --    --   21   GLU   --    --   79   BUN   --    --   35*   CREA   --    --   1.69*   CA   --    --   7.9*   INR  2.8*  2.9*   --      Lab Results   Component Value Date/Time    Glucose (POC) 135 2017 07:40 AM    Glucose (POC) 155 2017 09:07 PM    Glucose (POC) 194 2017 05:21 PM    Glucose (POC) 146 2017 11:07 AM    Glucose (POC) 119 2017 07:26 AM     No results for input(s): PH, PCO2, PO2, HCO3, FIO2 in the last 72 hours.   Recent Labs      17   0423  04/20/17   042   INR  2.8*  2.9*           Assessment:   (See above)  Principal Problem:    Enteritis (2017)    Active Problems:    HTN (hypertension) (10/8/2015)      DM (diabetes mellitus) ([facility] (10/8/2015)      High cholesterol (10/8/2015)      ARF (acute renal failure) ([facility] (2017)      CKD (chronic kidney disease) stage 3, GFR 30-59 ml/min (2017)      SIRS (systemic inflammatory response syndrome) (HCC) (4/16/2017)      Elevated lactic acid level (4/16/2017)      STEPHANI (obstructive sleep apnea) (4/16/2017)      A-fib ([facility] (4/16/2017)      Chronic anticoagulation (4/16/2017)      Coagulopathy ([facility] (4/16/2017)      Pyuria (4/17/2017)        Plan:   (See above)    Signed By:  Amanda Morelos PA-C  4/21/2017  9:50 AM

## 2017-04-21 NOTE — DISCHARGE SUMMARY
Physician Interim Discharge Summary     Patient ID:  Xuan Corrales  [id number]  28 y.o.  4/18/1924    PCP: Fatoumata Shepard MD    Admit date: 4/16/2017    Summary date and time: 4/21/2017    Summary covers time period: 4/17 through 4/21    Admission Diagnoses:   Enteritis    Current Diagnoses:    Principal Diagnosis     Enteritis (4/16/2017)    New onset confusion    HTN (hypertension) (10/8/2015)    DM (diabetes mellitus) ([address] (10/8/2015)    High cholesterol (10/8/2015)    ARF (acute renal failure) ([address] (4/16/2017)    CKD (chronic kidney disease) stage 3, GFR 30-59 ml/min (4/16/2017)    SIRS (systemic inflammatory response syndrome) (HCC) (4/16/2017)    Elevated lactic acid level (4/16/2017)    STEPHANI (obstructive sleep apnea) (4/16/2017)    A-fib ([address] (4/16/2017)    Chronic anticoagulation (4/16/2017)    Coagulopathy ([address] (4/16/2017)    Pyuria (4/17/2017)    Hospital Course (by problem or diagnosis):     Acute confusion: this is new. No apparent trigger other than current infection. She remains afebrile. Head CT scan done 4/20 was unremarkable. I have discontinued all opioids. Not sure she has underlying dementia. Accepted for rehab but watching closely prior to transfer.      Acute conjuctivitis: mild but worsening. No visual changes. Started erythromycin ointment     A-fib ([address] (4/16/2017): chronic. Now with variable RVR. Increased Metoprolol. Pharmacy to dose coumadin.      Enteritis (4/16/2017)/ SIRS (systemic inflammatory response syndrome) (HCC) (4/16/2017)/ Elevated lactic acid level (4/16/2017) POA: acute involving small bowel. Unclear if infectious, inflammatory vs ischemic but this is progressing well with minimal symptoms. Seen by general surgery and GI. No further testing planned. Advance diet as tolerated. Continue PO Levofloxacin and metronidazole (today is day 4). Continue to mobilize.       Chronic anticoagulation (4/16/2017)/Coagulopathy ([address] (4/16/2017):  INR initially elevated needing vitamin K. INR therapeutic and no observable bleeding. Pharmacy dosing coumadin       ARF (acute renal failure) (HCC) (4/16/2017)/ CKD (chronic kidney disease) stage 3, GFR 30-59 ml/min (4/16/2017): Cr continues to improve. 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 Continue diabetic diet. SSI per protocol      High cholesterol (10/8/2015): continue Simvastatin      STEPHANI (obstructive sleep apnea) (4/16/2017): has declined to use CPAP      UTI (4/17/2017): Urine Cx isolating Klebsiella pneumoniae. Continue levaquin     Final summary will be prepared by discharging hospitalist at actual time of hospital discharge.     Dispo: [facility] when stable    Signed:  Eladia Barrios MD  4/21/2017  7:31 PMpHYSICI

## 2017-04-21 NOTE — PROGRESS NOTES
Nutrition Assessment:    RECOMMENDATIONS/INTERVENTION(S):   Continue CCD   Start Glucerna TID until appetite/PO improve  Monitor PO, supplement tolerance, BG, renal labs - adjust diet/ONS accordingly    ASSESSMENT:   4/21:  Pt seen for LOS. 80 yof admitted for enteritis. Pmhx includes DM, CKD stage III, high cholesterol, Afib, HTN. GI & surgery following for enteritis. No further testing. Obese per BMI per age. No recent wt loss. Eating well PTA. Pt reports poor appetite. Observed <25% po of lunch meal.  Has tried Glucerna at previous hospital stay, agreeable to accept until appetite improves. Pt reports MD concerned about low back pain/problem. MD notes plans for d/c to [facility] once medically stable. Labs/meds reviewed. -[phone]; A1C 6.6.  BUN, Cr, Na elevated. Skin: thin, @ risk for PI 2/2 poor nutrition. SUBJECTIVE/OBJECTIVE:     Diet Order: Consistent carb  % Eaten:  No data found. Pertinent Medications: [x] Reviewed - bumex, statin, insulin, protonix    Chemistries:  Lab Results   Component Value Date/Time    Sodium 146 04/19/2017 05:11 AM    Potassium 4.0 04/19/2017 05:11 AM    Chloride 111 04/19/2017 05:11 AM    CO2 21 04/19/2017 05:11 AM    Anion gap 14 04/19/2017 05:11 AM    Glucose 79 04/19/2017 05:11 AM    BUN 35 04/19/2017 05:11 AM    Creatinine 1.69 04/19/2017 05:11 AM    BUN/Creatinine ratio 21 04/19/2017 05:11 AM    GFR est AA 34 04/19/2017 05:11 AM    GFR est non-AA 28 04/19/2017 05:11 AM    Calcium 7.9 04/19/2017 05:11 AM    AST (SGOT) 20 04/16/2017 05:24 PM    Alk.  phosphatase 55 04/16/2017 05:24 PM    Protein, total 7.1 04/16/2017 05:24 PM    Albumin 3.2 04/16/2017 05:24 PM    Globulin 3.9 04/16/2017 05:24 PM    A-G Ratio 0.8 04/16/2017 05:24 PM    ALT (SGPT) 20 04/16/2017 05:24 PM      Anthropometrics: Height: 5' 3\" (160 cm) Weight: 85.2 kg (187 lb 13.3 oz)    IBW (%IBW): 57.5 kg (126 lb 12.2 oz) (148.17 %) UBW (%UBW): 78.9 kg (174 lb) (107.95 %)    BMI: Body mass index is 33.27 kg/(m^2). This BMI is indicative of:   [] Underweight    [] Normal    [] Overweight    [x]  Obesity    []  Extreme Obesity (BMI>40)  Estimated Nutrition Needs (Based on): 1593 Kcals/day (RMR (1226) x 1.3 AF ) , 76 g (-93  (0.9-1.1 g/kg x actual body wt)) Protein  Carbohydrate:  At Least 130 g/day  Fluids: 1600 mL/day    Last BM: 4/20   [x]Active     []Hyperactive  []Hypoactive       [] Absent   BS  Skin:    [] Intact   [] Incision  [] Breakdown   [] DTI   [] Tears/Excoriation/Abrasion  []Edema [x] Other:thin   Wt Readings from Last 30 Encounters:   04/21/17 85.2 kg (187 lb 13.3 oz)   05/27/16 80.6 kg (177 lb 9.6 oz)   12/09/15 78.5 kg (173 lb)   11/20/15 74.4 kg (164 lb)   10/31/15 74.8 kg (164 lb 14.4 oz)   10/16/15 76.2 kg (168 lb 1.6 oz)      NUTRITION DIAGNOSES:   Problem:  Inadequate protein-energy intake     Etiology: related to conditions associated with a dx: enteritis     Signs/Symptoms: as evidenced by reports of poor appetite and observed poor po of meals      NUTRITION INTERVENTIONS:                General, healthful diet; commercial supplement     GOAL:   Pt will tolerate/consume >25% of meals and >75% of supplements in the next 2-4 days    Cultural, Rastafarian, or Ethnic Dietary Needs:       LEARNING NEEDS (Diet, Food/Nutrient-Drug Interaction):    [x] None Identified   [] Identified and Education Provided/Documented   [] Identified and Pt declined/was not appropriate      [] Interdisciplinary Care Plan Reviewed/Documented    [x] Discharge Needs:   See D/C note   [] No Nutrition Related Discharge Needs    NUTRITION RISK:   Pt Is At Nutrition Risk  [x]     No Nutrition Risk Identified  []       PT SEEN FOR:    []  MD Consult: []Calorie Count      []Diabetic Diet Education        []Diet Education     []Electrolyte Management     []General Nutrition Management and Supplements     []Management of Tube Feeding     []TPN Recommendations    []  RN Referral:  []MST score >=2     []Enteral/Parenteral Nutrition PTA     []Pregnant: Gestational DM or Multigestation                 [] Pressure Ulcer    []  Low BMI      [x]  Length of Stay       [] Dysphagia Diet         [] Ventilator            Aly Mckeon, [address] [place] [address]   Pager [phone]

## 2017-04-22 ENCOUNTER — APPOINTMENT (OUTPATIENT)
Dept: CT IMAGING | Age: 82
DRG: 391 | End: 2017-04-22
Attending: INTERNAL MEDICINE
Payer: MEDICARE

## 2017-04-22 LAB
ANION GAP BLD CALC-SCNC: 14 MMOL/L (ref 5–15)
BUN SERPL-MCNC: 12 MG/DL (ref 6–20)
BUN/CREAT SERPL: 10 (ref 12–20)
CALCIUM SERPL-MCNC: 8.6 MG/DL (ref 8.5–10.1)
CHLORIDE SERPL-SCNC: 110 MMOL/L (ref 97–108)
CO2 SERPL-SCNC: 21 MMOL/L (ref 21–32)
CREAT SERPL-MCNC: 1.22 MG/DL (ref 0.55–1.02)
GLUCOSE BLD STRIP.AUTO-MCNC: 111 MG/DL (ref 65–100)
GLUCOSE BLD STRIP.AUTO-MCNC: 123 MG/DL (ref 65–100)
GLUCOSE BLD STRIP.AUTO-MCNC: 137 MG/DL (ref 65–100)
GLUCOSE BLD STRIP.AUTO-MCNC: 137 MG/DL (ref 65–100)
GLUCOSE SERPL-MCNC: 138 MG/DL (ref 65–100)
INR PPP: 3 (ref 0.9–1.1)
POTASSIUM SERPL-SCNC: 3.8 MMOL/L (ref 3.5–5.1)
PROTHROMBIN TIME: 31.4 SEC (ref 9–11.1)
SERVICE CMNT-IMP: ABNORMAL
SODIUM SERPL-SCNC: 145 MMOL/L (ref 136–145)

## 2017-04-22 PROCEDURE — 80048 BASIC METABOLIC PNL TOTAL CA: CPT | Performed by: INTERNAL MEDICINE

## 2017-04-22 PROCEDURE — 77010033678 HC OXYGEN DAILY

## 2017-04-22 PROCEDURE — 74011250637 HC RX REV CODE- 250/637: Performed by: INTERNAL MEDICINE

## 2017-04-22 PROCEDURE — 65660000000 HC RM CCU STEPDOWN

## 2017-04-22 PROCEDURE — 36415 COLL VENOUS BLD VENIPUNCTURE: CPT | Performed by: INTERNAL MEDICINE

## 2017-04-22 PROCEDURE — 85610 PROTHROMBIN TIME: CPT | Performed by: INTERNAL MEDICINE

## 2017-04-22 PROCEDURE — 74011250637 HC RX REV CODE- 250/637: Performed by: PHYSICIAN ASSISTANT

## 2017-04-22 PROCEDURE — 74011250636 HC RX REV CODE- 250/636: Performed by: INTERNAL MEDICINE

## 2017-04-22 PROCEDURE — 82962 GLUCOSE BLOOD TEST: CPT

## 2017-04-22 PROCEDURE — 74176 CT ABD & PELVIS W/O CONTRAST: CPT

## 2017-04-22 RX ORDER — WARFARIN 1 MG/1
1 TABLET ORAL ONCE
Status: COMPLETED | OUTPATIENT
Start: 2017-04-22 | End: 2017-04-22

## 2017-04-22 RX ORDER — AMOXICILLIN 250 MG
1 CAPSULE ORAL 2 TIMES DAILY
Status: DISCONTINUED | OUTPATIENT
Start: 2017-04-22 | End: 2017-04-26 | Stop reason: HOSPADM

## 2017-04-22 RX ORDER — FUROSEMIDE 10 MG/ML
20 INJECTION INTRAMUSCULAR; INTRAVENOUS ONCE
Status: COMPLETED | OUTPATIENT
Start: 2017-04-22 | End: 2017-04-22

## 2017-04-22 RX ORDER — METOPROLOL TARTRATE 25 MG/1
25 TABLET, FILM COATED ORAL 2 TIMES DAILY
Status: DISCONTINUED | OUTPATIENT
Start: 2017-04-22 | End: 2017-04-26 | Stop reason: HOSPADM

## 2017-04-22 RX ORDER — POLYETHYLENE GLYCOL 3350 17 G/17G
17 POWDER, FOR SOLUTION ORAL 2 TIMES DAILY
Status: DISCONTINUED | OUTPATIENT
Start: 2017-04-22 | End: 2017-04-26 | Stop reason: HOSPADM

## 2017-04-22 RX ADMIN — POLYETHYLENE GLYCOL 3350 17 G: 17 POWDER, FOR SOLUTION ORAL at 12:11

## 2017-04-22 RX ADMIN — METRONIDAZOLE 500 MG: 250 TABLET, FILM COATED ORAL at 17:31

## 2017-04-22 RX ADMIN — METRONIDAZOLE 500 MG: 250 TABLET, FILM COATED ORAL at 05:52

## 2017-04-22 RX ADMIN — Medication 325 MG: at 10:11

## 2017-04-22 RX ADMIN — POLYETHYLENE GLYCOL 3350 17 G: 17 POWDER, FOR SOLUTION ORAL at 17:32

## 2017-04-22 RX ADMIN — PRAVASTATIN SODIUM 40 MG: 20 TABLET ORAL at 21:12

## 2017-04-22 RX ADMIN — LEVOFLOXACIN 750 MG: 500 TABLET, FILM COATED ORAL at 17:30

## 2017-04-22 RX ADMIN — Medication 10 ML: at 05:53

## 2017-04-22 RX ADMIN — ACETAMINOPHEN 650 MG: 325 TABLET ORAL at 17:25

## 2017-04-22 RX ADMIN — DOCUSATE SODIUM -SENNOSIDES 1 TABLET: 50; 8.6 TABLET, COATED ORAL at 12:11

## 2017-04-22 RX ADMIN — FUROSEMIDE 20 MG: 10 INJECTION, SOLUTION INTRAMUSCULAR; INTRAVENOUS at 13:14

## 2017-04-22 RX ADMIN — METOPROLOL SUCCINATE 25 MG: 25 TABLET, FILM COATED, EXTENDED RELEASE ORAL at 06:56

## 2017-04-22 RX ADMIN — METRONIDAZOLE 500 MG: 250 TABLET, FILM COATED ORAL at 00:50

## 2017-04-22 RX ADMIN — FEBUXOSTAT 40 MG: 40 TABLET ORAL at 10:11

## 2017-04-22 RX ADMIN — ACETAMINOPHEN 650 MG: 325 TABLET ORAL at 12:19

## 2017-04-22 RX ADMIN — ACETAMINOPHEN 650 MG: 325 TABLET ORAL at 21:12

## 2017-04-22 RX ADMIN — METOPROLOL TARTRATE 25 MG: 25 TABLET ORAL at 12:10

## 2017-04-22 RX ADMIN — WARFARIN SODIUM 1 MG: 1 TABLET ORAL at 17:32

## 2017-04-22 RX ADMIN — DOCUSATE SODIUM -SENNOSIDES 1 TABLET: 50; 8.6 TABLET, COATED ORAL at 17:30

## 2017-04-22 RX ADMIN — ERYTHROMYCIN: 5 OINTMENT OPHTHALMIC at 13:14

## 2017-04-22 RX ADMIN — PANTOPRAZOLE SODIUM 40 MG: 40 TABLET, DELAYED RELEASE ORAL at 10:12

## 2017-04-22 RX ADMIN — METOPROLOL TARTRATE 25 MG: 25 TABLET ORAL at 21:12

## 2017-04-22 RX ADMIN — Medication 10 ML: at 21:17

## 2017-04-22 RX ADMIN — ERYTHROMYCIN: 5 OINTMENT OPHTHALMIC at 21:17

## 2017-04-22 RX ADMIN — METRONIDAZOLE 500 MG: 250 TABLET, FILM COATED ORAL at 12:11

## 2017-04-22 RX ADMIN — ERYTHROMYCIN: 5 OINTMENT OPHTHALMIC at 05:53

## 2017-04-22 NOTE — PROGRESS NOTES
[facility] Dosing Services: 4/22/17    Consult for Warfarin Dosing by Pharmacy by Dr. Christin Snyder provided for this 80 y.o. female , for indication of Atrial Fibrillation. Day of Therapy : resumed - home dose 5 mg MWF, 2.5 mg TTSS - supra therapeutic on admission  Dose to achieve an INR goal of 2-3    Order entered for  Warfarin  1 (mg) ordered to be given today at 18:00. Significant drug interactions: levofloxacin, metronidazole, Vitamin K 2.5 mg given 4/16 and 4/17  Previous dose given 2.5 mg   PT/INR Lab Results   Component Value Date/Time    INR 3.0 04/22/2017 05:26 AM      Platelets Lab Results   Component Value Date/Time    PLATELET 337 97/56/3612 04:21 AM      H/H Lab Results   Component Value Date/Time    HGB 9.1 04/20/2017 04:21 AM        Pharmacy to follow daily and will provide subsequent Warfarin dosing based on clinical status.   Shayy Heaton [facility])  Contact information [phone]

## 2017-04-22 NOTE — PROGRESS NOTES
Medical Progress Note      NAME: Xuan Corrales   :  1924  MRM:  [id number]    Date/Time: 2017  10:10 AM       Assessment / Plan:     Enteritis (2017): Noted on CT. Evaluated by general surgery and GI. Abdomen appears to be distended, but not tender. -- repeat CT abd today   -- Continue Levofloxacin and metronidazole, day 5      Chronic A-fib ([facility] (2017): Has been persistent tachycardic overnight. -- change to metoprolol tartrate and increase dose   -- pharmacy to dose coumadin.      Acute confusion:  May be from acute illness, hospitalization, change in environment. Head CT scan done  was unremarkable. -- avoid sedating medications   -- supportive care     Acute conjuctivitis:  Seems improved. -- continue erythromycin ointment     Chronic anticoagulation (2017)/Coagulopathy ([facility] (2017):  SP vit K. INR therapeutic currently. -- Pharmacy dosing coumadin       CKD (chronic kidney disease) stage 3, GFR 30-59 ml/min (2017):  Creatinine improved and now appears to be at baseline. -- monitor      HTN (hypertension) (10/8/2015): BP elevated at times. 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     UTI (2017): Urine Cx with Klebsiella pneumoniae. -- finish course of levaquin            Subjective:     Chief Complaint:  \"i'm looking to go home soon\"    Denies n/v. Tolerated some diet. Denies abd pain. ROS:  (bold if positive, if negative)              Objective:       Vitals:          Last 24hrs VS reviewed since prior progress note.  Most recent are:    Visit Vitals    /85 (BP 1 Location: Left arm, BP Patient Position: At rest)    Pulse (!) 112    Temp 97.7 °F (36.5 °C)    Resp 26    Ht 5' 3\" (1.6 m)    Wt 85.2 kg (187 lb 13.3 oz)    SpO2 98%    Breastfeeding No    BMI 33.27 kg/m2     SpO2 Readings from Last 6 Encounters:   04/22/17 98%   12/22/16 97%   05/27/16 95%   12/09/15 100%   11/20/15 96%   10/16/15 94%    O2 Flow Rate (L/min): 2 l/min     Intake/Output Summary (Last 24 hours) at 04/22/17 1010  Last data filed at 04/22/17 0700   Gross per 24 hour   Intake              160 ml   Output              100 ml   Net               60 ml          Exam:     Physical Exam:    Gen:  Well-developed, well-nourished, in no acute distress  HEENT:  Pink conjunctivae, hearing intact to voice, moist mucous membranes  Neck:  Supple  Resp:  No accessory muscle use, poor inspiratory effort, clear breath sounds without wheezes rales or rhonchi  Card:  No murmurs, normal S1, S2 without thrills or peripheral edema  Abd:  Soft, non-tender, distended, normoactive bowel sounds are present  Musc:  No cyanosis  Skin:  No rashes  Neuro:   Face symmetric, tongue midline, speech fluent,  strength is 5/5 bilaterally and dorsi / plantarflexion is 5/5 bilaterally, follows commands appropriately  Psych:  Poor insight, alert       Telemetry reviewed:   AFIB    Medications Reviewed: (see below)    Lab Data Reviewed: (see below)    ______________________________________________________________________    Medications:     Current Facility-Administered Medications   Medication Dose Route Frequency    metoprolol tartrate (LOPRESSOR) tablet 25 mg  25 mg Oral BID    erythromycin (ILOTYCIN) 5 mg/gram (0.5 %) ophthalmic ointment   Both Eyes Q8H    bisacodyl (DULCOLAX) suppository 10 mg  10 mg Rectal DAILY PRN    ferrous sulfate tablet 325 mg  1 Tab Oral DAILY WITH BREAKFAST    metroNIDAZOLE (FLAGYL) tablet 500 mg  500 mg Oral Q6H    levoFLOXacin (LEVAQUIN) tablet 750 mg  750 mg Oral Q48H    Warfarin - Pharmacy to dose   Other Rx Dosing/Monitoring    sodium chloride (NS) flush 5-10 mL  5-10 mL IntraVENous PRN    acetaminophen (TYLENOL) tablet 650 mg  650 mg Oral Q4H PRN    insulin lispro (HUMALOG) injection   SubCUTAneous AC&HS    glucose chewable tablet 16 g  4 Tab Oral PRN    dextrose (D50W) injection syrg 12.5-25 g  12.5-25 g IntraVENous PRN    glucagon (GLUCAGEN) injection 1 mg  1 mg IntraMUSCular PRN    febuxostat (ULORIC) tablet 40 mg  40 mg Oral DAILY    pravastatin (PRAVACHOL) tablet 40 mg  40 mg Oral QHS    pantoprazole (PROTONIX) tablet 40 mg  40 mg Oral DAILY            Lab Review:     Recent Labs      04/20/17 0421   WBC  9.8   HGB  9.1*   HCT  28.3*   PLT  269     Recent Labs      04/22/17   0526  04/21/17   1505  04/21/17   0423  04/20/17 0421   NA  145  144   --    --    K  3.8  3.9   --    --    CL  110*  110*   --    --    CO2  21  24   --    --    GLU  138*  148*   --    --    BUN  12  14   --    --    CREA  1.22*  1.35*   --    --    CA  8.6  8.3*   --    --    INR  3.0*   --   2.8*  2.9*     Lab Results   Component Value Date/Time    Glucose (POC) 123 04/22/2017 08:56 AM    Glucose (POC) 130 04/21/2017 10:32 PM    Glucose (POC) 143 04/21/2017 04:03 PM    Glucose (POC) 149 04/21/2017 11:37 AM    Glucose (POC) 135 04/21/2017 07:40 AM         Total time spent with patient: 28 [address] [facility] discussed with: Patient and Nursing Staff    Discussed:  Care Plan    Prophylaxis:  Coumadin    Disposition:  [facility]/Rehab           ___________________________________________________    Attending Physician: Maria M Jules MD

## 2017-04-22 NOTE — PROGRESS NOTES
At change of shift patient was trying to get out of bed to urinate. Assisted patient to bedside commode and she was extremely weak. 2 person assist with walker. Tele tech called and stated that her heart rate increased into the 150's. Patient was also very short of breath. BP elevated and 02 low on room air. 2 liters nasal cannula given. HOB elevated. Vital signs rechecked and within normal limits. See flowsheet. Educated patient on safety and bed rest for the night. Pure wick placed on patient for accidental incontinence. Patient is Alert and oriented x 4 and can hold a conversation. During assessment she began to talk to an imaginary person named Kalpesh Roque. Call perdomo within reach. Needs assessed. Bed alarm on.

## 2017-04-22 NOTE — PROGRESS NOTES
PHYSICAL THERAPY    1130 Chart reviewed, Spoke with RN. Pt has experienced elevated HR, 150's, with dyspnea and decreased O2sat overnight with short distance walking to bathroom. Will defer PT today and continue to follow up. Libertad Nelson

## 2017-04-22 NOTE — PROGRESS NOTES
Bedside and Verbal shift change report given to [address] (oncoming nurse) by Muriel Hanson RN (offgoing nurse). Report included the following information SBAR, Kardex, Intake/Output, MAR and Recent Results.

## 2017-04-22 NOTE — PROGRESS NOTES
Patient has been talking to an imaginary person in the room throughout the night. Vital signs stable at this time. Patient is confused and alert to self only. No apparent distress.

## 2017-04-22 NOTE — PROGRESS NOTES
Patients heart rate has been fluctuating from 100s-120s at rest the last few hours. Patient is asymptomatic and pleasantly confused lying in bed. She has been talking to Margoa Balls who are not in the room and have not been here all night. Spoke with Dr. Jamshid Reece this am regarding heart rate. Orders received to give daily dose of Metoprolol now.

## 2017-04-22 NOTE — PROGRESS NOTES
Spiritual Care Partner Volunteer visited patient in 1 on 4.21.17.   Documented by:    Dany Campbell M.Div, M.S, [place] [phone]

## 2017-04-22 NOTE — PROGRESS NOTES
Bedside report given to [facility]. Patient lying in bed. No apparent distress. Confused. Bed alarm on. Call bell within reach.

## 2017-04-23 LAB
ANION GAP BLD CALC-SCNC: 12 MMOL/L (ref 5–15)
BUN SERPL-MCNC: 16 MG/DL (ref 6–20)
BUN/CREAT SERPL: 12 (ref 12–20)
CALCIUM SERPL-MCNC: 8.6 MG/DL (ref 8.5–10.1)
CHLORIDE SERPL-SCNC: 110 MMOL/L (ref 97–108)
CO2 SERPL-SCNC: 24 MMOL/L (ref 21–32)
CREAT SERPL-MCNC: 1.29 MG/DL (ref 0.55–1.02)
ERYTHROCYTE [DISTWIDTH] IN BLOOD BY AUTOMATED COUNT: 16 % (ref 11.5–14.5)
GLUCOSE BLD STRIP.AUTO-MCNC: 100 MG/DL (ref 65–100)
GLUCOSE BLD STRIP.AUTO-MCNC: 106 MG/DL (ref 65–100)
GLUCOSE BLD STRIP.AUTO-MCNC: 111 MG/DL (ref 65–100)
GLUCOSE BLD STRIP.AUTO-MCNC: 139 MG/DL (ref 65–100)
GLUCOSE SERPL-MCNC: 121 MG/DL (ref 65–100)
HCT VFR BLD AUTO: 32.7 % (ref 35–47)
HGB BLD-MCNC: 10.4 G/DL (ref 11.5–16)
INR PPP: 3.5 (ref 0.9–1.1)
MCH RBC QN AUTO: 29.8 PG (ref 26–34)
MCHC RBC AUTO-ENTMCNC: 31.8 G/DL (ref 30–36.5)
MCV RBC AUTO: 93.7 FL (ref 80–99)
PLATELET # BLD AUTO: 316 K/UL (ref 150–400)
POTASSIUM SERPL-SCNC: 4 MMOL/L (ref 3.5–5.1)
PROTHROMBIN TIME: 37.1 SEC (ref 9–11.1)
RBC # BLD AUTO: 3.49 M/UL (ref 3.8–5.2)
SERVICE CMNT-IMP: ABNORMAL
SERVICE CMNT-IMP: NORMAL
SODIUM SERPL-SCNC: 146 MMOL/L (ref 136–145)
WBC # BLD AUTO: 12.5 K/UL (ref 3.6–11)

## 2017-04-23 PROCEDURE — 65660000000 HC RM CCU STEPDOWN

## 2017-04-23 PROCEDURE — 77010033678 HC OXYGEN DAILY

## 2017-04-23 PROCEDURE — 74011250636 HC RX REV CODE- 250/636: Performed by: INTERNAL MEDICINE

## 2017-04-23 PROCEDURE — 93306 TTE W/DOPPLER COMPLETE: CPT

## 2017-04-23 PROCEDURE — 82962 GLUCOSE BLOOD TEST: CPT

## 2017-04-23 PROCEDURE — 74011250637 HC RX REV CODE- 250/637: Performed by: INTERNAL MEDICINE

## 2017-04-23 PROCEDURE — 80048 BASIC METABOLIC PNL TOTAL CA: CPT | Performed by: INTERNAL MEDICINE

## 2017-04-23 PROCEDURE — 85610 PROTHROMBIN TIME: CPT | Performed by: INTERNAL MEDICINE

## 2017-04-23 PROCEDURE — 36415 COLL VENOUS BLD VENIPUNCTURE: CPT | Performed by: INTERNAL MEDICINE

## 2017-04-23 PROCEDURE — 74011250637 HC RX REV CODE- 250/637: Performed by: PHYSICIAN ASSISTANT

## 2017-04-23 PROCEDURE — 85027 COMPLETE CBC AUTOMATED: CPT | Performed by: INTERNAL MEDICINE

## 2017-04-23 RX ORDER — FACIAL-BODY WIPES
10 EACH TOPICAL
Status: DISPENSED | OUTPATIENT
Start: 2017-04-23 | End: 2017-04-23

## 2017-04-23 RX ORDER — FUROSEMIDE 10 MG/ML
40 INJECTION INTRAMUSCULAR; INTRAVENOUS 2 TIMES DAILY
Status: DISCONTINUED | OUTPATIENT
Start: 2017-04-23 | End: 2017-04-24

## 2017-04-23 RX ADMIN — DOCUSATE SODIUM -SENNOSIDES 1 TABLET: 50; 8.6 TABLET, COATED ORAL at 10:34

## 2017-04-23 RX ADMIN — PRAVASTATIN SODIUM 40 MG: 20 TABLET ORAL at 20:29

## 2017-04-23 RX ADMIN — ERYTHROMYCIN: 5 OINTMENT OPHTHALMIC at 15:40

## 2017-04-23 RX ADMIN — FUROSEMIDE 40 MG: 10 INJECTION, SOLUTION INTRAMUSCULAR; INTRAVENOUS at 13:00

## 2017-04-23 RX ADMIN — Medication 10 ML: at 20:30

## 2017-04-23 RX ADMIN — FEBUXOSTAT 40 MG: 40 TABLET ORAL at 10:35

## 2017-04-23 RX ADMIN — DOCUSATE SODIUM -SENNOSIDES 1 TABLET: 50; 8.6 TABLET, COATED ORAL at 18:23

## 2017-04-23 RX ADMIN — ERYTHROMYCIN: 5 OINTMENT OPHTHALMIC at 22:26

## 2017-04-23 RX ADMIN — PANTOPRAZOLE SODIUM 40 MG: 40 TABLET, DELAYED RELEASE ORAL at 10:35

## 2017-04-23 RX ADMIN — Medication 10 ML: at 06:21

## 2017-04-23 RX ADMIN — ACETAMINOPHEN 650 MG: 325 TABLET ORAL at 15:45

## 2017-04-23 RX ADMIN — FUROSEMIDE 40 MG: 10 INJECTION, SOLUTION INTRAMUSCULAR; INTRAVENOUS at 18:24

## 2017-04-23 RX ADMIN — POLYETHYLENE GLYCOL 3350 17 G: 17 POWDER, FOR SOLUTION ORAL at 10:37

## 2017-04-23 RX ADMIN — METRONIDAZOLE 500 MG: 250 TABLET, FILM COATED ORAL at 23:52

## 2017-04-23 RX ADMIN — METRONIDAZOLE 500 MG: 250 TABLET, FILM COATED ORAL at 13:02

## 2017-04-23 RX ADMIN — ERYTHROMYCIN: 5 OINTMENT OPHTHALMIC at 06:21

## 2017-04-23 RX ADMIN — METRONIDAZOLE 500 MG: 250 TABLET, FILM COATED ORAL at 06:21

## 2017-04-23 RX ADMIN — Medication 325 MG: at 10:34

## 2017-04-23 RX ADMIN — ACETAMINOPHEN 650 MG: 325 TABLET ORAL at 20:29

## 2017-04-23 RX ADMIN — METOPROLOL TARTRATE 25 MG: 25 TABLET ORAL at 10:34

## 2017-04-23 RX ADMIN — METOPROLOL TARTRATE 25 MG: 25 TABLET ORAL at 20:29

## 2017-04-23 RX ADMIN — METRONIDAZOLE 500 MG: 250 TABLET, FILM COATED ORAL at 00:25

## 2017-04-23 RX ADMIN — METRONIDAZOLE 500 MG: 250 TABLET, FILM COATED ORAL at 18:23

## 2017-04-23 NOTE — PROGRESS NOTES
Medical Progress Note      NAME: Brianna Hammer   :  1924  MRM:  [id number]    Date/Time: 2017  10:10 AM       Assessment / Plan:     Enteritis (2017): Noted on CT. Evaluated by general surgery and GI. Abdomen remains distended, but repeat CT with resolving enteritis and no obstruction. May be contributed by constipation. -- Continue Levofloxacin and metronidazole, day 6   -- continue bowel regimen, add suppository today      Pleural effusion / Hypoxia:  Moderate on right by CT. Remains on oxygen. Suspect volume overload. Was on lasix at home BID. Suspect a component of chronic diastolic dysfunction. -- start lasix   -- daily weight   -- check echo     Chronic A-fib ([facility] (2017):  HR much improved. INR elevated. -- continue metoprolol tartrate   -- pharmacy to dose coumadin     Acute confusion:  May be from acute illness, hospitalization, change in environment. Head CT scan done  was unremarkable. Seems much improved as appropriately conversational.   -- avoid sedating medications    Acute conjuctivitis:  Resolved. -- continue erythromycin ointment      CKD (chronic kidney disease) stage 3, GFR 30-59 ml/min (2017):  Creatinine improved and now appears to be at baseline. -- monitor      HTN (hypertension) (10/8/2015): BP elevated at times. 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 -- Continue diabetic diet and SSI alone      High cholesterol (10/8/2015):   -- continue Simvastatin      STEPHANI (obstructive sleep apnea) (2017):  Declines CPAP.     UTI (2017): Urine Cx with Klebsiella pneumoniae. -- finish course of levaquin     Neck pain:  Seems to have mild spasm of left neck. -- reposition   -- use tylenol as needed         Subjective:     Chief Complaint:  Headache. Describes left sided neck pain. Worse with movement. Does get this time to time at home. No n/v.   Denies abd pain.  Does not recall having BM recently. ROS:  (bold if positive, if negative)              Objective:       Vitals:          Last 24hrs VS reviewed since prior progress note. Most recent are:    Visit Vitals    /65 (BP 1 Location: Left arm, BP Patient Position: At rest)    Pulse 85    Temp 97 °F (36.1 °C)    Resp 18    Ht 5' 3\" (1.6 m)    Wt 85.2 kg (187 lb 13.3 oz)    SpO2 96%    Breastfeeding No    BMI 33.27 kg/m2     SpO2 Readings from Last 6 Encounters:   04/23/17 96%   12/22/16 97%   05/27/16 95%   12/09/15 100%   11/20/15 96%   10/16/15 94%    O2 Flow Rate (L/min): 2 l/min   No intake or output data in the 24 hours ending 04/23/17 1051       Exam:     Physical Exam:    Gen:  Well-developed, well-nourished, in no acute distress  HEENT:  Pink conjunctivae, hearing intact to voice, moist mucous membranes  Neck:  Supple, mild spasm palpated over left neck with mild ttp  Resp:  No accessory muscle use, poor inspiratory effort, clear breath sounds without wheezes rales or rhonchi  Card:  No murmurs, normal S1, S2 without thrills or peripheral edema  Abd:  Soft, non-tender, distended, normoactive bowel sounds are present  Musc:  No cyanosis  Neuro:   Face symmetric, tongue midline, speech fluent,  strength is 5/5 bilaterally and dorsi / plantarflexion is 5/5 bilaterally, follows commands appropriately  Psych:  Poor insight, alert     Telemetry reviewed:   AFIB    Medications Reviewed: (see below)    Lab Data Reviewed: (see below)    ______________________________________________________________________    Medications:     Current Facility-Administered Medications   Medication Dose Route Frequency    metoprolol tartrate (LOPRESSOR) tablet 25 mg  25 mg Oral BID    senna-docusate (PERICOLACE) 8.6-50 mg per tablet 1 Tab  1 Tab Oral BID    polyethylene glycol (MIRALAX) packet 17 g  17 g Oral BID    erythromycin (ILOTYCIN) 5 mg/gram (0.5 %) ophthalmic ointment   Both Eyes Q8H    bisacodyl (DULCOLAX) suppository 10 mg  10 mg Rectal DAILY PRN    ferrous sulfate tablet 325 mg  1 Tab Oral DAILY WITH BREAKFAST    metroNIDAZOLE (FLAGYL) tablet 500 mg  500 mg Oral Q6H    levoFLOXacin (LEVAQUIN) tablet 750 mg  750 mg Oral Q48H    Warfarin - Pharmacy to dose   Other Rx Dosing/Monitoring    sodium chloride (NS) flush 5-10 mL  5-10 mL IntraVENous PRN    acetaminophen (TYLENOL) tablet 650 mg  650 mg Oral Q4H PRN    insulin lispro (HUMALOG) injection   SubCUTAneous AC&HS    glucose chewable tablet 16 g  4 Tab Oral PRN    dextrose (D50W) injection syrg 12.5-25 g  12.5-25 g IntraVENous PRN    glucagon (GLUCAGEN) injection 1 mg  1 mg IntraMUSCular PRN    febuxostat (ULORIC) tablet 40 mg  40 mg Oral DAILY    pravastatin (PRAVACHOL) tablet 40 mg  40 mg Oral QHS    pantoprazole (PROTONIX) tablet 40 mg  40 mg Oral DAILY            Lab Review:     Recent Labs      04/23/17   0333   WBC  12.5*   HGB  10.4*   HCT  32.7*   PLT  316     Recent Labs      04/23/17   0333  04/22/17   0526  04/21/17   1505  04/21/17   0423   NA  146*  145  144   --    K  4.0  3.8  3.9   --    CL  110*  110*  110*   --    CO2  24  21  24   --    GLU  121*  138*  148*   --    BUN  16  12  14   --    CREA  1.29*  1.22*  1.35*   --    CA  8.6  8.6  8.3*   --    INR  3.5*  3.0*   --   2.8*     Lab Results   Component Value Date/Time    Glucose (POC) 100 04/23/2017 07:31 AM    Glucose (POC) 111 04/22/2017 09:17 PM    Glucose (POC) 137 04/22/2017 04:29 PM    Glucose (POC) 137 04/22/2017 11:39 AM    Glucose (POC) 123 04/22/2017 08:56 AM         Total time spent with patient: 28 Minutes                  Care Plan discussed with: Patient and Nursing Staff    Discussed:  Care Plan    Prophylaxis:  Coumadin    Disposition:  [facility]/Rehab           ___________________________________________________    Attending Physician: Ashley Reed MD

## 2017-04-23 NOTE — PROGRESS NOTES
[facility] Dosing Services: 4/23/17    Consult for Warfarin Dosing by Pharmacy by Dr. Star Zuñiga provided for this 80 y.o. female , for indication of Atrial Fibrillation. Day of Therapy : resumed - home dose 5 mg MWF, 2.5 mg TTSS - supra therapeutic on admission  Dose to achieve an INR goal of 2-3    Hold  Warfarin today - INR 3.5    Significant drug interactions: levofloxacin, metronidazole, Vitamin K 2.5 mg given 4/16 and 4/17  Previous dose given 1 mg   PT/INR Lab Results   Component Value Date/Time    INR 3.5 04/23/2017 03:33 AM      Platelets Lab Results   Component Value Date/Time    PLATELET 510 86/73/7205 03:33 AM      H/H Lab Results   Component Value Date/Time    HGB 10.4 04/23/2017 03:33 AM        Pharmacy to follow daily and will provide subsequent Warfarin dosing based on clinical status.   Shayy De La Torre)  Contact information [phone]

## 2017-04-23 NOTE — ROUTINE PROCESS
Bedside and Verbal shift change report given to Vern So RN (oncoming nurse) by Nataly Ferrell RN (offgoing nurse). Report included the following information SBAR, Intake/Output, MAR and Accordion.

## 2017-04-23 NOTE — PROGRESS NOTES
Bedside and Verbal shift change report given to Bryanna Vargas RN (oncoming nurse) by Adela Mills RN (offgoing nurse). Report included the following information SBAR, Kardex, Procedure Summary, Intake/Output, MAR and Recent Results.

## 2017-04-24 ENCOUNTER — APPOINTMENT (OUTPATIENT)
Dept: GENERAL RADIOLOGY | Age: 82
DRG: 391 | End: 2017-04-24
Attending: INTERNAL MEDICINE
Payer: MEDICARE

## 2017-04-24 LAB
ANION GAP BLD CALC-SCNC: 13 MMOL/L (ref 5–15)
BNP SERPL-MCNC: 3434 PG/ML (ref 0–450)
BUN SERPL-MCNC: 23 MG/DL (ref 6–20)
BUN/CREAT SERPL: 16 (ref 12–20)
CALCIUM SERPL-MCNC: 8.5 MG/DL (ref 8.5–10.1)
CHLORIDE SERPL-SCNC: 108 MMOL/L (ref 97–108)
CO2 SERPL-SCNC: 23 MMOL/L (ref 21–32)
CREAT SERPL-MCNC: 1.46 MG/DL (ref 0.55–1.02)
ERYTHROCYTE [DISTWIDTH] IN BLOOD BY AUTOMATED COUNT: 15.8 % (ref 11.5–14.5)
GLUCOSE BLD STRIP.AUTO-MCNC: 102 MG/DL (ref 65–100)
GLUCOSE BLD STRIP.AUTO-MCNC: 106 MG/DL (ref 65–100)
GLUCOSE BLD STRIP.AUTO-MCNC: 152 MG/DL (ref 65–100)
GLUCOSE BLD STRIP.AUTO-MCNC: 159 MG/DL (ref 65–100)
GLUCOSE SERPL-MCNC: 127 MG/DL (ref 65–100)
HCT VFR BLD AUTO: 31.9 % (ref 35–47)
HGB BLD-MCNC: 10.5 G/DL (ref 11.5–16)
INR PPP: 3.4 (ref 0.9–1.1)
MCH RBC QN AUTO: 30.3 PG (ref 26–34)
MCHC RBC AUTO-ENTMCNC: 32.9 G/DL (ref 30–36.5)
MCV RBC AUTO: 91.9 FL (ref 80–99)
PLATELET # BLD AUTO: 323 K/UL (ref 150–400)
POTASSIUM SERPL-SCNC: 3.8 MMOL/L (ref 3.5–5.1)
PROTHROMBIN TIME: 35.6 SEC (ref 9–11.1)
RBC # BLD AUTO: 3.47 M/UL (ref 3.8–5.2)
SERVICE CMNT-IMP: ABNORMAL
SODIUM SERPL-SCNC: 144 MMOL/L (ref 136–145)
WBC # BLD AUTO: 13 K/UL (ref 3.6–11)

## 2017-04-24 PROCEDURE — 74011250637 HC RX REV CODE- 250/637: Performed by: INTERNAL MEDICINE

## 2017-04-24 PROCEDURE — 71010 XR CHEST PORT: CPT

## 2017-04-24 PROCEDURE — 77030027138 HC INCENT SPIROMETER -A

## 2017-04-24 PROCEDURE — 74011250637 HC RX REV CODE- 250/637: Performed by: PHYSICIAN ASSISTANT

## 2017-04-24 PROCEDURE — 36415 COLL VENOUS BLD VENIPUNCTURE: CPT | Performed by: INTERNAL MEDICINE

## 2017-04-24 PROCEDURE — 83880 ASSAY OF NATRIURETIC PEPTIDE: CPT | Performed by: NURSE PRACTITIONER

## 2017-04-24 PROCEDURE — 65660000000 HC RM CCU STEPDOWN

## 2017-04-24 PROCEDURE — 85027 COMPLETE CBC AUTOMATED: CPT | Performed by: INTERNAL MEDICINE

## 2017-04-24 PROCEDURE — 82962 GLUCOSE BLOOD TEST: CPT

## 2017-04-24 PROCEDURE — 77030020186 HC BOOT HL PROTCT SAGE -B

## 2017-04-24 PROCEDURE — 77030018846 HC SOL IRR STRL H20 ICUM -A

## 2017-04-24 PROCEDURE — 97110 THERAPEUTIC EXERCISES: CPT | Performed by: PHYSICAL THERAPIST

## 2017-04-24 PROCEDURE — 74011250636 HC RX REV CODE- 250/636: Performed by: INTERNAL MEDICINE

## 2017-04-24 PROCEDURE — 85610 PROTHROMBIN TIME: CPT | Performed by: INTERNAL MEDICINE

## 2017-04-24 PROCEDURE — 80048 BASIC METABOLIC PNL TOTAL CA: CPT | Performed by: INTERNAL MEDICINE

## 2017-04-24 PROCEDURE — 77010033678 HC OXYGEN DAILY

## 2017-04-24 PROCEDURE — 94667 MNPJ CHEST WALL 1ST: CPT

## 2017-04-24 RX ORDER — WARFARIN 1 MG/1
1 TABLET ORAL ONCE
Status: COMPLETED | OUTPATIENT
Start: 2017-04-24 | End: 2017-04-24

## 2017-04-24 RX ORDER — FUROSEMIDE 10 MG/ML
40 INJECTION INTRAMUSCULAR; INTRAVENOUS DAILY
Status: DISCONTINUED | OUTPATIENT
Start: 2017-04-24 | End: 2017-04-25

## 2017-04-24 RX ADMIN — METRONIDAZOLE 500 MG: 250 TABLET, FILM COATED ORAL at 05:26

## 2017-04-24 RX ADMIN — METRONIDAZOLE 500 MG: 250 TABLET, FILM COATED ORAL at 14:06

## 2017-04-24 RX ADMIN — FUROSEMIDE 40 MG: 10 INJECTION, SOLUTION INTRAMUSCULAR; INTRAVENOUS at 09:13

## 2017-04-24 RX ADMIN — PANTOPRAZOLE SODIUM 40 MG: 40 TABLET, DELAYED RELEASE ORAL at 09:13

## 2017-04-24 RX ADMIN — METOPROLOL TARTRATE 25 MG: 25 TABLET ORAL at 09:13

## 2017-04-24 RX ADMIN — ACETAMINOPHEN 650 MG: 325 TABLET ORAL at 10:46

## 2017-04-24 RX ADMIN — Medication 325 MG: at 09:13

## 2017-04-24 RX ADMIN — WARFARIN SODIUM 1 MG: 1 TABLET ORAL at 18:48

## 2017-04-24 RX ADMIN — LEVOFLOXACIN 750 MG: 500 TABLET, FILM COATED ORAL at 18:48

## 2017-04-24 RX ADMIN — Medication 10 ML: at 05:27

## 2017-04-24 RX ADMIN — FEBUXOSTAT 40 MG: 40 TABLET ORAL at 09:13

## 2017-04-24 RX ADMIN — PRAVASTATIN SODIUM 40 MG: 20 TABLET ORAL at 21:23

## 2017-04-24 RX ADMIN — METRONIDAZOLE 500 MG: 250 TABLET, FILM COATED ORAL at 21:23

## 2017-04-24 RX ADMIN — ERYTHROMYCIN: 5 OINTMENT OPHTHALMIC at 05:26

## 2017-04-24 RX ADMIN — METOPROLOL TARTRATE 25 MG: 25 TABLET ORAL at 21:24

## 2017-04-24 RX ADMIN — ACETAMINOPHEN 650 MG: 325 TABLET ORAL at 16:47

## 2017-04-24 NOTE — PROGRESS NOTES
Bedside and Verbal shift change report given to Sushma Steele RN (oncoming nurse) by Margi Porter RN (offgoing nurse). Report included the following information SBAR, Kardex, Procedure Summary, Intake/Output and MAR.

## 2017-04-24 NOTE — ROUTINE PROCESS
Bedside and Verbal shift change report given to Yarely Clayton RN (oncoming nurse) by Violetta Chase RN (offgoing nurse). Report included the following information SBAR, Intake/Output, MAR, Accordion and Recent Results.

## 2017-04-24 NOTE — PROGRESS NOTES
Bedside and Verbal shift change report given to Bora Martinez RN (oncoming nurse) by Arsh Ocampo RN (offgoing nurse). Report included the following information SBAR, Kardex, Procedure Summary, Intake/Output, MAR, Recent Results and Cardiac Rhythm NSR with PVC.

## 2017-04-24 NOTE — PROGRESS NOTES
Problem: Mobility Impaired (Adult and Pediatric)  Goal: *Acute Goals and Plan of Care (Insert Text)  Physical Therapy Goals  Initiated 4/17/2017  1. Patient will move from supine to sit and sit to supine , scoot up and down and roll side to side in bed with independence within 7 day(s). 2. Patient will transfer from bed to chair and chair to bed with modified independence using the least restrictive device within 7 day(s). 3. Patient will perform sit to stand with modified independence within 7 day(s). 4. Patient will ambulate with modified independence for 200 feet with the least restrictive device within 7 day(s). PHYSICAL THERAPY TREATMENT  Patient: Xuan Corrales (1 y.o. female)  Date: 4/24/2017  Diagnosis: Enteritis Enteritis       Precautions:  fall      ASSESSMENT:  Ms Jena Siemens was due for a weekly reassessment, but she was unwilling to attempt OOB today, complaining of back & R flank pain. She moves very little in bed, and expect some of this discomfort is due to extended static positioning. Addressed with nursing who are in agreement. She was willing to perform light exercise & was repositioned in full L sidelying. Activity level needs to be increased as possible. Will need some level of inpatient rehab at discharge to optimize function with goal of return home with family. Attempt reassessment tomorrow. Progression toward goals:  [ ]    Improving appropriately and progressing toward goals  [ ]    Improving slowly and progressing toward goals  [ ]    Not making progress toward goals and plan of care will be adjusted       PLAN:  Patient continues to benefit from skilled intervention to address the above impairments. Continue treatment per established plan of care. Discharge Recommendations:  Rehab  Further Equipment Recommendations for Discharge:  tbd at ultimate disposition       SUBJECTIVE:   Patient stated I just can't do it today.       OBJECTIVE DATA SUMMARY:   Critical Behavior:  Neurologic State: Alert, Confused  Orientation Level: Oriented to person, Oriented to place, Disoriented to situation, Disoriented to time  Cognition: Follows commands     Functional Mobility Training:  Bed Mobility:   required mod A one to roll from supine to L sidelying              Therapeutic Exercises:   performed low level bed exercises to include ankle pumping & gentle LE guided flexion/ ext. Modified bridging              Gently pelvic rotation in sidelying     Pain: sharp R flank & back pain with any trunk movement  Pain Scale 1: Numeric (0 - 10)  Pain Intensity 1: 10  Pain Location 1: Hand  Pain Orientation 1: Right  Pain Description 1: Aching; Sore  Pain Intervention(s) 1: Emotional support;Rest;Repositioned  Activity Tolerance:   Poor today due to pain  Please refer to the flowsheet for vital signs taken during this treatment.   After treatment:   [ ]    Patient left in no apparent distress sitting up in chair  [X]    Patient left in no apparent distress in bed  [X]    Call bell left within reach  [X]    Nursing notified  [ ]    Caregiver present  [ ]    Bed alarm activated      COMMUNICATION/COLLABORATION:   The patients plan of care was discussed with: Occupational Therapist, Registered Nurse and Certified Nursing Assistant/Patient Care Technician     Ariel Elmore PT   Time Calculation: 19 mins

## 2017-04-24 NOTE — PROGRESS NOTES
Went to see patient for CPT. Patient unable to sit up and patient stated that she has pain in her sides--RT unable to perform CPT on lower lobes. CPT was done only on upper lobes/chest.    Patient's breath sounds were clear, diminished in lower and she did not have a productive cough pre or post.     Also gave patient Acapella to use but patient is unable to take adequate breaths in and out for effective use. May try again when patient is able to sit up straight and take deeper breaths. Family at bedside.

## 2017-04-24 NOTE — CONSULTS
Name: Marcelle Aviles: [address]   : 1924 Admit Date: 2017   Phone: [phone]  Room: [facility]   PCP: Tammy Gramajo MD  MRN: [id number]   Date: 2017  Code: Full Code        HPI:      Chart and notes reviewed. Data reviewed. I review the patient's current medications in the medical record at each encounter.  I have evaluated and examined the patient. 3:46 PM       History was obtained from patient. I was asked by Nancy Barone MD to see Ashlyn Ng in consultation for a chief complaint of RLL atelectasis. History of Present Illness:   is a pleasant, 80year old lady that presented to [facility] on  with abdominal pain. She was diagnosed with acute enteritis and admitted for further management. She has been treated with Levaquin and Flagyl for the last 7 days with improvement from that standpoint. Today, she was to go to [address] [place] [address] for rehab, but was denied as she has had worsening shortness of breath. She notes that she has right hip pain that keeps her from mobilizing much. She says that her shortness of breath \"comes and goes. \"  Denies cough or sputum production. Her daughter notes that the last time she was up to the chair was Thursday, and that she became very SOB when mobilizing at that point. Denies CP, LE pain or swelling. She denies past history of lung disease and is a never smoker. Images:  Personally reviewed. RLL atelectasis and small right pleural effusion.     WBC 13  Hgb 10.5  INR 3.4  Creatinine 1.46  Urine culture + for Klebsiella Pneumoniae  Repeat abdominal CT  with resolution of small bowel wall thickening, minimal colonic diverticulosis, no ascites or inflammatory infiltration  EF 95%, grade 1 diastolic dysfunction, LA moderately dilated, RA mildly dilated  Creatinine 1.46    Past Medical History:   Diagnosis Date    Arrhythmia     Irregular HR-\"beats fast\"    CKD (chronic kidney disease) stage 3, GFR 30-59 ml/min 4/16/2017    Diabetes (HCC)     GERD (gastroesophageal reflux disease)     High cholesterol     Hx of blood clots     Hypertension     PUD (peptic ulcer disease)     Sleep apnea     Will not use mask    Stroke ([facility]     TIA       Past Surgical History:   Procedure Laterality Date    HX APPENDECTOMY      HX BACK SURGERY      lower back    HX HEMORRHOIDECTOMY      HX HYSTERECTOMY      HX ORTHOPAEDIC      knee surgery    HX ORTHOPAEDIC      carpal tunnel surgery    HX OTHER SURGICAL      HX OTHER SURGICAL  10/13/15    TTE: EF 55-60%, LA dilated       Family History   Problem Relation Age of Onset    Parkinsonism Mother     Cancer Father      lung       Social History   Substance Use Topics    Smoking status: Never Smoker    Smokeless tobacco: Never Used    Alcohol use No       Allergies   Allergen Reactions    Accupril [Quinapril] Unable to Obtain    Allopurinol Unknown (comments)    Demerol [Meperidine] Hives    Morphine Hives    Sulfamethoxazole-Trimethoprim Myalgia       Current Facility-Administered Medications   Medication Dose Route Frequency    furosemide (LASIX) injection 40 mg  40 mg IntraVENous DAILY    warfarin (COUMADIN) tablet 1 mg  1 mg Oral ONCE    metoprolol tartrate (LOPRESSOR) tablet 25 mg  25 mg Oral BID    senna-docusate (PERICOLACE) 8.6-50 mg per tablet 1 Tab  1 Tab Oral BID    polyethylene glycol (MIRALAX) packet 17 g  17 g Oral BID    bisacodyl (DULCOLAX) suppository 10 mg  10 mg Rectal DAILY PRN    ferrous sulfate tablet 325 mg  1 Tab Oral DAILY WITH BREAKFAST    metroNIDAZOLE (FLAGYL) tablet 500 mg  500 mg Oral Q6H    levoFLOXacin (LEVAQUIN) tablet 750 mg  750 mg Oral Q48H    Warfarin - Pharmacy to dose   Other Rx Dosing/Monitoring    sodium chloride (NS) flush 5-10 mL  5-10 mL IntraVENous PRN    acetaminophen (TYLENOL) tablet 650 mg  650 mg Oral Q4H PRN    insulin lispro (HUMALOG) injection   SubCUTAneous AC&HS    glucose chewable tablet 16 g  4 Tab Oral PRN    dextrose (D50W) injection syrg 12.5-25 g  12.5-25 g IntraVENous PRN    glucagon (GLUCAGEN) injection 1 mg  1 mg IntraMUSCular PRN    febuxostat (ULORIC) tablet 40 mg  40 mg Oral DAILY    pravastatin (PRAVACHOL) tablet 40 mg  40 mg Oral QHS    pantoprazole (PROTONIX) tablet 40 mg  40 mg Oral DAILY         REVIEW OF SYSTEMS   Negative except as stated in the HPI. Physical Exam:   Visit Vitals    /56 (BP 1 Location: Left arm, BP Patient Position: Head of bed elevated (Comment degrees))    Pulse 72    Temp 97.8 °F (36.6 °C)    Resp 18    Ht 5' 3\" (1.6 m)    Wt 85.2 kg (187 lb 13.3 oz)    SpO2 96%    Breastfeeding No    BMI 33.27 kg/m2       General:  Alert, cooperative, no distress, elderly, appears stated age. Head:  Normocephalic, without obvious abnormality, atraumatic. Eyes:  Conjunctivae/corneas clear. Nose: Nares normal. Septum midline. Mucosa normal.    Throat: Lips, mucosa, and tongue normal. Teeth and gums normal.   Neck: Supple, symmetrical, trachea midline, no adenopathy. Lungs:   Quiet bs throughout   Chest wall:  No tenderness or deformity. Heart:  Regular rate and rhythm, S1, S2 normal, no murmur, click, rub or gallop. Abdomen:   Soft, non-tender. Bowel sounds normal.    Extremities: Extremities normal, atraumatic, no cyanosis or edema. Pulses: 2+ and symmetric all extremities.    Skin: Skin color, texture, turgor normal. No rashes or lesions   Lymph nodes: Cervical  nodes normal.   Neurologic: Grossly nonfocal       Lab Results   Component Value Date/Time    Sodium 144 04/24/2017 01:28 AM    Potassium 3.8 04/24/2017 01:28 AM    Chloride 108 04/24/2017 01:28 AM    CO2 23 04/24/2017 01:28 AM    BUN 23 04/24/2017 01:28 AM    Creatinine 1.46 04/24/2017 01:28 AM    Glucose 127 04/24/2017 01:28 AM    Calcium 8.5 04/24/2017 01:28 AM    Magnesium 1.6 04/17/2017 03:34 AM    Phosphorus 4.5 04/17/2017 03:34 AM    Lactic acid 0.8 04/17/2017 03:34 AM Lab Results   Component Value Date/Time    WBC 13.0 04/24/2017 01:28 AM    HGB 10.5 04/24/2017 01:28 AM    PLATELET 800 84/36/7282 01:28 AM    MCV 91.9 04/24/2017 01:28 AM       Lab Results   Component Value Date/Time    INR 3.4 04/24/2017 01:28 AM    aPTT >130.0 04/16/2017 06:20 PM    AST (SGOT) 20 04/16/2017 05:24 PM    Alk.  phosphatase 55 04/16/2017 05:24 PM    Protein, total 7.1 04/16/2017 05:24 PM    Albumin 3.2 04/16/2017 05:24 PM    Globulin 3.9 04/16/2017 05:24 PM       Lab Results   Component Value Date/Time    Iron 33 04/19/2017 05:11 AM    TIBC 150 04/19/2017 05:11 AM    Iron % saturation 22 04/19/2017 05:11 AM       No results found for: SR, CRP, NELSY, ANAIGG, RA, RPR, RPRT, VDRLT, VDRLS, TSH, TSHEXT     No results found for: PH, PHI, PCO2, PCO2I, PO2, PO2I, HCO3, HCO3I, FIO2, FIO2I    Lab Results   Component Value Date/Time    Troponin-I, Qt. <0.04 04/16/2017 05:24 PM        Lab Results   Component Value Date/Time    Culture result: KLEBSIELLA PNEUMONIAE  (PREDOMINATING)   04/16/2017 11:16 PM    Culture result: MIXED UROGENITAL ADELFO ISOLATED 04/16/2017 11:16 PM    Culture result: ENTEROCOCCUS FAECALIS GROUP D 10/16/2015 09:20 PM       No results found for: TOXA1, RPR, HBCM, HBSAG, HAAB, HCAB, HCAB1, HAAT, G6PD, CRYAC, HIVGT, HIVR, HIV1, HIV12, HIVPC, HIVRPI    No results found for: VANCT, CPK    Lab Results   Component Value Date/Time    Color DARK YELLOW 04/16/2017 11:16 PM    Appearance CLOUDY 04/16/2017 11:16 PM    pH (UA) 5.0 04/16/2017 11:16 PM    Protein NEGATIVE  04/16/2017 11:16 PM    Glucose NEGATIVE  04/16/2017 11:16 PM    Ketone NEGATIVE  04/16/2017 11:16 PM    Bilirubin NEGATIVE  04/16/2017 11:16 PM    Blood NEGATIVE  04/16/2017 11:16 PM    Urobilinogen 0.2 04/16/2017 11:16 PM    Nitrites NEGATIVE  04/16/2017 11:16 PM    Leukocyte Esterase MODERATE 04/16/2017 11:16 PM    WBC 5-10 04/16/2017 11:16 PM    RBC 0-5 04/16/2017 11:16 PM    Bacteria 2+ 04/16/2017 11:16 PM IMPRESSION  · Dyspnea  · Abnormal CXR: with small right pleural effusion and RLL atelectasis  · Enteritis  · Atrial Fibrillation  · CKD Stage 3  · STEPHANI; not treated    PLAN  · Supplemental O2 to keep sats >90%  · Encourage IS and acapella use  · Continue Levaquin and Flagyl  · Add Mucinex  · Check Pro-BNP, may need further diuresis  · Needs to mobilize with PT/OT  · GI Prophylaxis:  Protonix  · DVT Prophylaxis:  AC with Coumadin    Thank you very much for the consult. We will follow along with you in Ms. Bangura's care.       Ashley Garcias NP

## 2017-04-24 NOTE — PROGRESS NOTES
4/24/2017 10:18 AM Dr. Jones from [facility] evaluated pt today. Per Dr. Jones, pt is not medically stable for [facility] to accept today. CM will follow up.  RADHA Garcia

## 2017-04-24 NOTE — PROGRESS NOTES
Medical Progress Note      NAME: Rosalina Lee   :  1924  MRM:  [id number]    Date/Time: 2017  10:10 AM       Assessment / Plan:     Enteritis (2017):  Evaluated by general surgery and GI. Abdomen remains distended, but nontender. Repeat CT with resolving enteritis and no obstruction. May be contributed by constipation. Had BM . -- Continue Levofloxacin and metronidazole, day 7   -- continue bowel regimen      Pleural effusion / Hypoxia:  Moderate on right by CT. Remains on oxygen. Suspect a component of volume overload. Was on lasix at home BID. Echo with EF 65% with grade 1 diastolic dysfunction. -- continue lasix   -- daily weight    Chronic A-fib ([facility] (2017):  HR improved. INR elevated. -- continue metoprolol tartrate   -- pharmacy to dose coumadin     Acute confusion / Acute encephalopathy in setting of enteritis:  May be from acute illness, hospitalization, change in environment. Head CT scan done  was unremarkable. Has improved with avoiding sedating medications. Acute conjuctivitis:  Resolved. -- stop erythromycin ointment      CKD (chronic kidney disease) stage 3, GFR 30-59 ml/min (2017):  Creatinine slightly elevated this AM, but appears to be at baseline. -- decrease lasix dose to once a day      HTN (hypertension) (10/8/2015): BP improved with metoprolol tartrate. 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 -- Continue diabetic diet and SSI alone      High cholesterol (10/8/2015):   -- continue Simvastatin      STEPHANI (obstructive sleep apnea) (2017):  Declines CPAP.     UTI (2017): Urine Cx with Klebsiella pneumoniae. -- finish course of levaquin     Neck pain:  Seems to have mild spasm of left neck. Improved. Leukocytosis:  Slight trend upwards. Afebrile. -- monitor       Subjective:     Chief Complaint:  I don't feel well. Denies particular complaint. No HA, n/v.  Denies cp or sob. Denies abd pain. ROS:  (bold if positive, if negative)              Objective:       Vitals:          Last 24hrs VS reviewed since prior progress note. Most recent are:    Visit Vitals    /55 (BP 1 Location: Left arm, BP Patient Position: At rest)    Pulse 87    Temp 97.7 °F (36.5 °C)    Resp 18    Ht 5' 3\" (1.6 m)    Wt 85.2 kg (187 lb 13.3 oz)    SpO2 96%    Breastfeeding No    BMI 33.27 kg/m2     SpO2 Readings from Last 6 Encounters:   04/24/17 96%   12/22/16 97%   05/27/16 95%   12/09/15 100%   11/20/15 96%   10/16/15 94%    O2 Flow Rate (L/min): 2 l/min       Intake/Output Summary (Last 24 hours) at 04/24/17 0817  Last data filed at 04/24/17 0803   Gross per 24 hour   Intake                0 ml   Output             1600 ml   Net            -1600 ml          Exam:     Physical Exam:    Gen:  Well-developed, well-nourished, in no acute distress  HEENT:  Pink conjunctivae, hearing intact to voice, moist mucous membranes  Neck:  Supple  Resp:  No accessory muscle use, poor inspiratory effort, clear breath sounds without wheezes rales or rhonchi  Card:  No murmurs, normal S1, S2 without thrills or peripheral edema  Abd:  Soft, non-tender, distended, normoactive bowel sounds are present  Musc:  No cyanosis  Neuro:   Face symmetric, tongue midline, speech fluent,  strength is 5/5 bilaterally, follows commands appropriately  Psych:  Improved insight, alert     Telemetry reviewed:   AFIB    Medications Reviewed: (see below)    Lab Data Reviewed: (see below)    ______________________________________________________________________    Medications:     Current Facility-Administered Medications   Medication Dose Route Frequency    furosemide (LASIX) injection 40 mg  40 mg IntraVENous DAILY    metoprolol tartrate (LOPRESSOR) tablet 25 mg  25 mg Oral BID    senna-docusate (PERICOLACE) 8.6-50 mg per tablet 1 Tab  1 Tab Oral BID    polyethylene glycol (MIRALAX) packet 17 g  17 g Oral BID    bisacodyl (DULCOLAX) suppository 10 mg  10 mg Rectal DAILY PRN    ferrous sulfate tablet 325 mg  1 Tab Oral DAILY WITH BREAKFAST    metroNIDAZOLE (FLAGYL) tablet 500 mg  500 mg Oral Q6H    levoFLOXacin (LEVAQUIN) tablet 750 mg  750 mg Oral Q48H    Warfarin - Pharmacy to dose   Other Rx Dosing/Monitoring    sodium chloride (NS) flush 5-10 mL  5-10 mL IntraVENous PRN    acetaminophen (TYLENOL) tablet 650 mg  650 mg Oral Q4H PRN    insulin lispro (HUMALOG) injection   SubCUTAneous AC&HS    glucose chewable tablet 16 g  4 Tab Oral PRN    dextrose (D50W) injection syrg 12.5-25 g  12.5-25 g IntraVENous PRN    glucagon (GLUCAGEN) injection 1 mg  1 mg IntraMUSCular PRN    febuxostat (ULORIC) tablet 40 mg  40 mg Oral DAILY    pravastatin (PRAVACHOL) tablet 40 mg  40 mg Oral QHS    pantoprazole (PROTONIX) tablet 40 mg  40 mg Oral DAILY            Lab Review:     Recent Labs      04/24/17 0128 04/23/17   0333   WBC  13.0*  12.5*   HGB  10.5*  10.4*   HCT  31.9*  32.7*   PLT  323  316     Recent Labs      04/24/17 0128 04/23/17   0333  04/22/17   0526   NA  144  146*  145   K  3.8  4.0  3.8   CL  108  110*  110*   CO2  23  24  21   GLU  127*  121*  138*   BUN  23*  16  12   CREA  1.46*  1.29*  1.22*   CA  8.5  8.6  8.6   INR  3.4*  3.5*  3.0*     Lab Results   Component Value Date/Time    Glucose (POC) 102 04/24/2017 07:59 AM    Glucose (POC) 106 04/23/2017 09:31 PM    Glucose (POC) 111 04/23/2017 04:14 PM    Glucose (POC) 139 04/23/2017 11:07 AM    Glucose (POC) 100 04/23/2017 07:31 AM         Total time spent with patient: 25 Minutes                  Care Plan discussed with: Patient and Nursing Staff    Discussed:  Care Plan    Prophylaxis:  Coumadin    Disposition:  [facility]/Rehab           ___________________________________________________    Attending Physician: Maria M Jules MD

## 2017-04-24 NOTE — CDMP QUERY
2) =>Possible metabolic Encephalopathy in the setting of acute enteritis AEB acute confusion requiring head CT and discontinuing opioids, now resolved     =>Other Explanation of clinical findings  =>Unable to Determine (no explanation of clinical findings)    The medical record reflects the following clinical findings, treatment, and risk factors:  Risk Factors: 79 yo F admitted with acute enteritis and SIRS   Clinical Indicators: 4/21 PN  \"Acute confusion: May be from acute illness, hospitalization, change in environment. Head CT scan done 4/20 was unremarkable. Seems much improved as appropriately conversational.-- avoid sedating medications\"   Treatment: Head CT, dc all opioids     Please clarify and document your clinical opinion in the progress notes and discharge summary including the definitive and/or presumptive diagnosis, (suspected or probable), related to the above clinical findings. Please include clinical findings supporting your diagnosis.     Thank you for your time   [facility] RN/BSN, [address]  Desk:   [phone]   Other:  [phone]

## 2017-04-24 NOTE — PROGRESS NOTES
[facility] Pharmacy Dosing Services: 4/24/17    Consult for Warfarin Dosing by Pharmacy by Dr. Bessie Tiwari provided for this 80 y.o. female , for indication of Atrial Fibrillation. Day of Therapy : resumed - home dose 5 mg MWF, 2.5 mg TTSS - supra therapeutic on admission  Dose to achieve an INR goal of 2-3     Order entered for  Warfarin  1 (mg) ordered to be given today at 18:00. Significant drug interactions: levofloxacin, metronidazole  Previous dose given Held for INR 3.5   PT/INR Lab Results   Component Value Date/Time    INR 3.4 04/24/2017 01:28 AM      Platelets Lab Results   Component Value Date/Time    PLATELET 041 28/50/6423 01:28 AM      H/H Lab Results   Component Value Date/Time    HGB 10.5 04/24/2017 01:28 AM        Pharmacy to follow daily and will provide subsequent Warfarin dosing based on clinical status.   Shayy De La Torre)  Contact information [phone]

## 2017-04-25 ENCOUNTER — APPOINTMENT (OUTPATIENT)
Dept: GENERAL RADIOLOGY | Age: 82
DRG: 391 | End: 2017-04-25
Attending: NURSE PRACTITIONER
Payer: MEDICARE

## 2017-04-25 LAB
ANION GAP BLD CALC-SCNC: 12 MMOL/L (ref 5–15)
BUN SERPL-MCNC: 32 MG/DL (ref 6–20)
BUN/CREAT SERPL: 18 (ref 12–20)
CALCIUM SERPL-MCNC: 8.2 MG/DL (ref 8.5–10.1)
CHLORIDE SERPL-SCNC: 107 MMOL/L (ref 97–108)
CO2 SERPL-SCNC: 25 MMOL/L (ref 21–32)
CREAT SERPL-MCNC: 1.73 MG/DL (ref 0.55–1.02)
ERYTHROCYTE [DISTWIDTH] IN BLOOD BY AUTOMATED COUNT: 15.9 % (ref 11.5–14.5)
GLUCOSE BLD STRIP.AUTO-MCNC: 107 MG/DL (ref 65–100)
GLUCOSE BLD STRIP.AUTO-MCNC: 125 MG/DL (ref 65–100)
GLUCOSE BLD STRIP.AUTO-MCNC: 175 MG/DL (ref 65–100)
GLUCOSE BLD STRIP.AUTO-MCNC: 187 MG/DL (ref 65–100)
GLUCOSE SERPL-MCNC: 122 MG/DL (ref 65–100)
HCT VFR BLD AUTO: 31.1 % (ref 35–47)
HGB BLD-MCNC: 10.2 G/DL (ref 11.5–16)
INR PPP: 3 (ref 0.9–1.1)
MAGNESIUM SERPL-MCNC: 1.7 MG/DL (ref 1.6–2.4)
MCH RBC QN AUTO: 30 PG (ref 26–34)
MCHC RBC AUTO-ENTMCNC: 32.8 G/DL (ref 30–36.5)
MCV RBC AUTO: 91.5 FL (ref 80–99)
PLATELET # BLD AUTO: 360 K/UL (ref 150–400)
POTASSIUM SERPL-SCNC: 3.6 MMOL/L (ref 3.5–5.1)
PROTHROMBIN TIME: 31.2 SEC (ref 9–11.1)
RBC # BLD AUTO: 3.4 M/UL (ref 3.8–5.2)
SERVICE CMNT-IMP: ABNORMAL
SODIUM SERPL-SCNC: 144 MMOL/L (ref 136–145)
WBC # BLD AUTO: 11.5 K/UL (ref 3.6–11)

## 2017-04-25 PROCEDURE — 85027 COMPLETE CBC AUTOMATED: CPT | Performed by: INTERNAL MEDICINE

## 2017-04-25 PROCEDURE — 85610 PROTHROMBIN TIME: CPT | Performed by: INTERNAL MEDICINE

## 2017-04-25 PROCEDURE — 71010 XR CHEST PORT: CPT

## 2017-04-25 PROCEDURE — 83735 ASSAY OF MAGNESIUM: CPT | Performed by: INTERNAL MEDICINE

## 2017-04-25 PROCEDURE — 74011250637 HC RX REV CODE- 250/637: Performed by: INTERNAL MEDICINE

## 2017-04-25 PROCEDURE — 65660000000 HC RM CCU STEPDOWN

## 2017-04-25 PROCEDURE — 74011250637 HC RX REV CODE- 250/637: Performed by: PHYSICIAN ASSISTANT

## 2017-04-25 PROCEDURE — 80048 BASIC METABOLIC PNL TOTAL CA: CPT | Performed by: INTERNAL MEDICINE

## 2017-04-25 PROCEDURE — 97530 THERAPEUTIC ACTIVITIES: CPT

## 2017-04-25 PROCEDURE — 94668 MNPJ CHEST WALL SBSQ: CPT

## 2017-04-25 PROCEDURE — 82962 GLUCOSE BLOOD TEST: CPT

## 2017-04-25 PROCEDURE — 77010033678 HC OXYGEN DAILY

## 2017-04-25 PROCEDURE — 36415 COLL VENOUS BLD VENIPUNCTURE: CPT | Performed by: INTERNAL MEDICINE

## 2017-04-25 RX ORDER — WARFARIN 2 MG/1
2 TABLET ORAL ONCE
Status: COMPLETED | OUTPATIENT
Start: 2017-04-25 | End: 2017-04-25

## 2017-04-25 RX ADMIN — METOPROLOL TARTRATE 25 MG: 25 TABLET ORAL at 09:44

## 2017-04-25 RX ADMIN — ACETAMINOPHEN 650 MG: 325 TABLET ORAL at 20:53

## 2017-04-25 RX ADMIN — ACETAMINOPHEN 650 MG: 325 TABLET ORAL at 09:40

## 2017-04-25 RX ADMIN — METRONIDAZOLE 500 MG: 250 TABLET, FILM COATED ORAL at 09:45

## 2017-04-25 RX ADMIN — PRAVASTATIN SODIUM 40 MG: 20 TABLET ORAL at 20:46

## 2017-04-25 RX ADMIN — DOCUSATE SODIUM -SENNOSIDES 1 TABLET: 50; 8.6 TABLET, COATED ORAL at 18:07

## 2017-04-25 RX ADMIN — METRONIDAZOLE 500 MG: 250 TABLET, FILM COATED ORAL at 20:46

## 2017-04-25 RX ADMIN — POLYETHYLENE GLYCOL 3350 17 G: 17 POWDER, FOR SOLUTION ORAL at 09:51

## 2017-04-25 RX ADMIN — METRONIDAZOLE 500 MG: 250 TABLET, FILM COATED ORAL at 14:24

## 2017-04-25 RX ADMIN — METRONIDAZOLE 500 MG: 250 TABLET, FILM COATED ORAL at 03:23

## 2017-04-25 RX ADMIN — POLYETHYLENE GLYCOL 3350 17 G: 17 POWDER, FOR SOLUTION ORAL at 18:07

## 2017-04-25 RX ADMIN — WARFARIN SODIUM 2 MG: 2 TABLET ORAL at 18:08

## 2017-04-25 RX ADMIN — METOPROLOL TARTRATE 25 MG: 25 TABLET ORAL at 20:46

## 2017-04-25 RX ADMIN — FEBUXOSTAT 40 MG: 40 TABLET ORAL at 09:47

## 2017-04-25 RX ADMIN — ACETAMINOPHEN 650 MG: 325 TABLET ORAL at 14:24

## 2017-04-25 RX ADMIN — PANTOPRAZOLE SODIUM 40 MG: 40 TABLET, DELAYED RELEASE ORAL at 09:47

## 2017-04-25 RX ADMIN — Medication 325 MG: at 09:43

## 2017-04-25 RX ADMIN — DOCUSATE SODIUM -SENNOSIDES 1 TABLET: 50; 8.6 TABLET, COATED ORAL at 09:47

## 2017-04-25 NOTE — PROGRESS NOTES
Attempted to get patient up to chair. Pt stated she needed to use the bathroom. Pt transferred to [facility] with assist of lift team member Sindhu Preciado and nursing student. Pt very difficult to transfer. Does not fully bear own weight. Pt on BSC for 15 minutes with nursing staff at side entire time. Voided and had a loose BM on the commode. Pt fatigued after time on commode so returned to bed. PT still to work with patient later this afternoon. Pt incontinent of urine when standing on both transfer to and from [facility]. Recommend a brief or mesh underwear with pad while working with PT and on future transfers. Oxygen saturations 93-94% on room air while on commode and after returned to bed. Pt with continued complaints of neck and back discomfort unrelieved by tylenol. Encouraging continuing activity, participation in PT and frequent repositioning.

## 2017-04-25 NOTE — DISCHARGE SUMMARY
Physician Interim Summary   Patient ID:  Deena Dk  [id number]  76 y.o.  4/18/1924    PCP: Rusty Fuentes MD     Consults: Pulmonary/Intensive care, GI and General Surgery    Covering dates: 4/22/2017 through 4/25/2017    Admission Diagnoses: Enteritis    Hospital Course:   Enteritis (4/16/2017): Evaluated by general surgery and GI. Abdomen remains distended, but nontender. Repeat CT with resolving enteritis and no obstruction. Bowel regimen added for constipation. Continues on Levofloxacin and metronidazole.      MAKAYLA on CKD (chronic kidney disease) stage 3, GFR 30-59 ml/min (4/16/2017): Creatinine elevated, likely from diuresis. Lasix held with plans to monitor creatinine.      Pleural effusion / Hypoxia / Atelectasis: Moderate effusion on right by CT. Suspected a component of volume overload and lasix was restarted per home but via IV. Echo with EF 65% with grade 1 diastolic dysfunction. Repeat CXR with worsened right sided atelectasis. Incentive spirometer provided and encouraged OOB. Pulmonary consulted due to lack of improvement.     Chronic A-fib ([address] (4/16/2017): HR controlled. INR at goal. Continues on metoprolol tartrate and coumadin.      Acute confusion / Acute encephalopathy in setting of enteritis: Resolved. May have been from acute illness, hospitalization, change in environment. Head CT scan done 4/20 was unremarkable. Improved with avoiding sedating medications.        Additional hospital course and discharge summary will be done by discharging physician.

## 2017-04-25 NOTE — PROGRESS NOTES
Problem: Patient Education: Go to Patient Education Activity  Goal: Patient/Family Education  PHYSICAL THERAPY TREATMENT  Patient: Pascale Mills (58 y.o. female)  Date: 4/25/2017  Diagnosis: Enteritis Enteritis       Precautions:    Chart, physical therapy assessment, plan of care and goals were reviewed. ASSESSMENT:  Pt agreeable to PT. Performed LE exercise with min assist and cues. Pt total assist of 2 for rolling and supine to sit. Pt progressed to min assist sitting tending to lean to posterior. Pt total assist of 2 sit to stand. Pt making a good effort but needed max to total assist of 2 to maintain stand with RW . Pt returned to supine after second attempt total assist of 2. Pt making effort. Progress slow. Continue goals. Progression toward goals:  [ ]      Improving appropriately and progressing toward goals  [ ]      Improving slowly and progressing toward goals  [ ]      Not making progress toward goals and plan of care will be adjusted       PLAN:  Patient continues to benefit from skilled intervention to address the above impairments. Continue treatment per established plan of care. Discharge Recommendations:  Rehab  Further Equipment Recommendations for Discharge:         SUBJECTIVE:          OBJECTIVE DATA SUMMARY:   Critical Behavior:  Neurologic State: Alert, Confused  Orientation Level: Oriented to person, Oriented to place, Disoriented to situation, Disoriented to time  Cognition: Follows commands     Functional Mobility Training:  Bed Mobility:  Rolling: Assist x2;Total assistance  Supine to Sit: Total assistance;Assist x2  Sit to Supine: Total assistance;Assist x2                       Transfers:  Sit to Stand: Assist x2;Total assistance  Stand to Sit: Assist x2;Total assistance                             Balance:  Sitting: High guard; With support  Standing - Static: Poor        Pain:  Pain Scale 1: Numeric (0 - 10)  Pain Intensity 1: 7  Pain Location 1: Neck  Pain Orientation 1: Posterior; Left  Pain Description 1: Aching  Pain Intervention(s) 1: Medication (see MAR)  Activity Tolerance:   Pt tolerated treatment fair  Please refer to the flowsheet for vital signs taken during this treatment.   After treatment:   [ ] Patient left in no apparent distress sitting up in chair  [X] Patient left in no apparent distress in bed  [ ] Call bell left within reach  [ ] Nursing notified  [ ] Caregiver present  [ ] Bed alarm activated      COMMUNICATION/COLLABORATION:   The patients plan of care was discussed with: Physical Therapist     Chelsey Tubbs PTA   Time Calculation: 23 mins

## 2017-04-25 NOTE — PROGRESS NOTES
8:32 PM  VORB for heating pad received from Dr. Violeta Duque for her neck and back pain. 9:32 PM  Heating pad applied to pt back and neck. 10:14 PM  Heating removed. Pt states she is more comfortable and ready to go to sleep. 11:30 PM   Bedside shift change report given to Wilberto Mccarthy (oncoming nurse) by Lela Taylor (offgoing nurse). Report included the following information SBAR and Recent Results.

## 2017-04-25 NOTE — PROGRESS NOTES
[facility] Pharmacy Dosing Services: 4/25/17    Consult for Warfarin Dosing by Pharmacy by Dr. Marybeth Hernandez provided for this 80 y.o. female , for indication of Atrial Fibrillation. Day of Therapy : resumed - home dose 5 mg MWF, 2.5 mg TTSS - supra therapeutic on admission  Dose to achieve an INR goal of 2-3     Order entered for  Warfarin  2 (mg) ordered to be given today at 18:00. Significant drug interactions: levofloxacin, metronidazole  Previous dose given 1 mg   PT/INR Lab Results   Component Value Date/Time    INR 3.0 04/25/2017 03:57 AM      Platelets Lab Results   Component Value Date/Time    PLATELET 258 33/01/0204 03:57 AM      H/H Lab Results   Component Value Date/Time    HGB 10.2 04/25/2017 03:57 AM        Pharmacy to follow daily and will provide subsequent Warfarin dosing based on clinical status.   Shayy Heaton [facility])  Contact information [phone]

## 2017-04-25 NOTE — PROGRESS NOTES
Nutrition Assessment:    RECOMMENDATIONS/INTERVENTION(S):     RD added mechanical soft to diet  Encourage intake of Glucerna TID   Monitor PO, supplement tolerance, BG, renal labs - adjust diet/ONS accordingly    ASSESSMENT:   4/25: Pt seen for f/u. Visited around lunchtime, tray at bedside. States she has a fair appetite, \"I need help with meals. \"  Spoke with nurse who reports pt can do more for herself and needs encouragement. She has not eaten too well today, no Glucerna. I encouraged pt to try to eat and feed self. Stated she would try. POC tests 107-187 mg/dl. 4/21:  Pt seen for LOS. 80 yof admitted for enteritis. Pmhx includes DM, CKD stage III, high cholesterol, Afib, HTN. GI & surgery following for enteritis. No further testing. Obese per BMI per age. No recent wt loss. Eating well PTA. Pt reports poor appetite. Observed <25% po of lunch meal.  Has tried Glucerna at previous hospital stay, agreeable to accept until appetite improves. Pt reports MD concerned about low back pain/problem. MD notes plans for d/c to [facility] once medically stable. Labs/meds reviewed. -[phone]; A1C 6.6.  BUN, Cr, Na elevated. Skin: thin, @ risk for PI 2/2 poor nutrition. SUBJECTIVE/OBJECTIVE:     Diet Order: Consistent carb  % Eaten:  No data found. Pertinent Medications: [x] Reviewed - dulcolax, ferrous sulfate, SSI, Lopressor, Flagyl, Protonix    Chemistries:  Lab Results   Component Value Date/Time    Sodium 144 04/25/2017 03:57 AM    Potassium 3.6 04/25/2017 03:57 AM    Chloride 107 04/25/2017 03:57 AM    CO2 25 04/25/2017 03:57 AM    Anion gap 12 04/25/2017 03:57 AM    Glucose 122 04/25/2017 03:57 AM    BUN 32 04/25/2017 03:57 AM    Creatinine 1.73 04/25/2017 03:57 AM    BUN/Creatinine ratio 18 04/25/2017 03:57 AM    GFR est AA 33 04/25/2017 03:57 AM    GFR est non-AA 27 04/25/2017 03:57 AM    Calcium 8.2 04/25/2017 03:57 AM    AST (SGOT) 20 04/16/2017 05:24 PM    Alk.  phosphatase 55 04/16/2017 05:24 PM    Protein, total 7.1 04/16/2017 05:24 PM    Albumin 3.2 04/16/2017 05:24 PM    Globulin 3.9 04/16/2017 05:24 PM    A-G Ratio 0.8 04/16/2017 05:24 PM    ALT (SGPT) 20 04/16/2017 05:24 PM      Anthropometrics: Height: 5' 3\" (160 cm) Weight: 85.2 kg (187 lb 13.3 oz)    IBW (%IBW): 57.5 kg (126 lb 12.2 oz) (148.17 %) UBW (%UBW): 78.9 kg (174 lb) (107.95 %)    BMI: Body mass index is 33.27 kg/(m^2). This BMI is indicative of:   [] Underweight    [] Normal    [] Overweight    [x]  Obesity    []  Extreme Obesity (BMI>40)  Estimated Nutrition Needs (Based on): 1593 Kcals/day (RMR (1226) x 1.3 AF ) , 76 g (-93  (0.9-1.1 g/kg x actual body wt)) Protein  Carbohydrate:  At Least 130 g/day  Fluids: 1600 mL/day    Last BM: 4/24   [x]Active     []Hyperactive  []Hypoactive       [] Absent   BS  Skin:    [] Intact   [] Incision  [] Breakdown   [] DTI   [] Tears/Excoriation/Abrasion  []Edema [x] Other:thin   Wt Readings from Last 30 Encounters:   04/21/17 85.2 kg (187 lb 13.3 oz)   05/27/16 80.6 kg (177 lb 9.6 oz)   12/09/15 78.5 kg (173 lb)   11/20/15 74.4 kg (164 lb)   10/31/15 74.8 kg (164 lb 14.4 oz)   10/16/15 76.2 kg (168 lb 1.6 oz)      NUTRITION DIAGNOSES:   Problem:  Inadequate protein-energy intake     Etiology: related to conditions associated with a dx: enteritis     Signs/Symptoms: as evidenced by reports of poor appetite and observed poor po of meals      NUTRITION INTERVENTIONS:                General, healthful diet; commercial supplement     GOAL:   Pt will tolerate Mechanical soft diet with increased po intake to45% within 2-4 days    Cultural, Anabaptist, or Ethnic Dietary Needs:       LEARNING NEEDS (Diet, Food/Nutrient-Drug Interaction):    [x] None Identified   [] Identified and Education Provided/Documented   [] Identified and Pt declined/was not appropriate      [] Interdisciplinary Care Plan Reviewed/Documented    [x] Discharge Needs:   See D/C note   [] No Nutrition Related Discharge Needs    NUTRITION RISK:   Pt Is At Nutrition Risk  [x]     No Nutrition Risk Identified  []       PT SEEN FOR:    []  MD Consult: []Calorie Count      []Diabetic Diet Education        []Diet Education     []Electrolyte Management     []General Nutrition Management and Supplements     []Management of Tube Feeding     []TPN Recommendations    []  RN Referral:  []MST score >=2     []Enteral/Parenteral Nutrition PTA     []Pregnant: Gestational DM or Multigestation                 [] Pressure Ulcer    []  Low BMI      [x]  Length of Stay       [] Dysphagia Diet         [] Ventilator      Follow-up 4/25/17      Abraham German RD

## 2017-04-25 NOTE — PROGRESS NOTES
4/25/2017 1:39 PM Spoke with [facility] liaison who confirmed they will accept pt when medically stable. CM will follow up.     4/25/2017 11:42 AM Called and lvm with [facility] liaison regarding acceptance. CM will follow up with [facility] liaison to confirm they are still accepting pt when medically stable.  RADHA Rubin

## 2017-04-25 NOTE — PROGRESS NOTES
Bedside and Verbal shift change report given to Anand Davis RN (oncoming nurse) by Stew Quintana RN (offgoing nurse). Report included the following information SBAR, Kardex, Procedure Summary, Intake/Output, MAR, Recent Results and Cardiac Rhythm Sinus Arrthymia.

## 2017-04-25 NOTE — PROGRESS NOTES
Medical Progress Note      NAME: Berta Ross   :  1924  MRM:  [id number]    Date/Time: 2017  8:00 AM       Assessment / Plan:     MAKAYLA on CKD (chronic kidney disease) stage 3, GFR 30-59 ml/min (2017):  Creatinine elevated, likely from diuresis. -- hold lasix   -- monitor creat      Enteritis (2017):  Evaluated by general surgery and GI. Abdomen remains distended, but nontender. Repeat CT with resolving enteritis and no obstruction. May be contributed by constipation. -- Continue Levofloxacin and metronidazole, day 8   -- continue bowel regimen      Pleural effusion / Hypoxia / Atelectasis:  Moderate effusion on right by CT. Remains on oxygen. Suspect a component of volume overload. Was on lasix at home BID. Echo with EF 65% with grade 1 diastolic dysfunction. Repeat CXR with worsened right sided atelectasis. -- daily weight   -- hold lasix due to VALENCIA   -- continue IS   -- OOB today    Chronic A-fib ([facility] (2017):  HR controlled. INR at goal.   -- continue metoprolol tartrate   -- pharmacy to dose coumadin     Acute confusion / Acute encephalopathy in setting of enteritis:  Resolved. May have been from acute illness, hospitalization, change in environment. Head CT scan done  was unremarkable. Improved with avoiding sedating medications. Acute conjuctivitis:  Resolved.      HTN (hypertension) (10/8/2015): BP improved with metoprolol tartrate. 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 -- Continue diabetic diet and SSI alone      High cholesterol (10/8/2015):   -- continue Simvastatin      STEPHANI (obstructive sleep apnea) (2017):  Declines CPAP use.      UTI (2017): Urine Cx with Klebsiella pneumoniae. -- finish course of levaquin     Neck pain:  Resolved.     Leukocytosis:  Slight trend upwards, but improved this AM.   -- monitor       Subjective:     Chief Complaint:  Feels better today.    Had some dizziness with moving in bed. No n/v. Denies sob. ROS:  (bold if positive, if negative)              Objective:       Vitals:          Last 24hrs VS reviewed since prior progress note. Most recent are:    Visit Vitals    /45    Pulse 83    Temp 97.6 °F (36.4 °C)    Resp 20    Ht 5' 3\" (1.6 m)    Wt 85.2 kg (187 lb 13.3 oz)    SpO2 97%    Breastfeeding No    BMI 33.27 kg/m2     SpO2 Readings from Last 6 Encounters:   04/25/17 97%   12/22/16 97%   05/27/16 95%   12/09/15 100%   11/20/15 96%   10/16/15 94%    O2 Flow Rate (L/min): 1 l/min       Intake/Output Summary (Last 24 hours) at 04/25/17 0800  Last data filed at 04/25/17 1624   Gross per 24 hour   Intake                0 ml   Output             1450 ml   Net            -1450 ml          Exam:     Physical Exam:    Gen:  Well-developed, well-nourished, in no acute distress  HEENT:  Pink conjunctivae, hearing intact to voice, moist mucous membranes  Resp:  No accessory muscle use, clear breath sounds without wheezes rales or rhonchi, decreased BS right base  Card:  No murmurs, normal S1, S2 without thrills or peripheral edema  Abd:  Soft, non-tender, distended, normoactive bowel sounds are present  Musc:  No cyanosis  Neuro:   Face symmetric, tongue midline, speech fluent,  strength is 5/5 bilaterally, follows commands appropriately  Psych:  Improved insight, alert     Telemetry reviewed:   AFIB    Medications Reviewed: (see below)    Lab Data Reviewed: (see below)    ______________________________________________________________________    Medications:     Current Facility-Administered Medications   Medication Dose Route Frequency    metoprolol tartrate (LOPRESSOR) tablet 25 mg  25 mg Oral BID    senna-docusate (PERICOLACE) 8.6-50 mg per tablet 1 Tab  1 Tab Oral BID    polyethylene glycol (MIRALAX) packet 17 g  17 g Oral BID    bisacodyl (DULCOLAX) suppository 10 mg  10 mg Rectal DAILY PRN    ferrous sulfate tablet 325 mg  1 Tab Oral DAILY WITH BREAKFAST    metroNIDAZOLE (FLAGYL) tablet 500 mg  500 mg Oral Q6H    levoFLOXacin (LEVAQUIN) tablet 750 mg  750 mg Oral Q48H    Warfarin - Pharmacy to dose   Other Rx Dosing/Monitoring    sodium chloride (NS) flush 5-10 mL  5-10 mL IntraVENous PRN    acetaminophen (TYLENOL) tablet 650 mg  650 mg Oral Q4H PRN    insulin lispro (HUMALOG) injection   SubCUTAneous AC&HS    glucose chewable tablet 16 g  4 Tab Oral PRN    dextrose (D50W) injection syrg 12.5-25 g  12.5-25 g IntraVENous PRN    glucagon (GLUCAGEN) injection 1 mg  1 mg IntraMUSCular PRN    febuxostat (ULORIC) tablet 40 mg  40 mg Oral DAILY    pravastatin (PRAVACHOL) tablet 40 mg  40 mg Oral QHS    pantoprazole (PROTONIX) tablet 40 mg  40 mg Oral DAILY            Lab Review:     Recent Labs      04/25/17 0357 04/24/17 0128  04/23/17   0333   WBC  11.5*  13.0*  12.5*   HGB  10.2*  10.5*  10.4*   HCT  31.1*  31.9*  32.7*   PLT  360  323  316     Recent Labs      04/25/17 0357 04/24/17 0128  04/23/17   0333   NA  144  144  146*   K  3.6  3.8  4.0   CL  107  108  110*   CO2  25  23  24   GLU  122*  127*  121*   BUN  32*  23*  16   CREA  1.73*  1.46*  1.29*   CA  8.2*  8.5  8.6   MG  1.7   --    --    INR  3.0*  3.4*  3.5*     Lab Results   Component Value Date/Time    Glucose (POC) 159 04/24/2017 09:02 PM    Glucose (POC) 106 04/24/2017 04:21 PM    Glucose (POC) 152 04/24/2017 11:26 AM    Glucose (POC) 102 04/24/2017 07:59 AM    Glucose (POC) 106 04/23/2017 09:31 PM         Total time spent with patient: 25 Minutes                  Care Plan discussed with: Patient and Nursing Staff    Discussed:  Care Plan    Prophylaxis:  Coumadin    Disposition:  [facility]hab           ___________________________________________________    Attending Physician: Moi Lino MD

## 2017-04-25 NOTE — PROGRESS NOTES
[facility] PROGRESS NOTE  Pulmonary, Critical Care, and Sleep Medicine    Name: Rosalina Lee MRN: [id number]   : 1924 Hospital: [address] [facility]   Date: 2017  Admission Date: 2017     Chart and notes reviewed. Data reviewed. I review the patient's current medications in the medical record at each encounter.  I have evaluated and examined the patient. .     Overnight events reviewed:  No acute events overnight  Afebrile and hemodynamically stable  O2 sats 94% on RA  Fluid balance: -1450 with diuresis, but no record of intake  WBC 11.5, better  K 3.6  Creatinine 1.73: up from yesterday  Pro-BNP 3434  Mag 1.7    ROS:  Feeling somewhat better. Able to get to bedside commode today, which she reports was \" a little rough. \"  Denies further symptoms. Thinks her breathing is a little better. Has only used IS once today. Vital Signs:  Visit Vitals    /55 (BP 1 Location: Right arm, BP Patient Position: Post activity; Sitting)    Pulse 93    Temp 97.9 °F (36.6 °C)    Resp 24    Ht 5' 3\" (1.6 m)    Wt 85.2 kg (187 lb 13.3 oz)    SpO2 93%    Breastfeeding No    BMI 33.27 kg/m2       O2 Device: Room air   O2 Flow Rate (L/min): 1 l/min   Temp (24hrs), Av.9 °F (36.6 °C), Min:97.5 °F (36.4 °C), Max:98.9 °F (37.2 °C)       Intake/Output:   Last shift:         Last 3 shifts:  1901 -  0700  In: -   Out: 1450 [Urine:1450]    Intake/Output Summary (Last 24 hours) at 17 1435  Last data filed at 17 2376   Gross per 24 hour   Intake                0 ml   Output              450 ml   Net             -450 ml        Telemetry:     Physical Exam:   General:  Alert, cooperative, no distress, appears stated age. Head:  Normocephalic, without obvious abnormality, atraumatic. Eyes:  Conjunctivae/corneas clear. Nose: Nares normal. Septum midline.  Mucosa normal.   Throat: Lips, mucosa, and tongue normal.   Neck: Supple, symmetrical, trachea midline, no adenopathy   Lungs:   Quiet bs throughout   Chest wall:  No tenderness or deformity. Heart:  Regular rate and rhythm, S1, S2 normal, no murmur, click, rub or gallop. Abdomen:   Soft, non-tender. Bowel sounds normal.    Extremities: Extremities normal, atraumatic, no cyanosis or edema. Pulses: 2+ and symmetric all extremities.    Skin: Skin color, texture, turgor normal. No rashes or lesions   Lymph nodes: Cervical nodes normal.   Neurologic: Grossly nonfocal     DATA:  MAR reviewed and pertinent medications noted or modified as needed    Labs:  Recent Labs      04/25/17 0357 04/24/17 0128 04/23/17   0333   WBC  11.5*  13.0*  12.5*   HGB  10.2*  10.5*  10.4*   HCT  31.1*  31.9*  32.7*   PLT  360  323  316     Recent Labs      04/25/17 0357 04/24/17 0128 04/23/17   0333   NA  144  144  146*   K  3.6  3.8  4.0   CL  107  108  110*   CO2  25  23  24   GLU  122*  127*  121*   BUN  32*  23*  16   CREA  1.73*  1.46*  1.29*   CA  8.2*  8.5  8.6   MG  1.7   --    --    INR  3.0*  3.4*  3.5*       Imaging:  I have personally reviewed the patients radiographs and reports.       IMPRESSION  · Dyspnea; improved  · Abnormal CXR: with small right pleural effusion and RLL atelectasis  · Enteritis  · Atrial Fibrillation  · CKD Stage 3  · STEPHANI; not treated     PLAN  · Supplemental O2 to keep sats >90%  · Encourage IS and acapella use  · Continue Levaquin and Flagyl per primary team  · Add Mucinex  · Needs to mobilize with PT/OT  · CXR tomorrow AM  · GI Prophylaxis: Protonix  · DVT Prophylaxis: AC with Coumadin    Discussed with nursing      Brenda Gallo NP

## 2017-04-25 NOTE — PROGRESS NOTES
Bedside and Verbal shift change report given to Eve De La Cruz (oncoming nurse) by Germania Valdes (offgoing nurse). Report included the following information SBAR, Kardex and Recent Results.

## 2017-04-26 ENCOUNTER — HOSPITAL ENCOUNTER (OUTPATIENT)
Age: 82
Discharge: HOME | End: 2017-05-16
Attending: PHYSICAL MEDICINE & REHABILITATION | Admitting: PHYSICAL MEDICINE & REHABILITATION

## 2017-04-26 VITALS
HEART RATE: 103 BPM | SYSTOLIC BLOOD PRESSURE: 133 MMHG | WEIGHT: 187.83 LBS | HEIGHT: 63 IN | OXYGEN SATURATION: 96 % | RESPIRATION RATE: 19 BRPM | TEMPERATURE: 98.5 F | BODY MASS INDEX: 33.28 KG/M2 | DIASTOLIC BLOOD PRESSURE: 88 MMHG

## 2017-04-26 PROBLEM — R79.89 ELEVATED LACTIC ACID LEVEL: Status: RESOLVED | Noted: 2017-04-16 | Resolved: 2017-04-26

## 2017-04-26 PROBLEM — R82.81 PYURIA: Status: RESOLVED | Noted: 2017-04-17 | Resolved: 2017-04-26

## 2017-04-26 PROBLEM — R65.10 SIRS (SYSTEMIC INFLAMMATORY RESPONSE SYNDROME) (HCC): Status: RESOLVED | Noted: 2017-04-16 | Resolved: 2017-04-26

## 2017-04-26 PROBLEM — D68.9 COAGULOPATHY (HCC): Status: RESOLVED | Noted: 2017-04-16 | Resolved: 2017-04-26

## 2017-04-26 PROBLEM — N17.9 ARF (ACUTE RENAL FAILURE) (HCC): Status: RESOLVED | Noted: 2017-04-16 | Resolved: 2017-04-26

## 2017-04-26 LAB
APPEARANCE UR: ABNORMAL
BACTERIA URNS QL MICRO: NEGATIVE /HPF
BILIRUB UR QL: NEGATIVE
COLOR UR: ABNORMAL
EPITH CASTS URNS QL MICRO: ABNORMAL /LPF
GLUCOSE BLD STRIP.AUTO-MCNC: 124 MG/DL (ref 65–100)
GLUCOSE BLD STRIP.AUTO-MCNC: 137 MG/DL (ref 65–100)
GLUCOSE BLD STRIP.AUTO-MCNC: 138 MG/DL (ref 65–100)
GLUCOSE UR STRIP.AUTO-MCNC: NEGATIVE MG/DL
HGB UR QL STRIP: NEGATIVE
HYALINE CASTS URNS QL MICRO: ABNORMAL /LPF (ref 0–5)
INR PPP: 3.2 (ref 0.9–1.1)
KETONES UR QL STRIP.AUTO: NEGATIVE MG/DL
LEUKOCYTE ESTERASE UR QL STRIP.AUTO: ABNORMAL
NITRITE UR QL STRIP.AUTO: POSITIVE
PH UR STRIP: 5.5 [PH] (ref 5–8)
PROT UR STRIP-MCNC: NEGATIVE MG/DL
PROTHROMBIN TIME: 33.4 SEC (ref 9–11.1)
RBC #/AREA URNS HPF: ABNORMAL /HPF (ref 0–5)
SERVICE CMNT-IMP: ABNORMAL
SP GR UR REFRACTOMETRY: 1.02 (ref 1–1.03)
UROBILINOGEN UR QL STRIP.AUTO: 0.2 EU/DL (ref 0.2–1)
WBC URNS QL MICRO: ABNORMAL /HPF (ref 0–4)

## 2017-04-26 PROCEDURE — 82962 GLUCOSE BLOOD TEST: CPT

## 2017-04-26 PROCEDURE — 74011636637 HC RX REV CODE- 636/637: Performed by: PHYSICAL MEDICINE & REHABILITATION

## 2017-04-26 PROCEDURE — 74011250637 HC RX REV CODE- 250/637: Performed by: INTERNAL MEDICINE

## 2017-04-26 PROCEDURE — 85610 PROTHROMBIN TIME: CPT | Performed by: INTERNAL MEDICINE

## 2017-04-26 PROCEDURE — 74011250637 HC RX REV CODE- 250/637: Performed by: PHYSICAL MEDICINE & REHABILITATION

## 2017-04-26 PROCEDURE — 36415 COLL VENOUS BLD VENIPUNCTURE: CPT | Performed by: INTERNAL MEDICINE

## 2017-04-26 PROCEDURE — 97530 THERAPEUTIC ACTIVITIES: CPT

## 2017-04-26 PROCEDURE — 81001 URINALYSIS AUTO W/SCOPE: CPT | Performed by: PHYSICAL MEDICINE & REHABILITATION

## 2017-04-26 PROCEDURE — 74011250637 HC RX REV CODE- 250/637: Performed by: PHYSICIAN ASSISTANT

## 2017-04-26 PROCEDURE — 97535 SELF CARE MNGMENT TRAINING: CPT

## 2017-04-26 PROCEDURE — 87086 URINE CULTURE/COLONY COUNT: CPT | Performed by: PHYSICAL MEDICINE & REHABILITATION

## 2017-04-26 RX ORDER — AMOXICILLIN 250 MG
1 CAPSULE ORAL 2 TIMES DAILY
Status: SHIPPED | COMMUNITY
Start: 2017-04-26

## 2017-04-26 RX ORDER — FACIAL-BODY WIPES
10 EACH TOPICAL DAILY PRN
Status: DISCONTINUED | OUTPATIENT
Start: 2017-04-26 | End: 2017-05-16 | Stop reason: HOSPADM

## 2017-04-26 RX ORDER — ONDANSETRON 4 MG/1
4 TABLET, ORALLY DISINTEGRATING ORAL
Status: DISCONTINUED | OUTPATIENT
Start: 2017-04-26 | End: 2017-05-16 | Stop reason: HOSPADM

## 2017-04-26 RX ORDER — DOCUSATE SODIUM 100 MG/1
100 CAPSULE, LIQUID FILLED ORAL 2 TIMES DAILY
Status: DISCONTINUED | OUTPATIENT
Start: 2017-04-26 | End: 2017-05-16 | Stop reason: HOSPADM

## 2017-04-26 RX ORDER — POLYETHYLENE GLYCOL 3350 17 G/17G
17 POWDER, FOR SOLUTION ORAL DAILY
Status: DISCONTINUED | OUTPATIENT
Start: 2017-04-27 | End: 2017-05-16 | Stop reason: HOSPADM

## 2017-04-26 RX ORDER — LEVOFLOXACIN 750 MG/1
750 TABLET ORAL
Status: SHIPPED | COMMUNITY
Start: 2017-04-26 | End: 2017-06-26 | Stop reason: ALTCHOICE

## 2017-04-26 RX ORDER — DEXTROSE 50 % IN WATER (D50W) INTRAVENOUS SYRINGE
25 AS NEEDED
Status: DISCONTINUED | OUTPATIENT
Start: 2017-04-26 | End: 2017-05-16 | Stop reason: HOSPADM

## 2017-04-26 RX ORDER — WARFARIN 1 MG/1
2 TABLET ORAL
Status: SHIPPED | COMMUNITY
Start: 2017-04-26

## 2017-04-26 RX ORDER — FEBUXOSTAT 40 MG/1
40 TABLET, FILM COATED ORAL DAILY
Status: DISCONTINUED | OUTPATIENT
Start: 2017-04-27 | End: 2017-05-16 | Stop reason: HOSPADM

## 2017-04-26 RX ORDER — DEXTROSE MONOHYDRATE 25 G/50ML
12.5-25 INJECTION, SOLUTION INTRAVENOUS AS NEEDED
Status: DISCONTINUED | OUTPATIENT
Start: 2017-04-26 | End: 2017-04-26 | Stop reason: HOSPADM

## 2017-04-26 RX ORDER — PRAVASTATIN SODIUM 20 MG/1
40 TABLET ORAL
Status: DISCONTINUED | OUTPATIENT
Start: 2017-04-26 | End: 2017-05-16 | Stop reason: HOSPADM

## 2017-04-26 RX ORDER — METOPROLOL TARTRATE 25 MG/1
25 TABLET, FILM COATED ORAL 2 TIMES DAILY
Status: SHIPPED | COMMUNITY
Start: 2017-04-26

## 2017-04-26 RX ORDER — METRONIDAZOLE 250 MG/1
500 TABLET ORAL EVERY 6 HOURS
Status: DISCONTINUED | OUTPATIENT
Start: 2017-04-27 | End: 2017-04-30

## 2017-04-26 RX ORDER — ADHESIVE BANDAGE
30 BANDAGE TOPICAL DAILY PRN
Status: DISCONTINUED | OUTPATIENT
Start: 2017-04-26 | End: 2017-05-16 | Stop reason: HOSPADM

## 2017-04-26 RX ORDER — ACETAMINOPHEN 325 MG/1
650 TABLET ORAL
Status: DISCONTINUED | OUTPATIENT
Start: 2017-04-26 | End: 2017-05-16 | Stop reason: HOSPADM

## 2017-04-26 RX ORDER — POLYETHYLENE GLYCOL 3350 17 G/17G
17 POWDER, FOR SOLUTION ORAL
Status: SHIPPED | COMMUNITY
Start: 2017-04-26

## 2017-04-26 RX ORDER — MAGNESIUM SULFATE 100 %
16 CRYSTALS MISCELLANEOUS AS NEEDED
Status: DISCONTINUED | OUTPATIENT
Start: 2017-04-26 | End: 2017-05-16 | Stop reason: HOSPADM

## 2017-04-26 RX ORDER — PANTOPRAZOLE SODIUM 40 MG/1
40 TABLET, DELAYED RELEASE ORAL
Status: DISCONTINUED | OUTPATIENT
Start: 2017-04-27 | End: 2017-05-16 | Stop reason: HOSPADM

## 2017-04-26 RX ORDER — LANOLIN ALCOHOL/MO/W.PET/CERES
1 CREAM (GRAM) TOPICAL
Status: DISCONTINUED | OUTPATIENT
Start: 2017-04-27 | End: 2017-05-16 | Stop reason: HOSPADM

## 2017-04-26 RX ORDER — AMOXICILLIN 250 MG
1 CAPSULE ORAL DAILY
Status: DISCONTINUED | OUTPATIENT
Start: 2017-04-27 | End: 2017-05-16 | Stop reason: HOSPADM

## 2017-04-26 RX ORDER — INSULIN LISPRO 100 [IU]/ML
INJECTION, SOLUTION INTRAVENOUS; SUBCUTANEOUS
Status: DISCONTINUED | OUTPATIENT
Start: 2017-04-26 | End: 2017-05-16 | Stop reason: HOSPADM

## 2017-04-26 RX ORDER — METRONIDAZOLE 500 MG/1
500 TABLET ORAL EVERY 6 HOURS
Qty: 32 TAB | Refills: 0 | Status: SHIPPED | OUTPATIENT
Start: 2017-04-26 | End: 2017-05-04

## 2017-04-26 RX ORDER — METOPROLOL TARTRATE 25 MG/1
25 TABLET, FILM COATED ORAL 2 TIMES DAILY
Status: DISCONTINUED | OUTPATIENT
Start: 2017-04-26 | End: 2017-05-16 | Stop reason: HOSPADM

## 2017-04-26 RX ADMIN — METOPROLOL TARTRATE 25 MG: 25 TABLET ORAL at 21:36

## 2017-04-26 RX ADMIN — METOPROLOL TARTRATE 25 MG: 25 TABLET ORAL at 08:46

## 2017-04-26 RX ADMIN — LEVOFLOXACIN 750 MG: 500 TABLET, FILM COATED ORAL at 18:49

## 2017-04-26 RX ADMIN — PRAVASTATIN SODIUM 40 MG: 20 TABLET ORAL at 21:36

## 2017-04-26 RX ADMIN — ACETAMINOPHEN 650 MG: 325 TABLET ORAL at 21:35

## 2017-04-26 RX ADMIN — FEBUXOSTAT 40 MG: 40 TABLET ORAL at 08:46

## 2017-04-26 RX ADMIN — DOCUSATE SODIUM 100 MG: 100 CAPSULE ORAL at 21:36

## 2017-04-26 RX ADMIN — Medication 325 MG: at 08:45

## 2017-04-26 RX ADMIN — INSULIN LISPRO 2 UNITS: 100 INJECTION, SOLUTION INTRAVENOUS; SUBCUTANEOUS at 21:36

## 2017-04-26 RX ADMIN — ACETAMINOPHEN 650 MG: 325 TABLET ORAL at 16:08

## 2017-04-26 RX ADMIN — METRONIDAZOLE 500 MG: 250 TABLET, FILM COATED ORAL at 08:46

## 2017-04-26 RX ADMIN — DOCUSATE SODIUM -SENNOSIDES 1 TABLET: 50; 8.6 TABLET, COATED ORAL at 08:46

## 2017-04-26 RX ADMIN — METRONIDAZOLE 500 MG: 250 TABLET, FILM COATED ORAL at 04:42

## 2017-04-26 RX ADMIN — POLYETHYLENE GLYCOL 3350 17 G: 17 POWDER, FOR SOLUTION ORAL at 08:45

## 2017-04-26 RX ADMIN — METRONIDAZOLE 500 MG: 250 TABLET, FILM COATED ORAL at 16:08

## 2017-04-26 RX ADMIN — PANTOPRAZOLE SODIUM 40 MG: 40 TABLET, DELAYED RELEASE ORAL at 08:46

## 2017-04-26 RX ADMIN — ACETAMINOPHEN 650 MG: 325 TABLET ORAL at 08:45

## 2017-04-26 NOTE — PROGRESS NOTES
[address] [place] [address]  [phone]      Medical Progress Note      NAME: Nicole Lozada   :  1924  MRM:  [id number]    Date/Time: 2017  10:46 AM         Subjective:     Chief Complaint:  Abdominal pain: resolved, POA, better with antibiotics after admission, not requiring any pain meds now    ROS:  (bold if positive, if negative)                        Tolerating PT  Tolerating Diet          Objective:       Vitals:          Last 24hrs VS reviewed since prior progress note.  Most recent are:    Visit Vitals    /58 (BP 1 Location: Left arm, BP Patient Position: At rest)    Pulse 86    Temp 97.7 °F (36.5 °C)    Resp 16    Ht 5' 3\" (1.6 m)    Wt 85.2 kg (187 lb 13.3 oz)    SpO2 94%    Breastfeeding No    BMI 33.27 kg/m2     SpO2 Readings from Last 6 Encounters:   17 94%   16 97%   16 95%   12/09/15 100%   11/20/15 96%   10/16/15 94%    O2 Flow Rate (L/min): 1 l/min     Intake/Output Summary (Last 24 hours) at 17 1046  Last data filed at 17 1400   Gross per 24 hour   Intake                0 ml   Output              500 ml   Net             -500 ml          Exam:     Physical Exam:    Gen:  Well-developed, well-nourished, elderly, in no acute distress  HEENT:  Pink conjunctivae, PERRL, hearing intact to voice, moist mucous membranes  Neck:  Supple, without masses, thyroid non-tender  Resp:  No accessory muscle use, clear breath sounds without wheezes rales or rhonchi  Card:  No murmurs, normal S1, S2 without thrills, bruits or peripheral edema  Abd:  Soft, non-tender, non-distended, normoactive bowel sounds are present, no palpable organomegaly and no detectable hernias  Lymph:  No cervical or inguinal adenopathy  Musc:  No cyanosis or clubbing  Skin:  No rashes or ulcers, skin turgor is good  Neuro:  Cranial nerves are grossly intact, no focal motor weakness, follows commands appropriately  Psych:  Fair insight, oriented to person, place and time, alert       Medications Reviewed: (see below)    Lab Data Reviewed: (see below)    ______________________________________________________________________    Medications:     Current Facility-Administered Medications   Medication Dose Route Frequency    metoprolol tartrate (LOPRESSOR) tablet 25 mg  25 mg Oral BID    senna-docusate (PERICOLACE) 8.6-50 mg per tablet 1 Tab  1 Tab Oral BID    polyethylene glycol (MIRALAX) packet 17 g  17 g Oral BID    bisacodyl (DULCOLAX) suppository 10 mg  10 mg Rectal DAILY PRN    ferrous sulfate tablet 325 mg  1 Tab Oral DAILY WITH BREAKFAST    metroNIDAZOLE (FLAGYL) tablet 500 mg  500 mg Oral Q6H    levoFLOXacin (LEVAQUIN) tablet 750 mg  750 mg Oral Q48H    Warfarin - Pharmacy to dose   Other Rx Dosing/Monitoring    sodium chloride (NS) flush 5-10 mL  5-10 mL IntraVENous PRN    acetaminophen (TYLENOL) tablet 650 mg  650 mg Oral Q4H PRN    insulin lispro (HUMALOG) injection   SubCUTAneous AC&HS    glucose chewable tablet 16 g  4 Tab Oral PRN    dextrose (D50W) injection syrg 12.5-25 g  12.5-25 g IntraVENous PRN    glucagon (GLUCAGEN) injection 1 mg  1 mg IntraMUSCular PRN    febuxostat (ULORIC) tablet 40 mg  40 mg Oral DAILY    pravastatin (PRAVACHOL) tablet 40 mg  40 mg Oral QHS    pantoprazole (PROTONIX) tablet 40 mg  40 mg Oral DAILY            Lab Review:     Recent Labs      04/25/17 0357 04/24/17 0128   WBC  11.5*  13.0*   HGB  10.2*  10.5*   HCT  31.1*  31.9*   PLT  360  323     Recent Labs      04/26/17   0437  04/25/17 0357 04/24/17 0128   NA   --   144  144   K   --   3.6  3.8   CL   --   107  108   CO2   --   25  23   GLU   --   122*  127*   BUN   --   32*  23*   CREA   --   1.73*  1.46*   CA   --   8.2*  8.5   MG   --   1.7   --    INR  3.2*  3.0*  3.4*     Lab Results   Component Value Date/Time    Glucose (POC) 124 04/26/2017 07:18 AM    Glucose (POC) 125 04/25/2017 10:02 PM    Glucose (POC) 175 04/25/2017 04:32 PM    Glucose (POC) 187 04/25/2017 12:00 PM    Glucose (POC) 107 04/25/2017 08:01 AM     No results for input(s): PH, PCO2, PO2, HCO3, FIO2 in the last 72 hours.   Recent Labs      04/26/17   0437  04/25/17   0357  04/24/17   0128   INR  3.2*  3.0*  3.4*     No results found for: SDES  Lab Results   Component Value Date/Time    Culture result: KLEBSIELLA PNEUMONIAE  (PREDOMINATING)   04/16/2017 11:16 PM    Culture result: MIXED UROGENITAL ADELFO ISOLATED 04/16/2017 11:16 PM    Culture result: ENTEROCOCCUS FAECALIS GROUP D 10/16/2015 09:20 PM            Assessment:     Principal Problem:    Enteritis (4/16/2017)    Active Problems:    HTN (hypertension) (10/8/2015)      DM (diabetes mellitus) ([address] (10/8/2015)      High cholesterol (10/8/2015)      CKD (chronic kidney disease) stage 3, GFR 30-59 ml/min (4/16/2017)      STEPHANI (obstructive sleep apnea) (4/16/2017)      A-fib ([address] (4/16/2017)      Chronic anticoagulation (4/16/2017)           Plan:     Principal Problem:    Enteritis (4/16/2017)   - resolving with antibiotics   - no surgical intervention needed   - tolerating diet   - medically stable to go to rehab, no further interventions planned    Active Problems:    HTN (hypertension) (10/8/2015)   - BP okay on meds as above      DM (diabetes mellitus) ([address] (10/8/2015)   - type 2 with renal disease   - BS okay on meds as above      High cholesterol (10/8/2015)   - continue statin      CKD (chronic kidney disease) stage 3, GFR 30-59 ml/min (4/16/2017)   - creatinine up slightly from diuresis   - diuretics stopped   - monitor renal function intermittently   - stable for discharge      STEPHANI (obstructive sleep apnea) (4/16/2017)   - refuses CPAP, O2 at night      A-fib ([address] (4/16/2017)   - continue metoprolol      Chronic anticoagulation (4/16/2017)   - on warfarin, Pharmacy protocol for INR      Total time spent with patient: 35 minutes                  Care Plan discussed with: Patient, Care Manager and Nursing Staff    Discussed:  Code Status, Care Plan and D/C Planning    Prophylaxis:  Coumadin    Disposition:  [facility]/Rehab           ___________________________________________________    Attending Physician: Amilcar Mattson MD

## 2017-04-26 NOTE — PROGRESS NOTES
4/26/2017 11:53 AM Pt going to room 415 at [facility]. Nursing please call report to [phone].     4/26/201710:56 AM [facility] accepted pt for admission today.  SERGO BrisenoW

## 2017-04-26 NOTE — PROGRESS NOTES
[facility] Pharmacy Dosing Services: 4/26/17  Consult for Warfarin Dosing by Pharmacy by Dr. Opal Larson provided for this 80 y.o. female for indication of Atrial Fibrillation. Day of Therapy: resumed from home. (PTA: Warfarin 5 mg MWF, 2.5 mg TTSS - supra therapeutic on admission)  Dose to achieve an INR goal of 2-3     Hold Warfarin tonight      Significant drug interactions: Levaquin, Flagyl  Previous dose given 2 mg yesterday   PT/INR Lab Results   Component Value Date/Time    INR 3.2 04/26/2017 04:37 AM      Platelets Lab Results   Component Value Date/Time    PLATELET 795 91/06/2401 03:57 AM      H/H Lab Results   Component Value Date/Time    HGB 10.2 04/25/2017 03:57 AM        Pharmacy to follow daily and will provide subsequent Warfarin dosing based on clinical status.   Chloe Bartholomew)  Contact information [phone]

## 2017-04-26 NOTE — DISCHARGE SUMMARY
Physician Discharge Summary     Patient ID:  Cherelle Quiñonez  [id number]  21 y.o.  4/18/1924    Admit date: 4/16/2017    Discharge date: 4/26/2017    Admission Diagnoses: Enteritis    Discharge Diagnoses:  Principal Diagnosis Enteritis                                            Principal Problem:    Enteritis (4/16/2017)    Active Problems:    HTN (hypertension) (10/8/2015)      DM (diabetes mellitus) ([facility] (10/8/2015)      High cholesterol (10/8/2015)      CKD (chronic kidney disease) stage 3, GFR 30-59 ml/min (4/16/2017)      STEPHANI (obstructive sleep apnea) (4/16/2017)      A-fib ([facility] (4/16/2017)      Chronic anticoagulation (4/16/2017)         Resolved Problems:  Problem List as of 4/26/2017  Date Reviewed: 4/16/2017          Codes Class Noted - Resolved    * (Principal)Enteritis ICD-10-CM: K52.9  ICD-9-CM: 558.9  4/16/2017 - Present        CKD (chronic kidney disease) stage 3, GFR 30-59 ml/min (Chronic) ICD-10-CM: N18.3  ICD-9-CM: 585.3  4/16/2017 - Present        STEPHANI (obstructive sleep apnea) (Chronic) ICD-10-CM: G47.33  ICD-9-CM: 327.23  4/16/2017 - Present        A-fib ([facility] (Chronic) ICD-10-CM: I48.91  ICD-9-CM: 427.31  4/16/2017 - Present        Chronic anticoagulation (Chronic) ICD-10-CM: Z79.01  ICD-9-CM: V58.61  4/16/2017 - Present        HTN (hypertension) (Chronic) ICD-10-CM: I10  ICD-9-CM: 401.9  10/8/2015 - Present        DM (diabetes mellitus) ([facility] (Chronic) ICD-10-CM: E11.9  ICD-9-CM: 250.00  10/8/2015 - Present        High cholesterol (Chronic) ICD-10-CM: E78.00  ICD-9-CM: 272.0  10/8/2015 - Present        RESOLVED: Pyuria ICD-10-CM: N39.0  ICD-9-CM: 791.9  4/17/2017 - 4/26/2017        RESOLVED: ARF (acute renal failure) ([facility] ICD-10-CM: N17.9  ICD-9-CM: 584.9  4/16/2017 - 4/26/2017        RESOLVED: SIRS (systemic inflammatory response syndrome) ([facility] ICD-10-CM: R65.10  ICD-9-CM: 995.90  4/16/2017 - 4/26/2017        RESOLVED: Elevated lactic acid level ICD-10-CM: R79.89  ICD-9-CM: 276.2 4/16/2017 - 4/26/2017        RESOLVED: Coagulopathy ([facility] ICD-10-CM: D68.9  ICD-9-CM: 286.9  4/16/2017 - 4/26/2017        RESOLVED: ARF (acute renal failure) ([facility] ICD-10-CM: N17.9  ICD-9-CM: 584.9  10/8/2015 - 4/16/2017        RESOLVED: Hyperkalemia ICD-10-CM: E87.5  ICD-9-CM: 276.7  10/8/2015 - 11/20/2015        RESOLVED: Leukocytosis ICD-10-CM: X84.866  ICD-9-CM: 288.60  10/8/2015 - 11/20/2015        RESOLVED: SIRS (systemic inflammatory response syndrome) ([facility] ICD-10-CM: R65.10  ICD-9-CM: 995.90  10/8/2015 - 11/20/2015                Hospital Course: This is an interim summary and covers the hospitalization from 4/25/2017 to 4/26/2017. For any preceding portion of the patient's hospitalization, please see my partner's notes. She continued to do well, tolerating her diet and therapy. Placement was arranged for rehab. She was discharged to [address] [place] [address] on 4/26/2017 in improved condition. PCP: Rafael Diaz MD    Consults: Pulmonary/Intensive care, GI and General Surgery    Discharge Exam:  See my Progress Note from today. Disposition: rehab    Patient Instructions:   Current Discharge Medication List      START taking these medications    Details   metoprolol tartrate (LOPRESSOR) 25 mg tablet Take 1 Tab by mouth two (2) times a day. levoFLOXacin (LEVAQUIN) 750 mg tablet Take 1 Tab by mouth every fourty-eight (48) hours. metroNIDAZOLE (FLAGYL) 500 mg tablet Take 1 Tab by mouth every six (6) hours for 8 days. Qty: 32 Tab, Refills: 0      polyethylene glycol (MIRALAX) 17 gram packet Take 1 Packet by mouth two (2) times a day. senna-docusate (PERICOLACE) 8.6-50 mg per tablet Take 1 Tab by mouth two (2) times a day. CONTINUE these medications which have CHANGED    Details   warfarin (COUMADIN) 1 mg tablet Take 1.5 Tabs by mouth daily.  Indications: PREVENT THROMBOEMBOLISM IN CHRONIC ATRIAL FIBRILLATION         CONTINUE these medications which have NOT CHANGED    Details acetaminophen (TYLENOL) 325 mg tablet Take  by mouth every four (4) hours as needed for Pain.      triamcinolone acetonide (KENALOG) 0.1 % topical cream Apply  to affected area two (2) times daily as needed for Skin Irritation. use thin layer      calcium carbonate-vitamin D3 600 mg (1,500 mg)-800 unit chew Take 1 Tab by mouth daily. nystatin (MYCOSTATIN) powder Apply  to affected area two (2) times daily as needed. febuxostat (ULORIC) 40 mg tab tablet Take 40 mg by mouth daily. Associated Diagnoses: Essential hypertension      ferrous gluconate (FERGON) 240 mg (27 mg iron) tablet Take 240 mg by mouth three (3) times daily (with meals). ascorbic acid (VITAMIN C) 500 mg tablet Take 500 mg by mouth three (3) times daily. b complex vitamins tablet Take 1 Tab by mouth daily. cholecalciferol, vitamin D3, (VITAMIN D3) 2,000 unit tab Take 1 Tab by mouth daily. folic acid 552 mcg tablet Take 400 mcg by mouth daily. omeprazole (PRILOSEC) 40 mg capsule Take 40 mg by mouth daily. fish oil-dha-epa 1,200-144-216 mg cap Take  by mouth daily. traMADol (ULTRAM) 50 mg tablet Take 50 mg by mouth every six (6) hours as needed for Pain.      trolamine salicylate (MYOFLEX) 10 % topical cream Apply  to affected area as needed. Apply to knees      pravastatin (PRAVACHOL) 40 mg tablet Take 40 mg by mouth nightly. STOP taking these medications       furosemide (LASIX) 40 mg tablet Comments:   Reason for Stopping:         metoprolol succinate (TOPROL XL) 25 mg XL tablet Comments:   Reason for Stopping:              Activity: Activity as tolerated  Diet: Cardiac Diet  Wound Care: Keep wound clean and dry    Follow-up Information     Follow up With Details Comments [address],[facility]  [phone]          35 minutes were spend on this discharge.     Signed:  Tom Barrios MD  4/26/2017  4:00 PM

## 2017-04-26 NOTE — PROGRESS NOTES
Problem: Mobility Impaired (Adult and Pediatric)  Goal: *Acute Goals and Plan of Care (Insert Text)  Physical Therapy Goals  Initiated 4/17/2017  1. Patient will move from supine to sit and sit to supine , scoot up and down and roll side to side in bed with independence within 7 day(s). 2. Patient will transfer from bed to chair and chair to bed with modified independence using the least restrictive device within 7 day(s). 3. Patient will perform sit to stand with modified independence within 7 day(s). 4. Patient will ambulate with modified independence for 200 feet with the least restrictive device within 7 day(s). PHYSICAL THERAPY TREATMENT  Patient: Tanvir King (3 y.o. female)  Date: 4/26/2017  Diagnosis: Enteritis Enteritis       Precautions:  fall, pressure injury         ASSESSMENT:  Pt presents with severe debility limiting functional mobility. Pt reports R hip discomfort which did worsen with movement. Pt is alert and opeinted to person, place and time and cooperative with therapists. This patient was ambulating with a rollator before admission, and was able to ambulate about 20 feet with moderate assist and rolling walker 4/17/2017. Today Pt needed maximal assist x2 to sit edge of bed, where she was able to sit with high guard. Pt stood with rolling walker and maximal/moderate assist x3 for about a minute, twice. This mobility is much improved on yesterday when it took a total assist to stand. Pt is incontinent of bladder and has a Pur wick in place. Pt was encouraged to always participate with therapists and advised of the benefits of increased activity. B LE exercises were done in long sitting. Pt is scheduled to transfer to [facility] later today.   Progression toward goals:  [ ]    Improving appropriately and progressing toward goals  [X]    Improving slowly and progressing toward goals  [ ]    Not making progress toward goals and plan of care will be adjusted       PLAN:  Patient continues to benefit from skilled intervention to address the above impairments. Continue treatment per established plan of care. Discharge Recommendations:  Rehab  Further Equipment Recommendations for Discharge: no cjange       SUBJECTIVE:   Patient stated I can try. I don't know if I can      OBJECTIVE DATA SUMMARY:   Critical Behavior:  Neurologic State: Alert  Orientation Level: Oriented X4  Cognition: Follows commands     Functional Mobility Training:  Bed Mobility:     Supine to Sit: Additional time;Assist x2;Maximum assistance  Sit to Supine: Assist x2; Additional time;Maximum assistance  Scooting: Total assistance        Transfers:  Sit to Stand: Additional time; Other (comment); Maximum assistance (assist x3)                                Balance:  Sitting: High guard  Standing - Static: Constant support  Standing - Dynamic : Poor  Ambulation/Gait Training:                                                                   Stairs:                       Neuro Re-Education:  Worked in sitting balance with SBA, on standing balance with rolling walker and max/mod assist  Therapeutic Exercises:   Instructed in ankle pumps and heel glides and encouraged to exercise hourly  Pain:  Pain Scale 1: Numeric (0 - 10)  Pain Intensity 1: 3  Pain Location 1: hip  Pain Orientation 1: right  Pain Description 1: Aching  Pain Intervention(s) 1: Emotional support;Repositioned  Activity Tolerance:   fair  Please refer to the flowsheet for vital signs taken during this treatment.   After treatment:   [ ]    Patient left in no apparent distress sitting up in chair  [X]    Patient left in no apparent distress in bed  [X]    Call bell left within reach  [X]    Nursing notified  [ ]    Caregiver present  [X]    Bed alarm activated      COMMUNICATION/COLLABORATION:   The patients plan of care was discussed with: Registered Nurse and Occupational Therapist     Brent Rae PT   Time Calculation: 24 mins

## 2017-04-26 NOTE — PROGRESS NOTES
Report called to Ival Round at [address] [place] [address]. SBAR sheet utilized. Opportunity for questions provided.

## 2017-04-26 NOTE — DISCHARGE INSTRUCTIONS
HOSPITALIST DISCHARGE INSTRUCTIONS  NAME: Cristian Dickerson   :  1924   MRN:  [id number]     Date/Time:  2017 3:58 PM    ADMIT DATE: 2017     DISCHARGE DATE: 2017     DISCHARGE DIAGNOSIS:  Enteritis    MEDICATIONS:  · It is important that you take the medication exactly as they are prescribed. · Keep your medication in the bottles provided by the pharmacist and keep a list of the medication names, dosages, and times to be taken in your wallet. · Do not take other medications without consulting your doctor. Pain Management: per above medications    What to do at Home    Recommended diet:  Cardiac Diet and Diabetic Diet    Recommended activity: Activity as tolerated    If you experience any of the following symptoms then please call your primary care physician or return to the emergency room if you cannot get hold of your doctor:  Fever, chills, nausea, vomiting, diarrhea, change in mentation, falling, bleeding, shortness of breath    Follow Up: Follow-up Information     Follow up With Details Comments [facility] [address]  [address] Kellie Villafuerte  [phone]            Information obtained by :  I understand that if any problems occur once I am at home I am to contact my physician. I understand and acknowledge receipt of the instructions indicated above.                                                                                                                                            [de-identified] or R.N.'s Signature                                                                  Date/Time                                                                                                                                              Patient or Representative Signature                                                          Date/Time          Nutrition Recommendations for Discharge:            Continue Oral Nutrition Supplements at discharge:   Glucerna Advance or Glucerna Shake 1-2 times per day  for 30 days unless otherwise directed by your Primary Care Physician.     Helen Brandt RD

## 2017-04-26 NOTE — PROGRESS NOTES
[facility] PROGRESS NOTE  Pulmonary, Critical Care, and Sleep Medicine    Name: Barak Mulligan MRN: [id number]   : 1924 Hospital: [address] [place] [address] Dr   Date: 2017  Admission Date: 2017     Chart and notes reviewed. Data reviewed. I review the patient's current medications in the medical record at each encounter.  I have evaluated and examined the patient. .     Overnight events reviewed:  No acute events overnight  Afebrile and hemodynamically stable  O2 sats 96% on RA  Fluid balance: -500but no record of intake  No labs to review today    ROS:  Feeling somewhat better. Got up twice today with therapy and has improved somewhat. Still very weak. Feels that breathing has improved. Otherwise does not offer me any complaints. Vital Signs:  Visit Vitals    /63 (BP 1 Location: Left arm, BP Patient Position: At rest)    Pulse 82    Temp 98.2 °F (36.8 °C)    Resp 16    Ht 5' 3\" (1.6 m)    Wt 85.2 kg (187 lb 13.3 oz)    SpO2 96%    Breastfeeding No    BMI 33.27 kg/m2       O2 Device: Room air   O2 Flow Rate (L/min): 1 l/min   Temp (24hrs), Av.9 °F (36.6 °C), Min:97.5 °F (36.4 °C), Max:98.2 °F (36.8 °C)       Intake/Output:   Last shift:         Last 3 shifts:  1901 -  0700  In: -   Out: 950 [Urine:950]  No intake or output data in the 24 hours ending 17 1521     Telemetry:     Physical Exam:   General:  Alert, cooperative, no distress, appears stated age. Head:  Normocephalic, without obvious abnormality, atraumatic. Eyes:  Conjunctivae/corneas clear. Nose: Nares normal. Septum midline. Mucosa normal.   Throat: Lips, mucosa, and tongue normal.   Neck: Supple, symmetrical, trachea midline, no adenopathy   Lungs:   Quiet bs throughout   Chest wall:  No tenderness or deformity. Heart:  Regular rate and rhythm, S1, S2 normal, no murmur, click, rub or gallop. Abdomen:   Soft, non-tender.  Bowel sounds normal.    Extremities: Extremities normal, atraumatic, no cyanosis or edema. Pulses: 2+ and symmetric all extremities. Skin: Skin color, texture, turgor normal. No rashes or lesions   Lymph nodes: Cervical nodes normal.   Neurologic: Grossly nonfocal     DATA:  MAR reviewed and pertinent medications noted or modified as needed    Labs:  Recent Labs      04/25/17   0357  04/24/17 0128   WBC  11.5*  13.0*   HGB  10.2*  10.5*   HCT  31.1*  31.9*   PLT  360  323     Recent Labs      04/26/17   0437  04/25/17   0357  04/24/17   0128   NA   --   144  144   K   --   3.6  3.8   CL   --   107  108   CO2   --   25  23   GLU   --   122*  127*   BUN   --   32*  23*   CREA   --   1.73*  1.46*   CA   --   8.2*  8.5   MG   --   1.7   --    INR  3.2*  3.0*  3.4*       Imaging:  I have personally reviewed the patients radiographs and reports. CXR 3:25:  Bibasilar atelectasis with small pleural effusions       IMPRESSION  · Dyspnea; improved  · Abnormal CXR: with small right pleural effusion and RLL atelectasis  · Enteritis  · Atrial Fibrillation  · CKD Stage 3  · STEPHANI; not treated     PLAN  · Supplemental O2 to keep sats >90%  · Encourage IS and acapella use  · Continue Levaquin and Flagyl per primary team  · Continue  · Needs to mobilize with PT/OT  · GI Prophylaxis: Protonix  · DVT Prophylaxis: AC with Coumadin    Ms. Janine Leigh is stable from a pulmonary perspective. We will sign off and be available as needed for questions or concerns. Please call with questions.       Shubham Brown NP

## 2017-04-26 NOTE — PROGRESS NOTES
Problem: Self Care Deficits Care Plan (Adult)  Goal: *Acute Goals and Plan of Care (Insert Text)  Occupational Therapy Goals  Initiated 4/18/2017  1. Patient will perform grooming in standing at sink x 5 mins with modified indep within 7 day(s). 2. Patient will perform lower body dressing with modified indep within 7 day(s). 3. Patient will perform toilet transfers with modified indep within 7 day(s). 4. Patient will perform all aspects of toileting with modified indep within 7 day(s). 5. Patient will utilize energy conservation techniques during functional activities with verbal cues within 7 day(s). Re-assessed 4/26/17 Goals adjusted    1. Patient will perform grooming in seated position with set-up within 7 day(s). 2. Patient will perform lower body dressing with min assist within 7 day(s). 3. Patient will perform toilet transfers with min assist within 7 day(s). 4. Patient will perform all aspects of toileting with min assist within 7 day(s). 5. Patient will utilize energy conservation techniques during functional activities with verbal cues within 7 day(s). OCCUPATIONAL THERAPY TREATMENT: WEEKLY REASSESSMENT  Patient: Stephanie Bell (49 y.o. female)  Date: 4/26/2017  Diagnosis: Enteritis Enteritis       Precautions:  fall      ASSESSMENT:  Patient received  Alert and receptive to therapy, responds well to visual and tactile cuing. Patient requires mod assist for bed mobility and scooting. Once at EOB patient tolerates sitting unsupported for ten minutes, completes grooming and bathing tasks with set up. Patient completes self feeding with tray in position seated in bed. Patient is alert and oriented, appears to understand pending discharge to rehab. Patient goals adjusted as not met in past week, patient  will be challenged in inpatient setting.   Progression toward goals:  [ ]            Improving appropriately and progressing toward goals  [ ]            Improving slowly and progressing toward goals  [X]            Not making progress toward goals and plan of care will be adjusted       PLAN:  Goals have been updated based on progression since last assessment. Patient continues to benefit from skilled intervention to address the above impairments. Continue to follow patient 3 times a week to address goals. Planned Interventions:  [X]                    Self Care Training                  [X]             Therapeutic Activities  [X]                    Functional Mobility Training    [ ]             Cognitive Retraining  [X]                    Therapeutic Exercises           [X]             Endurance Activities  [ ]                    Balance Training                   [ ]             Neuromuscular Re-Education  [ ]                    Visual/Perceptual Training     [X]        Home Safety Training  [X]                    Patient Education                 [X]             Family Training/Education  [ ]                    Other (comment):  Discharge Recommendations: Rehab  Further Equipment Recommendations for Discharge: TBD in rehab       SUBJECTIVE:   Patient stated I really want to be able to go home.       OBJECTIVE DATA SUMMARY:   Cognitive/Behavioral Status:  Neurologic State: Alert  Orientation Level: Oriented X4                 Functional Mobility and Transfers for ADLs:  Bed Mobility:  Supine to Sit: Additional time;Assist x2;Maximum assistance  Sit to Supine: Assist x2; Additional time;Maximum assistance  Scooting: Total assistance     Transfers:  Sit to Stand: Additional time; Other (comment); Maximum assistance (assist x3)        Balance:  Sitting: High guard  Standing - Static: Constant support  Standing - Dynamic : Poor     ADL Intervention:    Patient instructed and indicated understanding the benefits of maintaining activity tolerance, functional mobility, and independence with self care tasks during acute stay to ensure safe return home and to baseline.  Encouraged patient to increase frequency and duration OOB, be out of bed for all meals, perform daily ADLs (as approved by RN/MD regarding bathing etc), and performing functional mobility to/from bathroom with assistance. Therapeutic Exercises:   Seated UE ROM  Pain:  Pain Scale 1: Numeric (0 - 10)  Pain Intensity 1: 8  Pain Location 1: Neck  Pain Orientation 1: Left;Posterior  Pain Description 1: Aching  Pain Intervention(s) 1: Emotional support;Repositioned  Activity Tolerance:   Patient tolerates sitting EOB for 10 minutes unsupported  Please refer to the flowsheet for vital signs taken during this treatment.   After treatment:   [ ] Patient left in no apparent distress sitting up in chair  [X] Patient left in no apparent distress in bed  [X] Call bell left within reach  [X] Nursing notified  [ ] Caregiver present  [ ] Bed alarm activated      COMMUNICATION/COLLABORATION:   The patients plan of care was discussed with: Physical Therapist, Registered Nurse and      Kuldip Faria, OT  Time Calculation: 40 mins

## 2017-04-27 LAB
25(OH)D3 SERPL-MCNC: 37.3 NG/ML (ref 30–100)
ALBUMIN SERPL BCP-MCNC: 2.5 G/DL (ref 3.5–5)
ALBUMIN/GLOB SERPL: 0.7 {RATIO} (ref 1.1–2.2)
ALP SERPL-CCNC: 34 U/L (ref 45–117)
ALT SERPL-CCNC: 20 U/L (ref 12–78)
ANION GAP BLD CALC-SCNC: 10 MMOL/L (ref 5–15)
AST SERPL W P-5'-P-CCNC: 18 U/L (ref 15–37)
BASOPHILS # BLD AUTO: 0 K/UL (ref 0–0.1)
BASOPHILS # BLD: 0 % (ref 0–1)
BILIRUB SERPL-MCNC: 0.5 MG/DL (ref 0.2–1)
BUN SERPL-MCNC: 28 MG/DL (ref 6–20)
BUN/CREAT SERPL: 21 (ref 12–20)
CALCIUM SERPL-MCNC: 8.5 MG/DL (ref 8.5–10.1)
CHLORIDE SERPL-SCNC: 108 MMOL/L (ref 97–108)
CO2 SERPL-SCNC: 24 MMOL/L (ref 21–32)
CREAT SERPL-MCNC: 1.32 MG/DL (ref 0.55–1.02)
EOSINOPHIL # BLD: 0 K/UL (ref 0–0.4)
EOSINOPHIL NFR BLD: 0 % (ref 0–7)
ERYTHROCYTE [DISTWIDTH] IN BLOOD BY AUTOMATED COUNT: 16 % (ref 11.5–14.5)
GLOBULIN SER CALC-MCNC: 3.7 G/DL (ref 2–4)
GLUCOSE SERPL-MCNC: 138 MG/DL (ref 65–100)
HCT VFR BLD AUTO: 34.7 % (ref 35–47)
HGB BLD-MCNC: 11 G/DL (ref 11.5–16)
LYMPHOCYTES # BLD AUTO: 15 % (ref 12–49)
LYMPHOCYTES # BLD: 1.6 K/UL (ref 0.8–3.5)
MAGNESIUM SERPL-MCNC: 1.8 MG/DL (ref 1.6–2.4)
MCH RBC QN AUTO: 29.7 PG (ref 26–34)
MCHC RBC AUTO-ENTMCNC: 31.7 G/DL (ref 30–36.5)
MCV RBC AUTO: 93.8 FL (ref 80–99)
MONOCYTES # BLD: 1.1 K/UL (ref 0–1)
MONOCYTES NFR BLD AUTO: 10 % (ref 5–13)
NEUTS SEG # BLD: 8 K/UL (ref 1.8–8)
NEUTS SEG NFR BLD AUTO: 75 % (ref 32–75)
PHOSPHATE SERPL-MCNC: 2.7 MG/DL (ref 2.6–4.7)
PLATELET # BLD AUTO: 348 K/UL (ref 150–400)
POTASSIUM SERPL-SCNC: 3.8 MMOL/L (ref 3.5–5.1)
PROT SERPL-MCNC: 6.2 G/DL (ref 6.4–8.2)
RBC # BLD AUTO: 3.7 M/UL (ref 3.8–5.2)
SODIUM SERPL-SCNC: 142 MMOL/L (ref 136–145)
WBC # BLD AUTO: 10.7 K/UL (ref 3.6–11)

## 2017-04-27 PROCEDURE — 80053 COMPREHEN METABOLIC PANEL: CPT | Performed by: PHYSICAL MEDICINE & REHABILITATION

## 2017-04-27 PROCEDURE — 36415 COLL VENOUS BLD VENIPUNCTURE: CPT | Performed by: PHYSICAL MEDICINE & REHABILITATION

## 2017-04-27 PROCEDURE — 85025 COMPLETE CBC W/AUTO DIFF WBC: CPT | Performed by: PHYSICAL MEDICINE & REHABILITATION

## 2017-04-27 PROCEDURE — 74011250637 HC RX REV CODE- 250/637: Performed by: PHYSICAL MEDICINE & REHABILITATION

## 2017-04-27 PROCEDURE — 83735 ASSAY OF MAGNESIUM: CPT | Performed by: PHYSICAL MEDICINE & REHABILITATION

## 2017-04-27 PROCEDURE — 74011636637 HC RX REV CODE- 636/637: Performed by: PHYSICAL MEDICINE & REHABILITATION

## 2017-04-27 PROCEDURE — 84100 ASSAY OF PHOSPHORUS: CPT | Performed by: PHYSICAL MEDICINE & REHABILITATION

## 2017-04-27 PROCEDURE — 82306 VITAMIN D 25 HYDROXY: CPT | Performed by: PHYSICAL MEDICINE & REHABILITATION

## 2017-04-27 RX ADMIN — PRAVASTATIN SODIUM 40 MG: 20 TABLET ORAL at 21:57

## 2017-04-27 RX ADMIN — FEBUXOSTAT 40 MG: 40 TABLET ORAL at 09:12

## 2017-04-27 RX ADMIN — METOPROLOL TARTRATE 25 MG: 25 TABLET ORAL at 22:03

## 2017-04-27 RX ADMIN — DOCUSATE SODIUM -SENNOSIDES 1 TABLET: 50; 8.6 TABLET, COATED ORAL at 09:12

## 2017-04-27 RX ADMIN — DOCUSATE SODIUM 100 MG: 100 CAPSULE ORAL at 21:57

## 2017-04-27 RX ADMIN — METOPROLOL TARTRATE 25 MG: 25 TABLET ORAL at 09:12

## 2017-04-27 RX ADMIN — ACETAMINOPHEN 650 MG: 325 TABLET ORAL at 21:57

## 2017-04-27 RX ADMIN — PANTOPRAZOLE SODIUM 40 MG: 40 TABLET, DELAYED RELEASE ORAL at 05:36

## 2017-04-27 RX ADMIN — POLYETHYLENE GLYCOL 3350 17 G: 17 POWDER, FOR SOLUTION ORAL at 09:12

## 2017-04-27 RX ADMIN — METRONIDAZOLE 500 MG: 250 TABLET, FILM COATED ORAL at 05:36

## 2017-04-27 RX ADMIN — ACETAMINOPHEN 650 MG: 325 TABLET ORAL at 09:19

## 2017-04-27 RX ADMIN — INSULIN LISPRO 2 UNITS: 100 INJECTION, SOLUTION INTRAVENOUS; SUBCUTANEOUS at 13:29

## 2017-04-27 RX ADMIN — FERROUS SULFATE TAB 325 MG (65 MG ELEMENTAL FE) 325 MG: 325 (65 FE) TAB at 09:12

## 2017-04-27 RX ADMIN — METRONIDAZOLE 500 MG: 250 TABLET, FILM COATED ORAL at 18:19

## 2017-04-27 RX ADMIN — DOCUSATE SODIUM 100 MG: 100 CAPSULE ORAL at 09:12

## 2017-04-27 RX ADMIN — METRONIDAZOLE 500 MG: 250 TABLET, FILM COATED ORAL at 13:28

## 2017-04-27 RX ADMIN — METRONIDAZOLE 500 MG: 250 TABLET, FILM COATED ORAL at 00:55

## 2017-04-28 LAB
BACTERIA SPEC CULT: NORMAL
CC UR VC: NORMAL
SERVICE CMNT-IMP: NORMAL

## 2017-04-28 PROCEDURE — 74011250637 HC RX REV CODE- 250/637: Performed by: PHYSICAL MEDICINE & REHABILITATION

## 2017-04-28 PROCEDURE — 74011636637 HC RX REV CODE- 636/637: Performed by: PHYSICAL MEDICINE & REHABILITATION

## 2017-04-28 RX ORDER — WARFARIN 1 MG/1
1 TABLET ORAL EVERY EVENING
Status: COMPLETED | OUTPATIENT
Start: 2017-04-28 | End: 2017-04-28

## 2017-04-28 RX ADMIN — METRONIDAZOLE 500 MG: 250 TABLET, FILM COATED ORAL at 00:30

## 2017-04-28 RX ADMIN — METRONIDAZOLE 500 MG: 250 TABLET, FILM COATED ORAL at 06:17

## 2017-04-28 RX ADMIN — METOPROLOL TARTRATE 25 MG: 25 TABLET ORAL at 09:28

## 2017-04-28 RX ADMIN — DOCUSATE SODIUM 100 MG: 100 CAPSULE ORAL at 20:32

## 2017-04-28 RX ADMIN — PRAVASTATIN SODIUM 40 MG: 20 TABLET ORAL at 20:31

## 2017-04-28 RX ADMIN — DOCUSATE SODIUM 100 MG: 100 CAPSULE ORAL at 09:28

## 2017-04-28 RX ADMIN — LEVOFLOXACIN 750 MG: 500 TABLET, FILM COATED ORAL at 17:37

## 2017-04-28 RX ADMIN — FERROUS SULFATE TAB 325 MG (65 MG ELEMENTAL FE) 325 MG: 325 (65 FE) TAB at 09:28

## 2017-04-28 RX ADMIN — DOCUSATE SODIUM -SENNOSIDES 1 TABLET: 50; 8.6 TABLET, COATED ORAL at 09:28

## 2017-04-28 RX ADMIN — WARFARIN SODIUM 1 MG: 1 TABLET ORAL at 17:37

## 2017-04-28 RX ADMIN — METRONIDAZOLE 500 MG: 250 TABLET, FILM COATED ORAL at 12:26

## 2017-04-28 RX ADMIN — METRONIDAZOLE 500 MG: 250 TABLET, FILM COATED ORAL at 17:37

## 2017-04-28 RX ADMIN — INSULIN LISPRO 2 UNITS: 100 INJECTION, SOLUTION INTRAVENOUS; SUBCUTANEOUS at 22:35

## 2017-04-28 RX ADMIN — POLYETHYLENE GLYCOL 3350 17 G: 17 POWDER, FOR SOLUTION ORAL at 09:28

## 2017-04-28 RX ADMIN — PANTOPRAZOLE SODIUM 40 MG: 40 TABLET, DELAYED RELEASE ORAL at 06:18

## 2017-04-28 RX ADMIN — FEBUXOSTAT 40 MG: 40 TABLET ORAL at 09:28

## 2017-04-28 RX ADMIN — ACETAMINOPHEN 650 MG: 325 TABLET ORAL at 10:42

## 2017-04-29 PROCEDURE — 74011250637 HC RX REV CODE- 250/637: Performed by: PHYSICAL MEDICINE & REHABILITATION

## 2017-04-29 RX ADMIN — METOPROLOL TARTRATE 25 MG: 25 TABLET ORAL at 20:27

## 2017-04-29 RX ADMIN — LEVOFLOXACIN 750 MG: 500 TABLET, FILM COATED ORAL at 16:26

## 2017-04-29 RX ADMIN — PRAVASTATIN SODIUM 40 MG: 20 TABLET ORAL at 20:27

## 2017-04-29 RX ADMIN — METRONIDAZOLE 500 MG: 250 TABLET, FILM COATED ORAL at 01:37

## 2017-04-29 RX ADMIN — FEBUXOSTAT 40 MG: 40 TABLET ORAL at 08:53

## 2017-04-29 RX ADMIN — POLYETHYLENE GLYCOL 3350 17 G: 17 POWDER, FOR SOLUTION ORAL at 08:53

## 2017-04-29 RX ADMIN — ACETAMINOPHEN 650 MG: 325 TABLET ORAL at 01:37

## 2017-04-29 RX ADMIN — METRONIDAZOLE 500 MG: 250 TABLET, FILM COATED ORAL at 05:25

## 2017-04-29 RX ADMIN — METOPROLOL TARTRATE 25 MG: 25 TABLET ORAL at 08:53

## 2017-04-29 RX ADMIN — FERROUS SULFATE TAB 325 MG (65 MG ELEMENTAL FE) 325 MG: 325 (65 FE) TAB at 08:53

## 2017-04-29 RX ADMIN — DOCUSATE SODIUM -SENNOSIDES 1 TABLET: 50; 8.6 TABLET, COATED ORAL at 08:53

## 2017-04-29 RX ADMIN — DOCUSATE SODIUM 100 MG: 100 CAPSULE ORAL at 08:53

## 2017-04-29 RX ADMIN — METRONIDAZOLE 500 MG: 250 TABLET, FILM COATED ORAL at 16:27

## 2017-04-29 RX ADMIN — METRONIDAZOLE 500 MG: 250 TABLET, FILM COATED ORAL at 12:27

## 2017-04-29 RX ADMIN — ACETAMINOPHEN 650 MG: 325 TABLET ORAL at 09:02

## 2017-04-29 RX ADMIN — DOCUSATE SODIUM 100 MG: 100 CAPSULE ORAL at 20:27

## 2017-04-29 RX ADMIN — PANTOPRAZOLE SODIUM 40 MG: 40 TABLET, DELAYED RELEASE ORAL at 05:26

## 2017-04-30 PROCEDURE — 74011250637 HC RX REV CODE- 250/637: Performed by: PHYSICAL MEDICINE & REHABILITATION

## 2017-04-30 PROCEDURE — 74011636637 HC RX REV CODE- 636/637: Performed by: PHYSICAL MEDICINE & REHABILITATION

## 2017-04-30 RX ADMIN — METRONIDAZOLE 500 MG: 250 TABLET, FILM COATED ORAL at 05:32

## 2017-04-30 RX ADMIN — DOCUSATE SODIUM 100 MG: 100 CAPSULE ORAL at 20:57

## 2017-04-30 RX ADMIN — INSULIN LISPRO 2 UNITS: 100 INJECTION, SOLUTION INTRAVENOUS; SUBCUTANEOUS at 17:15

## 2017-04-30 RX ADMIN — POLYETHYLENE GLYCOL 3350 17 G: 17 POWDER, FOR SOLUTION ORAL at 09:07

## 2017-04-30 RX ADMIN — PANTOPRAZOLE SODIUM 40 MG: 40 TABLET, DELAYED RELEASE ORAL at 05:32

## 2017-04-30 RX ADMIN — DOCUSATE SODIUM 100 MG: 100 CAPSULE ORAL at 09:07

## 2017-04-30 RX ADMIN — FEBUXOSTAT 40 MG: 40 TABLET ORAL at 09:07

## 2017-04-30 RX ADMIN — METOPROLOL TARTRATE 25 MG: 25 TABLET ORAL at 20:56

## 2017-04-30 RX ADMIN — METRONIDAZOLE 500 MG: 250 TABLET, FILM COATED ORAL at 12:31

## 2017-04-30 RX ADMIN — INSULIN LISPRO 2 UNITS: 100 INJECTION, SOLUTION INTRAVENOUS; SUBCUTANEOUS at 22:33

## 2017-04-30 RX ADMIN — ACETAMINOPHEN 650 MG: 325 TABLET ORAL at 00:30

## 2017-04-30 RX ADMIN — DOCUSATE SODIUM -SENNOSIDES 1 TABLET: 50; 8.6 TABLET, COATED ORAL at 09:07

## 2017-04-30 RX ADMIN — METOPROLOL TARTRATE 25 MG: 25 TABLET ORAL at 09:07

## 2017-04-30 RX ADMIN — PRAVASTATIN SODIUM 40 MG: 20 TABLET ORAL at 20:57

## 2017-04-30 RX ADMIN — FERROUS SULFATE TAB 325 MG (65 MG ELEMENTAL FE) 325 MG: 325 (65 FE) TAB at 09:07

## 2017-04-30 RX ADMIN — METRONIDAZOLE 500 MG: 250 TABLET, FILM COATED ORAL at 00:30

## 2017-04-30 RX ADMIN — ACETAMINOPHEN 650 MG: 325 TABLET ORAL at 20:56

## 2017-05-01 LAB
ALBUMIN SERPL BCP-MCNC: 2.6 G/DL (ref 3.5–5)
ALBUMIN/GLOB SERPL: 0.8 {RATIO} (ref 1.1–2.2)
ALP SERPL-CCNC: 41 U/L (ref 45–117)
ALT SERPL-CCNC: 18 U/L (ref 12–78)
ANION GAP BLD CALC-SCNC: 10 MMOL/L (ref 5–15)
AST SERPL W P-5'-P-CCNC: 17 U/L (ref 15–37)
BASOPHILS # BLD AUTO: 0 K/UL (ref 0–0.1)
BASOPHILS # BLD: 0 % (ref 0–1)
BILIRUB SERPL-MCNC: 0.3 MG/DL (ref 0.2–1)
BUN SERPL-MCNC: 32 MG/DL (ref 6–20)
BUN/CREAT SERPL: 22 (ref 12–20)
CALCIUM SERPL-MCNC: 8.6 MG/DL (ref 8.5–10.1)
CHLORIDE SERPL-SCNC: 108 MMOL/L (ref 97–108)
CO2 SERPL-SCNC: 24 MMOL/L (ref 21–32)
CREAT SERPL-MCNC: 1.46 MG/DL (ref 0.55–1.02)
EOSINOPHIL # BLD: 0 K/UL (ref 0–0.4)
EOSINOPHIL NFR BLD: 0 % (ref 0–7)
ERYTHROCYTE [DISTWIDTH] IN BLOOD BY AUTOMATED COUNT: 16.4 % (ref 11.5–14.5)
GLOBULIN SER CALC-MCNC: 3.4 G/DL (ref 2–4)
GLUCOSE SERPL-MCNC: 113 MG/DL (ref 65–100)
HCT VFR BLD AUTO: 33.3 % (ref 35–47)
HGB BLD-MCNC: 10.7 G/DL (ref 11.5–16)
LYMPHOCYTES # BLD AUTO: 24 % (ref 12–49)
LYMPHOCYTES # BLD: 2.1 K/UL (ref 0.8–3.5)
MCH RBC QN AUTO: 30.1 PG (ref 26–34)
MCHC RBC AUTO-ENTMCNC: 32.1 G/DL (ref 30–36.5)
MCV RBC AUTO: 93.5 FL (ref 80–99)
MONOCYTES # BLD: 0.9 K/UL (ref 0–1)
MONOCYTES NFR BLD AUTO: 10 % (ref 5–13)
NEUTS SEG # BLD: 5.8 K/UL (ref 1.8–8)
NEUTS SEG NFR BLD AUTO: 66 % (ref 32–75)
PLATELET # BLD AUTO: 323 K/UL (ref 150–400)
POTASSIUM SERPL-SCNC: 3.8 MMOL/L (ref 3.5–5.1)
PROT SERPL-MCNC: 6 G/DL (ref 6.4–8.2)
RBC # BLD AUTO: 3.56 M/UL (ref 3.8–5.2)
SODIUM SERPL-SCNC: 142 MMOL/L (ref 136–145)
WBC # BLD AUTO: 8.9 K/UL (ref 3.6–11)

## 2017-05-01 PROCEDURE — 74011250637 HC RX REV CODE- 250/637: Performed by: PHYSICAL MEDICINE & REHABILITATION

## 2017-05-01 PROCEDURE — 36415 COLL VENOUS BLD VENIPUNCTURE: CPT | Performed by: PHYSICAL MEDICINE & REHABILITATION

## 2017-05-01 PROCEDURE — 80053 COMPREHEN METABOLIC PANEL: CPT | Performed by: PHYSICAL MEDICINE & REHABILITATION

## 2017-05-01 PROCEDURE — 85025 COMPLETE CBC W/AUTO DIFF WBC: CPT | Performed by: PHYSICAL MEDICINE & REHABILITATION

## 2017-05-01 PROCEDURE — 74011636637 HC RX REV CODE- 636/637: Performed by: PHYSICAL MEDICINE & REHABILITATION

## 2017-05-01 RX ORDER — WARFARIN 2 MG/1
2 TABLET ORAL EVERY EVENING
Status: COMPLETED | OUTPATIENT
Start: 2017-05-01 | End: 2017-05-01

## 2017-05-01 RX ADMIN — ACETAMINOPHEN 650 MG: 325 TABLET ORAL at 08:41

## 2017-05-01 RX ADMIN — DOCUSATE SODIUM 100 MG: 100 CAPSULE ORAL at 08:40

## 2017-05-01 RX ADMIN — ACETAMINOPHEN 650 MG: 325 TABLET ORAL at 14:24

## 2017-05-01 RX ADMIN — METOPROLOL TARTRATE 25 MG: 25 TABLET ORAL at 08:44

## 2017-05-01 RX ADMIN — ACETAMINOPHEN 650 MG: 325 TABLET ORAL at 22:25

## 2017-05-01 RX ADMIN — FEBUXOSTAT 40 MG: 40 TABLET ORAL at 08:40

## 2017-05-01 RX ADMIN — PRAVASTATIN SODIUM 40 MG: 20 TABLET ORAL at 22:26

## 2017-05-01 RX ADMIN — INSULIN LISPRO 4 UNITS: 100 INJECTION, SOLUTION INTRAVENOUS; SUBCUTANEOUS at 12:10

## 2017-05-01 RX ADMIN — WARFARIN SODIUM 2 MG: 2 TABLET ORAL at 17:13

## 2017-05-01 RX ADMIN — DOCUSATE SODIUM -SENNOSIDES 1 TABLET: 50; 8.6 TABLET, COATED ORAL at 08:41

## 2017-05-01 RX ADMIN — METOPROLOL TARTRATE 25 MG: 25 TABLET ORAL at 22:26

## 2017-05-01 RX ADMIN — POLYETHYLENE GLYCOL 3350 17 G: 17 POWDER, FOR SOLUTION ORAL at 08:42

## 2017-05-01 RX ADMIN — PANTOPRAZOLE SODIUM 40 MG: 40 TABLET, DELAYED RELEASE ORAL at 04:55

## 2017-05-01 RX ADMIN — FERROUS SULFATE TAB 325 MG (65 MG ELEMENTAL FE) 325 MG: 325 (65 FE) TAB at 08:40

## 2017-05-01 RX ADMIN — DOCUSATE SODIUM 100 MG: 100 CAPSULE ORAL at 22:26

## 2017-05-02 PROCEDURE — 74011250637 HC RX REV CODE- 250/637: Performed by: PHYSICAL MEDICINE & REHABILITATION

## 2017-05-02 PROCEDURE — 74011636637 HC RX REV CODE- 636/637: Performed by: PHYSICAL MEDICINE & REHABILITATION

## 2017-05-02 RX ADMIN — DOCUSATE SODIUM 100 MG: 100 CAPSULE ORAL at 09:14

## 2017-05-02 RX ADMIN — DOCUSATE SODIUM -SENNOSIDES 1 TABLET: 50; 8.6 TABLET, COATED ORAL at 09:14

## 2017-05-02 RX ADMIN — METOPROLOL TARTRATE 25 MG: 25 TABLET ORAL at 09:14

## 2017-05-02 RX ADMIN — WARFARIN SODIUM 3 MG: 2 TABLET ORAL at 17:14

## 2017-05-02 RX ADMIN — ACETAMINOPHEN 650 MG: 325 TABLET ORAL at 10:14

## 2017-05-02 RX ADMIN — METOPROLOL TARTRATE 25 MG: 25 TABLET ORAL at 21:06

## 2017-05-02 RX ADMIN — ACETAMINOPHEN 650 MG: 325 TABLET ORAL at 21:09

## 2017-05-02 RX ADMIN — PANTOPRAZOLE SODIUM 40 MG: 40 TABLET, DELAYED RELEASE ORAL at 06:18

## 2017-05-02 RX ADMIN — DOCUSATE SODIUM 100 MG: 100 CAPSULE ORAL at 21:06

## 2017-05-02 RX ADMIN — INSULIN LISPRO 2 UNITS: 100 INJECTION, SOLUTION INTRAVENOUS; SUBCUTANEOUS at 12:19

## 2017-05-02 RX ADMIN — FEBUXOSTAT 40 MG: 40 TABLET ORAL at 09:14

## 2017-05-02 RX ADMIN — POLYETHYLENE GLYCOL 3350 17 G: 17 POWDER, FOR SOLUTION ORAL at 09:14

## 2017-05-02 RX ADMIN — PRAVASTATIN SODIUM 40 MG: 20 TABLET ORAL at 21:06

## 2017-05-02 RX ADMIN — INSULIN LISPRO 2 UNITS: 100 INJECTION, SOLUTION INTRAVENOUS; SUBCUTANEOUS at 21:06

## 2017-05-02 RX ADMIN — FERROUS SULFATE TAB 325 MG (65 MG ELEMENTAL FE) 325 MG: 325 (65 FE) TAB at 09:14

## 2017-05-03 LAB
APPEARANCE UR: ABNORMAL
BACTERIA URNS QL MICRO: ABNORMAL /HPF
BILIRUB UR QL: NEGATIVE
COLOR UR: ABNORMAL
EPITH CASTS URNS QL MICRO: ABNORMAL /LPF
GLUCOSE UR STRIP.AUTO-MCNC: NEGATIVE MG/DL
HGB UR QL STRIP: ABNORMAL
HYALINE CASTS URNS QL MICRO: ABNORMAL /LPF (ref 0–5)
KETONES UR QL STRIP.AUTO: NEGATIVE MG/DL
LEUKOCYTE ESTERASE UR QL STRIP.AUTO: ABNORMAL
MUCOUS THREADS URNS QL MICRO: ABNORMAL /LPF
NITRITE UR QL STRIP.AUTO: POSITIVE
PH UR STRIP: 5 [PH] (ref 5–8)
PROT UR STRIP-MCNC: NEGATIVE MG/DL
RBC #/AREA URNS HPF: ABNORMAL /HPF (ref 0–5)
SP GR UR REFRACTOMETRY: 1.02 (ref 1–1.03)
UA: UC IF INDICATED,UAUC: ABNORMAL
UROBILINOGEN UR QL STRIP.AUTO: 0.2 EU/DL (ref 0.2–1)
WBC URNS QL MICRO: ABNORMAL /HPF (ref 0–4)

## 2017-05-03 PROCEDURE — 74011250637 HC RX REV CODE- 250/637: Performed by: PHYSICAL MEDICINE & REHABILITATION

## 2017-05-03 PROCEDURE — 87086 URINE CULTURE/COLONY COUNT: CPT | Performed by: PHYSICAL MEDICINE & REHABILITATION

## 2017-05-03 PROCEDURE — 87077 CULTURE AEROBIC IDENTIFY: CPT | Performed by: PHYSICAL MEDICINE & REHABILITATION

## 2017-05-03 PROCEDURE — 81001 URINALYSIS AUTO W/SCOPE: CPT | Performed by: PHYSICAL MEDICINE & REHABILITATION

## 2017-05-03 PROCEDURE — 74011636637 HC RX REV CODE- 636/637: Performed by: PHYSICAL MEDICINE & REHABILITATION

## 2017-05-03 PROCEDURE — 87186 SC STD MICRODIL/AGAR DIL: CPT | Performed by: PHYSICAL MEDICINE & REHABILITATION

## 2017-05-03 RX ORDER — CEFDINIR 300 MG/1
300 CAPSULE ORAL 2 TIMES DAILY
Status: DISCONTINUED | OUTPATIENT
Start: 2017-05-03 | End: 2017-05-05

## 2017-05-03 RX ORDER — WARFARIN 2.5 MG/1
2.5 TABLET ORAL EVERY EVENING
Status: COMPLETED | OUTPATIENT
Start: 2017-05-03 | End: 2017-05-03

## 2017-05-03 RX ORDER — TRAMADOL HYDROCHLORIDE 50 MG/1
50 TABLET ORAL
Status: DISCONTINUED | OUTPATIENT
Start: 2017-05-03 | End: 2017-05-03

## 2017-05-03 RX ORDER — TRAMADOL HYDROCHLORIDE 50 MG/1
25 TABLET ORAL
Status: DISCONTINUED | OUTPATIENT
Start: 2017-05-03 | End: 2017-05-16 | Stop reason: HOSPADM

## 2017-05-03 RX ADMIN — PANTOPRAZOLE SODIUM 40 MG: 40 TABLET, DELAYED RELEASE ORAL at 06:19

## 2017-05-03 RX ADMIN — ACETAMINOPHEN 650 MG: 325 TABLET ORAL at 01:46

## 2017-05-03 RX ADMIN — FEBUXOSTAT 40 MG: 40 TABLET ORAL at 09:18

## 2017-05-03 RX ADMIN — DOCUSATE SODIUM 100 MG: 100 CAPSULE ORAL at 20:54

## 2017-05-03 RX ADMIN — PRAVASTATIN SODIUM 40 MG: 20 TABLET ORAL at 20:55

## 2017-05-03 RX ADMIN — FERROUS SULFATE TAB 325 MG (65 MG ELEMENTAL FE) 325 MG: 325 (65 FE) TAB at 09:18

## 2017-05-03 RX ADMIN — ACETAMINOPHEN 650 MG: 325 TABLET ORAL at 09:20

## 2017-05-03 RX ADMIN — CEFDINIR 300 MG: 300 CAPSULE ORAL at 20:54

## 2017-05-03 RX ADMIN — ACETAMINOPHEN 650 MG: 325 TABLET ORAL at 06:20

## 2017-05-03 RX ADMIN — INSULIN LISPRO 6 UNITS: 100 INJECTION, SOLUTION INTRAVENOUS; SUBCUTANEOUS at 12:30

## 2017-05-03 RX ADMIN — TRAMADOL HYDROCHLORIDE 50 MG: 50 TABLET, FILM COATED ORAL at 12:30

## 2017-05-03 RX ADMIN — TRAMADOL HYDROCHLORIDE 25 MG: 50 TABLET, FILM COATED ORAL at 20:54

## 2017-05-03 RX ADMIN — WARFARIN SODIUM 2.5 MG: 2.5 TABLET ORAL at 16:58

## 2017-05-03 RX ADMIN — METOPROLOL TARTRATE 25 MG: 25 TABLET ORAL at 20:54

## 2017-05-04 LAB
ANION GAP BLD CALC-SCNC: 9 MMOL/L (ref 5–15)
BASOPHILS # BLD AUTO: 0 K/UL (ref 0–0.1)
BASOPHILS # BLD: 1 % (ref 0–1)
BUN SERPL-MCNC: 28 MG/DL (ref 6–20)
BUN/CREAT SERPL: 23 (ref 12–20)
CALCIUM SERPL-MCNC: 8.3 MG/DL (ref 8.5–10.1)
CHLORIDE SERPL-SCNC: 108 MMOL/L (ref 97–108)
CO2 SERPL-SCNC: 24 MMOL/L (ref 21–32)
CREAT SERPL-MCNC: 1.24 MG/DL (ref 0.55–1.02)
EOSINOPHIL # BLD: 0 K/UL (ref 0–0.4)
EOSINOPHIL NFR BLD: 0 % (ref 0–7)
ERYTHROCYTE [DISTWIDTH] IN BLOOD BY AUTOMATED COUNT: 16.5 % (ref 11.5–14.5)
GLUCOSE SERPL-MCNC: 117 MG/DL (ref 65–100)
HCT VFR BLD AUTO: 35.2 % (ref 35–47)
HGB BLD-MCNC: 11.1 G/DL (ref 11.5–16)
LYMPHOCYTES # BLD AUTO: 21 % (ref 12–49)
LYMPHOCYTES # BLD: 1.7 K/UL (ref 0.8–3.5)
MCH RBC QN AUTO: 29.9 PG (ref 26–34)
MCHC RBC AUTO-ENTMCNC: 31.5 G/DL (ref 30–36.5)
MCV RBC AUTO: 94.9 FL (ref 80–99)
MONOCYTES # BLD: 1 K/UL (ref 0–1)
MONOCYTES NFR BLD AUTO: 12 % (ref 5–13)
NEUTS SEG # BLD: 5.6 K/UL (ref 1.8–8)
NEUTS SEG NFR BLD AUTO: 66 % (ref 32–75)
PLATELET # BLD AUTO: 291 K/UL (ref 150–400)
POTASSIUM SERPL-SCNC: 3.8 MMOL/L (ref 3.5–5.1)
RBC # BLD AUTO: 3.71 M/UL (ref 3.8–5.2)
SODIUM SERPL-SCNC: 141 MMOL/L (ref 136–145)
WBC # BLD AUTO: 8.3 K/UL (ref 3.6–11)

## 2017-05-04 PROCEDURE — 74011636637 HC RX REV CODE- 636/637: Performed by: PHYSICAL MEDICINE & REHABILITATION

## 2017-05-04 PROCEDURE — 36415 COLL VENOUS BLD VENIPUNCTURE: CPT | Performed by: PHYSICAL MEDICINE & REHABILITATION

## 2017-05-04 PROCEDURE — 85027 COMPLETE CBC AUTOMATED: CPT | Performed by: PHYSICAL MEDICINE & REHABILITATION

## 2017-05-04 PROCEDURE — 74011250637 HC RX REV CODE- 250/637: Performed by: PHYSICAL MEDICINE & REHABILITATION

## 2017-05-04 PROCEDURE — 80048 BASIC METABOLIC PNL TOTAL CA: CPT | Performed by: PHYSICAL MEDICINE & REHABILITATION

## 2017-05-04 RX ORDER — WARFARIN 1 MG/1
1 TABLET ORAL EVERY EVENING
Status: COMPLETED | OUTPATIENT
Start: 2017-05-04 | End: 2017-05-04

## 2017-05-04 RX ADMIN — PANTOPRAZOLE SODIUM 40 MG: 40 TABLET, DELAYED RELEASE ORAL at 05:32

## 2017-05-04 RX ADMIN — DOCUSATE SODIUM 100 MG: 100 CAPSULE ORAL at 08:10

## 2017-05-04 RX ADMIN — FEBUXOSTAT 40 MG: 40 TABLET ORAL at 08:10

## 2017-05-04 RX ADMIN — ACETAMINOPHEN 650 MG: 325 TABLET ORAL at 08:11

## 2017-05-04 RX ADMIN — INSULIN LISPRO 2 UNITS: 100 INJECTION, SOLUTION INTRAVENOUS; SUBCUTANEOUS at 22:34

## 2017-05-04 RX ADMIN — CEFDINIR 300 MG: 300 CAPSULE ORAL at 08:10

## 2017-05-04 RX ADMIN — DOCUSATE SODIUM -SENNOSIDES 1 TABLET: 50; 8.6 TABLET, COATED ORAL at 08:11

## 2017-05-04 RX ADMIN — METOPROLOL TARTRATE 25 MG: 25 TABLET ORAL at 20:42

## 2017-05-04 RX ADMIN — WARFARIN SODIUM 1 MG: 1 TABLET ORAL at 17:01

## 2017-05-04 RX ADMIN — ACETAMINOPHEN 650 MG: 325 TABLET ORAL at 01:28

## 2017-05-04 RX ADMIN — POLYETHYLENE GLYCOL 3350 17 G: 17 POWDER, FOR SOLUTION ORAL at 08:10

## 2017-05-04 RX ADMIN — ACETAMINOPHEN 650 MG: 325 TABLET ORAL at 17:01

## 2017-05-04 RX ADMIN — FERROUS SULFATE TAB 325 MG (65 MG ELEMENTAL FE) 325 MG: 325 (65 FE) TAB at 08:11

## 2017-05-04 RX ADMIN — DOCUSATE SODIUM 100 MG: 100 CAPSULE ORAL at 20:42

## 2017-05-04 RX ADMIN — ACETAMINOPHEN 650 MG: 325 TABLET ORAL at 05:32

## 2017-05-04 RX ADMIN — TRAMADOL HYDROCHLORIDE 25 MG: 50 TABLET, FILM COATED ORAL at 20:40

## 2017-05-04 RX ADMIN — INSULIN LISPRO 2 UNITS: 100 INJECTION, SOLUTION INTRAVENOUS; SUBCUTANEOUS at 12:10

## 2017-05-04 RX ADMIN — PRAVASTATIN SODIUM 40 MG: 20 TABLET ORAL at 20:41

## 2017-05-04 RX ADMIN — TRAMADOL HYDROCHLORIDE 25 MG: 50 TABLET, FILM COATED ORAL at 01:28

## 2017-05-04 RX ADMIN — CEFDINIR 300 MG: 300 CAPSULE ORAL at 20:40

## 2017-05-04 RX ADMIN — TRAMADOL HYDROCHLORIDE 25 MG: 50 TABLET, FILM COATED ORAL at 12:10

## 2017-05-05 ENCOUNTER — APPOINTMENT (OUTPATIENT)
Dept: GENERAL RADIOLOGY | Age: 82
End: 2017-05-05
Attending: PHYSICAL MEDICINE & REHABILITATION

## 2017-05-05 PROCEDURE — 74011250637 HC RX REV CODE- 250/637: Performed by: PHYSICAL MEDICINE & REHABILITATION

## 2017-05-05 PROCEDURE — 74000 XR ABD (KUB): CPT

## 2017-05-05 RX ORDER — WARFARIN 1 MG/1
1 TABLET ORAL EVERY EVENING
Status: COMPLETED | OUTPATIENT
Start: 2017-05-05 | End: 2017-05-05

## 2017-05-05 RX ADMIN — ACETAMINOPHEN 650 MG: 325 TABLET ORAL at 11:03

## 2017-05-05 RX ADMIN — LEVOFLOXACIN 750 MG: 500 TABLET, FILM COATED ORAL at 17:41

## 2017-05-05 RX ADMIN — FERROUS SULFATE TAB 325 MG (65 MG ELEMENTAL FE) 325 MG: 325 (65 FE) TAB at 09:09

## 2017-05-05 RX ADMIN — METOPROLOL TARTRATE 25 MG: 25 TABLET ORAL at 09:09

## 2017-05-05 RX ADMIN — TRAMADOL HYDROCHLORIDE 25 MG: 50 TABLET, FILM COATED ORAL at 17:43

## 2017-05-05 RX ADMIN — DOCUSATE SODIUM 100 MG: 100 CAPSULE ORAL at 22:10

## 2017-05-05 RX ADMIN — WARFARIN SODIUM 1 MG: 1 TABLET ORAL at 17:41

## 2017-05-05 RX ADMIN — DOCUSATE SODIUM -SENNOSIDES 1 TABLET: 50; 8.6 TABLET, COATED ORAL at 09:09

## 2017-05-05 RX ADMIN — TRAMADOL HYDROCHLORIDE 25 MG: 50 TABLET, FILM COATED ORAL at 09:13

## 2017-05-05 RX ADMIN — PANTOPRAZOLE SODIUM 40 MG: 40 TABLET, DELAYED RELEASE ORAL at 05:08

## 2017-05-05 RX ADMIN — DOCUSATE SODIUM 100 MG: 100 CAPSULE ORAL at 09:09

## 2017-05-05 RX ADMIN — METOPROLOL TARTRATE 25 MG: 25 TABLET ORAL at 22:11

## 2017-05-05 RX ADMIN — FEBUXOSTAT 40 MG: 40 TABLET ORAL at 09:09

## 2017-05-05 RX ADMIN — CEFDINIR 300 MG: 300 CAPSULE ORAL at 09:09

## 2017-05-05 RX ADMIN — POLYETHYLENE GLYCOL 3350 17 G: 17 POWDER, FOR SOLUTION ORAL at 09:09

## 2017-05-05 RX ADMIN — PRAVASTATIN SODIUM 40 MG: 20 TABLET ORAL at 22:10

## 2017-05-06 LAB
ANION GAP BLD CALC-SCNC: 11 MMOL/L (ref 5–15)
BACTERIA SPEC CULT: ABNORMAL
BASOPHILS # BLD AUTO: 0 K/UL (ref 0–0.1)
BASOPHILS # BLD: 1 % (ref 0–1)
BUN SERPL-MCNC: 22 MG/DL (ref 6–20)
BUN/CREAT SERPL: 20 (ref 12–20)
CALCIUM SERPL-MCNC: 8.2 MG/DL (ref 8.5–10.1)
CC UR VC: ABNORMAL
CHLORIDE SERPL-SCNC: 108 MMOL/L (ref 97–108)
CO2 SERPL-SCNC: 23 MMOL/L (ref 21–32)
CREAT SERPL-MCNC: 1.1 MG/DL (ref 0.55–1.02)
EOSINOPHIL # BLD: 0 K/UL (ref 0–0.4)
EOSINOPHIL NFR BLD: 0 % (ref 0–7)
ERYTHROCYTE [DISTWIDTH] IN BLOOD BY AUTOMATED COUNT: 16.6 % (ref 11.5–14.5)
GLUCOSE SERPL-MCNC: 99 MG/DL (ref 65–100)
HCT VFR BLD AUTO: 32.3 % (ref 35–47)
HGB BLD-MCNC: 10 G/DL (ref 11.5–16)
LYMPHOCYTES # BLD AUTO: 21 % (ref 12–49)
LYMPHOCYTES # BLD: 1.6 K/UL (ref 0.8–3.5)
MCH RBC QN AUTO: 29.5 PG (ref 26–34)
MCHC RBC AUTO-ENTMCNC: 31 G/DL (ref 30–36.5)
MCV RBC AUTO: 95.3 FL (ref 80–99)
MONOCYTES # BLD: 1 K/UL (ref 0–1)
MONOCYTES NFR BLD AUTO: 14 % (ref 5–13)
NEUTS SEG # BLD: 4.8 K/UL (ref 1.8–8)
NEUTS SEG NFR BLD AUTO: 64 % (ref 32–75)
PLATELET # BLD AUTO: 274 K/UL (ref 150–400)
POTASSIUM SERPL-SCNC: 4.6 MMOL/L (ref 3.5–5.1)
RBC # BLD AUTO: 3.39 M/UL (ref 3.8–5.2)
SERVICE CMNT-IMP: ABNORMAL
SODIUM SERPL-SCNC: 142 MMOL/L (ref 136–145)
WBC # BLD AUTO: 7.4 K/UL (ref 3.6–11)

## 2017-05-06 PROCEDURE — 74011250637 HC RX REV CODE- 250/637: Performed by: PHYSICAL MEDICINE & REHABILITATION

## 2017-05-06 PROCEDURE — 36415 COLL VENOUS BLD VENIPUNCTURE: CPT | Performed by: PHYSICAL MEDICINE & REHABILITATION

## 2017-05-06 PROCEDURE — 74011250636 HC RX REV CODE- 250/636: Performed by: PHYSICAL MEDICINE & REHABILITATION

## 2017-05-06 PROCEDURE — 85025 COMPLETE CBC W/AUTO DIFF WBC: CPT | Performed by: PHYSICAL MEDICINE & REHABILITATION

## 2017-05-06 PROCEDURE — 74011000258 HC RX REV CODE- 258: Performed by: PHYSICAL MEDICINE & REHABILITATION

## 2017-05-06 PROCEDURE — 74011636637 HC RX REV CODE- 636/637: Performed by: PHYSICAL MEDICINE & REHABILITATION

## 2017-05-06 PROCEDURE — 80048 BASIC METABOLIC PNL TOTAL CA: CPT | Performed by: PHYSICAL MEDICINE & REHABILITATION

## 2017-05-06 RX ORDER — WARFARIN 1 MG/1
1.5 TABLET ORAL EVERY EVENING
Status: COMPLETED | OUTPATIENT
Start: 2017-05-06 | End: 2017-05-06

## 2017-05-06 RX ADMIN — METOPROLOL TARTRATE 25 MG: 25 TABLET ORAL at 08:43

## 2017-05-06 RX ADMIN — FERROUS SULFATE TAB 325 MG (65 MG ELEMENTAL FE) 325 MG: 325 (65 FE) TAB at 08:43

## 2017-05-06 RX ADMIN — DOCUSATE SODIUM 100 MG: 100 CAPSULE ORAL at 21:26

## 2017-05-06 RX ADMIN — Medication 1 CAPSULE: at 21:26

## 2017-05-06 RX ADMIN — PRAVASTATIN SODIUM 40 MG: 20 TABLET ORAL at 21:26

## 2017-05-06 RX ADMIN — WARFARIN SODIUM 1.5 MG: 1 TABLET ORAL at 16:57

## 2017-05-06 RX ADMIN — TRAMADOL HYDROCHLORIDE 25 MG: 50 TABLET, FILM COATED ORAL at 08:43

## 2017-05-06 RX ADMIN — FEBUXOSTAT 40 MG: 40 TABLET ORAL at 08:44

## 2017-05-06 RX ADMIN — DOCUSATE SODIUM -SENNOSIDES 1 TABLET: 50; 8.6 TABLET, COATED ORAL at 08:43

## 2017-05-06 RX ADMIN — CEFEPIME HYDROCHLORIDE 1 G: 1 INJECTION, POWDER, FOR SOLUTION INTRAMUSCULAR; INTRAVENOUS at 15:19

## 2017-05-06 RX ADMIN — METOPROLOL TARTRATE 25 MG: 25 TABLET ORAL at 21:26

## 2017-05-06 RX ADMIN — POLYETHYLENE GLYCOL 3350 17 G: 17 POWDER, FOR SOLUTION ORAL at 08:44

## 2017-05-06 RX ADMIN — INSULIN LISPRO 2 UNITS: 100 INJECTION, SOLUTION INTRAVENOUS; SUBCUTANEOUS at 12:00

## 2017-05-06 RX ADMIN — PANTOPRAZOLE SODIUM 40 MG: 40 TABLET, DELAYED RELEASE ORAL at 05:56

## 2017-05-06 RX ADMIN — DOCUSATE SODIUM 100 MG: 100 CAPSULE ORAL at 08:43

## 2017-05-07 PROCEDURE — 74011250636 HC RX REV CODE- 250/636: Performed by: PHYSICAL MEDICINE & REHABILITATION

## 2017-05-07 PROCEDURE — 74011250637 HC RX REV CODE- 250/637: Performed by: PHYSICAL MEDICINE & REHABILITATION

## 2017-05-07 PROCEDURE — 74011000258 HC RX REV CODE- 258: Performed by: PHYSICAL MEDICINE & REHABILITATION

## 2017-05-07 RX ORDER — SODIUM CHLORIDE 0.9 % (FLUSH) 0.9 %
10 SYRINGE (ML) INJECTION AS NEEDED
Status: DISCONTINUED | OUTPATIENT
Start: 2017-05-07 | End: 2017-05-13

## 2017-05-07 RX ORDER — SODIUM CHLORIDE 0.9 % (FLUSH) 0.9 %
5 SYRINGE (ML) INJECTION EVERY 8 HOURS
Status: DISCONTINUED | OUTPATIENT
Start: 2017-05-07 | End: 2017-05-13

## 2017-05-07 RX ORDER — WARFARIN 2 MG/1
2 TABLET ORAL EVERY EVENING
Status: COMPLETED | OUTPATIENT
Start: 2017-05-07 | End: 2017-05-07

## 2017-05-07 RX ADMIN — Medication 5 ML: at 14:56

## 2017-05-07 RX ADMIN — DOCUSATE SODIUM 100 MG: 100 CAPSULE ORAL at 20:35

## 2017-05-07 RX ADMIN — WARFARIN SODIUM 2 MG: 2 TABLET ORAL at 16:59

## 2017-05-07 RX ADMIN — FEBUXOSTAT 40 MG: 40 TABLET ORAL at 09:06

## 2017-05-07 RX ADMIN — Medication 1 CAPSULE: at 20:36

## 2017-05-07 RX ADMIN — PRAVASTATIN SODIUM 40 MG: 20 TABLET ORAL at 20:35

## 2017-05-07 RX ADMIN — METOPROLOL TARTRATE 25 MG: 25 TABLET ORAL at 09:05

## 2017-05-07 RX ADMIN — FERROUS SULFATE TAB 325 MG (65 MG ELEMENTAL FE) 325 MG: 325 (65 FE) TAB at 09:05

## 2017-05-07 RX ADMIN — ACETAMINOPHEN 650 MG: 325 TABLET ORAL at 03:19

## 2017-05-07 RX ADMIN — Medication 5 ML: at 20:37

## 2017-05-07 RX ADMIN — PANTOPRAZOLE SODIUM 40 MG: 40 TABLET, DELAYED RELEASE ORAL at 05:14

## 2017-05-07 RX ADMIN — METOPROLOL TARTRATE 25 MG: 25 TABLET ORAL at 20:36

## 2017-05-07 RX ADMIN — DOCUSATE SODIUM -SENNOSIDES 1 TABLET: 50; 8.6 TABLET, COATED ORAL at 09:06

## 2017-05-07 RX ADMIN — CEFEPIME HYDROCHLORIDE 1 G: 1 INJECTION, POWDER, FOR SOLUTION INTRAMUSCULAR; INTRAVENOUS at 14:57

## 2017-05-08 LAB
ERYTHROCYTE [DISTWIDTH] IN BLOOD BY AUTOMATED COUNT: 16.6 % (ref 11.5–14.5)
HCT VFR BLD AUTO: 32.4 % (ref 35–47)
HGB BLD-MCNC: 9.8 G/DL (ref 11.5–16)
MCH RBC QN AUTO: 29.3 PG (ref 26–34)
MCHC RBC AUTO-ENTMCNC: 30.2 G/DL (ref 30–36.5)
MCV RBC AUTO: 97 FL (ref 80–99)
PLATELET # BLD AUTO: 265 K/UL (ref 150–400)
RBC # BLD AUTO: 3.34 M/UL (ref 3.8–5.2)
WBC # BLD AUTO: 7.2 K/UL (ref 3.6–11)

## 2017-05-08 PROCEDURE — 36415 COLL VENOUS BLD VENIPUNCTURE: CPT | Performed by: PHYSICAL MEDICINE & REHABILITATION

## 2017-05-08 PROCEDURE — 74011250636 HC RX REV CODE- 250/636: Performed by: PHYSICAL MEDICINE & REHABILITATION

## 2017-05-08 PROCEDURE — 74011636637 HC RX REV CODE- 636/637: Performed by: PHYSICAL MEDICINE & REHABILITATION

## 2017-05-08 PROCEDURE — 74011250637 HC RX REV CODE- 250/637: Performed by: PHYSICAL MEDICINE & REHABILITATION

## 2017-05-08 PROCEDURE — 74011000258 HC RX REV CODE- 258: Performed by: PHYSICAL MEDICINE & REHABILITATION

## 2017-05-08 PROCEDURE — 85027 COMPLETE CBC AUTOMATED: CPT | Performed by: PHYSICAL MEDICINE & REHABILITATION

## 2017-05-08 RX ORDER — WARFARIN 2 MG/1
2 TABLET ORAL ONCE
Status: COMPLETED | OUTPATIENT
Start: 2017-05-08 | End: 2017-05-08

## 2017-05-08 RX ADMIN — FERROUS SULFATE TAB 325 MG (65 MG ELEMENTAL FE) 325 MG: 325 (65 FE) TAB at 09:17

## 2017-05-08 RX ADMIN — Medication 5 ML: at 05:25

## 2017-05-08 RX ADMIN — CEFEPIME HYDROCHLORIDE 1 G: 1 INJECTION, POWDER, FOR SOLUTION INTRAMUSCULAR; INTRAVENOUS at 16:20

## 2017-05-08 RX ADMIN — PANTOPRAZOLE SODIUM 40 MG: 40 TABLET, DELAYED RELEASE ORAL at 05:27

## 2017-05-08 RX ADMIN — ACETAMINOPHEN 650 MG: 325 TABLET ORAL at 09:17

## 2017-05-08 RX ADMIN — Medication 5 ML: at 12:53

## 2017-05-08 RX ADMIN — DOCUSATE SODIUM 100 MG: 100 CAPSULE ORAL at 20:00

## 2017-05-08 RX ADMIN — INSULIN LISPRO 2 UNITS: 100 INJECTION, SOLUTION INTRAVENOUS; SUBCUTANEOUS at 12:52

## 2017-05-08 RX ADMIN — DOCUSATE SODIUM 100 MG: 100 CAPSULE ORAL at 09:16

## 2017-05-08 RX ADMIN — WARFARIN SODIUM 2 MG: 2 TABLET ORAL at 18:11

## 2017-05-08 RX ADMIN — METOPROLOL TARTRATE 25 MG: 25 TABLET ORAL at 09:16

## 2017-05-08 RX ADMIN — PRAVASTATIN SODIUM 40 MG: 20 TABLET ORAL at 20:00

## 2017-05-08 RX ADMIN — TRAMADOL HYDROCHLORIDE 25 MG: 50 TABLET, FILM COATED ORAL at 19:56

## 2017-05-08 RX ADMIN — METOPROLOL TARTRATE 25 MG: 25 TABLET ORAL at 20:00

## 2017-05-08 RX ADMIN — DOCUSATE SODIUM -SENNOSIDES 1 TABLET: 50; 8.6 TABLET, COATED ORAL at 09:16

## 2017-05-08 RX ADMIN — Medication 1 CAPSULE: at 20:00

## 2017-05-08 RX ADMIN — FEBUXOSTAT 40 MG: 40 TABLET ORAL at 09:17

## 2017-05-09 PROCEDURE — 74011250637 HC RX REV CODE- 250/637: Performed by: PHYSICAL MEDICINE & REHABILITATION

## 2017-05-09 PROCEDURE — 74011636637 HC RX REV CODE- 636/637: Performed by: PHYSICAL MEDICINE & REHABILITATION

## 2017-05-09 PROCEDURE — 74011000258 HC RX REV CODE- 258: Performed by: PHYSICAL MEDICINE & REHABILITATION

## 2017-05-09 PROCEDURE — 74011250636 HC RX REV CODE- 250/636: Performed by: PHYSICAL MEDICINE & REHABILITATION

## 2017-05-09 RX ORDER — WARFARIN 2 MG/1
2 TABLET ORAL EVERY EVENING
Status: COMPLETED | OUTPATIENT
Start: 2017-05-09 | End: 2017-05-09

## 2017-05-09 RX ADMIN — METOPROLOL TARTRATE 25 MG: 25 TABLET ORAL at 09:00

## 2017-05-09 RX ADMIN — DOCUSATE SODIUM -SENNOSIDES 1 TABLET: 50; 8.6 TABLET, COATED ORAL at 09:00

## 2017-05-09 RX ADMIN — TRAMADOL HYDROCHLORIDE 25 MG: 50 TABLET, FILM COATED ORAL at 01:26

## 2017-05-09 RX ADMIN — WARFARIN SODIUM 2 MG: 2 TABLET ORAL at 17:14

## 2017-05-09 RX ADMIN — ACETAMINOPHEN 650 MG: 325 TABLET ORAL at 09:06

## 2017-05-09 RX ADMIN — Medication 1 CAPSULE: at 21:31

## 2017-05-09 RX ADMIN — Medication 5 ML: at 01:28

## 2017-05-09 RX ADMIN — INSULIN LISPRO 2 UNITS: 100 INJECTION, SOLUTION INTRAVENOUS; SUBCUTANEOUS at 11:45

## 2017-05-09 RX ADMIN — TRAMADOL HYDROCHLORIDE 25 MG: 50 TABLET, FILM COATED ORAL at 21:30

## 2017-05-09 RX ADMIN — FERROUS SULFATE TAB 325 MG (65 MG ELEMENTAL FE) 325 MG: 325 (65 FE) TAB at 09:00

## 2017-05-09 RX ADMIN — Medication 5 ML: at 11:46

## 2017-05-09 RX ADMIN — FEBUXOSTAT 40 MG: 40 TABLET ORAL at 09:00

## 2017-05-09 RX ADMIN — METOPROLOL TARTRATE 25 MG: 25 TABLET ORAL at 21:29

## 2017-05-09 RX ADMIN — DOCUSATE SODIUM 100 MG: 100 CAPSULE ORAL at 21:29

## 2017-05-09 RX ADMIN — TRAMADOL HYDROCHLORIDE 25 MG: 50 TABLET, FILM COATED ORAL at 11:46

## 2017-05-09 RX ADMIN — PANTOPRAZOLE SODIUM 40 MG: 40 TABLET, DELAYED RELEASE ORAL at 06:03

## 2017-05-09 RX ADMIN — DOCUSATE SODIUM 100 MG: 100 CAPSULE ORAL at 09:00

## 2017-05-09 RX ADMIN — Medication: at 21:35

## 2017-05-09 RX ADMIN — POLYETHYLENE GLYCOL 3350 17 G: 17 POWDER, FOR SOLUTION ORAL at 09:00

## 2017-05-09 RX ADMIN — PRAVASTATIN SODIUM 40 MG: 20 TABLET ORAL at 21:32

## 2017-05-09 RX ADMIN — CEFEPIME HYDROCHLORIDE 1 G: 1 INJECTION, POWDER, FOR SOLUTION INTRAMUSCULAR; INTRAVENOUS at 17:14

## 2017-05-09 RX ADMIN — Medication 5 ML: at 06:03

## 2017-05-10 PROCEDURE — 74011000258 HC RX REV CODE- 258: Performed by: PHYSICAL MEDICINE & REHABILITATION

## 2017-05-10 PROCEDURE — 74011250637 HC RX REV CODE- 250/637: Performed by: PHYSICAL MEDICINE & REHABILITATION

## 2017-05-10 PROCEDURE — 74011250636 HC RX REV CODE- 250/636: Performed by: PHYSICAL MEDICINE & REHABILITATION

## 2017-05-10 PROCEDURE — 74011636637 HC RX REV CODE- 636/637: Performed by: PHYSICAL MEDICINE & REHABILITATION

## 2017-05-10 RX ORDER — WARFARIN 2 MG/1
2 TABLET ORAL EVERY EVENING
Status: COMPLETED | OUTPATIENT
Start: 2017-05-10 | End: 2017-05-10

## 2017-05-10 RX ADMIN — METOPROLOL TARTRATE 25 MG: 25 TABLET ORAL at 08:20

## 2017-05-10 RX ADMIN — POLYETHYLENE GLYCOL 3350 17 G: 17 POWDER, FOR SOLUTION ORAL at 08:20

## 2017-05-10 RX ADMIN — CEFEPIME HYDROCHLORIDE 1 G: 1 INJECTION, POWDER, FOR SOLUTION INTRAMUSCULAR; INTRAVENOUS at 16:20

## 2017-05-10 RX ADMIN — WARFARIN SODIUM 2 MG: 2 TABLET ORAL at 17:35

## 2017-05-10 RX ADMIN — METOPROLOL TARTRATE 25 MG: 25 TABLET ORAL at 23:09

## 2017-05-10 RX ADMIN — FEBUXOSTAT 40 MG: 40 TABLET ORAL at 08:20

## 2017-05-10 RX ADMIN — PANTOPRAZOLE SODIUM 40 MG: 40 TABLET, DELAYED RELEASE ORAL at 06:15

## 2017-05-10 RX ADMIN — DOCUSATE SODIUM 100 MG: 100 CAPSULE ORAL at 23:09

## 2017-05-10 RX ADMIN — PRAVASTATIN SODIUM 40 MG: 20 TABLET ORAL at 23:09

## 2017-05-10 RX ADMIN — Medication 5 ML: at 23:11

## 2017-05-10 RX ADMIN — FERROUS SULFATE TAB 325 MG (65 MG ELEMENTAL FE) 325 MG: 325 (65 FE) TAB at 08:20

## 2017-05-10 RX ADMIN — MAGNESIUM HYDROXIDE 30 ML: 400 SUSPENSION ORAL at 08:20

## 2017-05-10 RX ADMIN — Medication 1 CAPSULE: at 23:09

## 2017-05-10 RX ADMIN — DOCUSATE SODIUM -SENNOSIDES 1 TABLET: 50; 8.6 TABLET, COATED ORAL at 08:20

## 2017-05-10 RX ADMIN — INSULIN LISPRO 2 UNITS: 100 INJECTION, SOLUTION INTRAVENOUS; SUBCUTANEOUS at 12:14

## 2017-05-10 RX ADMIN — TRAMADOL HYDROCHLORIDE 25 MG: 50 TABLET, FILM COATED ORAL at 06:15

## 2017-05-10 RX ADMIN — DOCUSATE SODIUM 100 MG: 100 CAPSULE ORAL at 08:20

## 2017-05-10 RX ADMIN — Medication 5 ML: at 17:38

## 2017-05-11 LAB
ANION GAP BLD CALC-SCNC: 6 MMOL/L (ref 5–15)
BUN SERPL-MCNC: 21 MG/DL (ref 6–20)
BUN/CREAT SERPL: 19 (ref 12–20)
CALCIUM SERPL-MCNC: 8.6 MG/DL (ref 8.5–10.1)
CHLORIDE SERPL-SCNC: 110 MMOL/L (ref 97–108)
CO2 SERPL-SCNC: 27 MMOL/L (ref 21–32)
CREAT SERPL-MCNC: 1.11 MG/DL (ref 0.55–1.02)
ERYTHROCYTE [DISTWIDTH] IN BLOOD BY AUTOMATED COUNT: 16.1 % (ref 11.5–14.5)
GLUCOSE SERPL-MCNC: 107 MG/DL (ref 65–100)
HCT VFR BLD AUTO: 32.7 % (ref 35–47)
HGB BLD-MCNC: 10.1 G/DL (ref 11.5–16)
MCH RBC QN AUTO: 29.8 PG (ref 26–34)
MCHC RBC AUTO-ENTMCNC: 30.9 G/DL (ref 30–36.5)
MCV RBC AUTO: 96.5 FL (ref 80–99)
PLATELET # BLD AUTO: 240 K/UL (ref 150–400)
POTASSIUM SERPL-SCNC: 4.7 MMOL/L (ref 3.5–5.1)
RBC # BLD AUTO: 3.39 M/UL (ref 3.8–5.2)
SODIUM SERPL-SCNC: 143 MMOL/L (ref 136–145)
WBC # BLD AUTO: 6.5 K/UL (ref 3.6–11)

## 2017-05-11 PROCEDURE — 80048 BASIC METABOLIC PNL TOTAL CA: CPT | Performed by: PHYSICAL MEDICINE & REHABILITATION

## 2017-05-11 PROCEDURE — 74011250637 HC RX REV CODE- 250/637: Performed by: PHYSICAL MEDICINE & REHABILITATION

## 2017-05-11 PROCEDURE — 74011000258 HC RX REV CODE- 258: Performed by: PHYSICAL MEDICINE & REHABILITATION

## 2017-05-11 PROCEDURE — 85027 COMPLETE CBC AUTOMATED: CPT | Performed by: PHYSICAL MEDICINE & REHABILITATION

## 2017-05-11 PROCEDURE — 36415 COLL VENOUS BLD VENIPUNCTURE: CPT | Performed by: PHYSICAL MEDICINE & REHABILITATION

## 2017-05-11 PROCEDURE — 74011250636 HC RX REV CODE- 250/636: Performed by: PHYSICAL MEDICINE & REHABILITATION

## 2017-05-11 RX ORDER — WARFARIN 2 MG/1
2 TABLET ORAL EVERY EVENING
Status: COMPLETED | OUTPATIENT
Start: 2017-05-11 | End: 2017-05-11

## 2017-05-11 RX ORDER — LIDOCAINE 50 MG/G
1 PATCH TOPICAL EVERY 24 HOURS
Status: DISCONTINUED | OUTPATIENT
Start: 2017-05-11 | End: 2017-05-16 | Stop reason: HOSPADM

## 2017-05-11 RX ADMIN — FEBUXOSTAT 40 MG: 40 TABLET ORAL at 08:55

## 2017-05-11 RX ADMIN — CEFEPIME HYDROCHLORIDE 1 G: 1 INJECTION, POWDER, FOR SOLUTION INTRAMUSCULAR; INTRAVENOUS at 15:47

## 2017-05-11 RX ADMIN — DOCUSATE SODIUM 100 MG: 100 CAPSULE ORAL at 08:55

## 2017-05-11 RX ADMIN — PRAVASTATIN SODIUM 40 MG: 20 TABLET ORAL at 21:38

## 2017-05-11 RX ADMIN — METOPROLOL TARTRATE 25 MG: 25 TABLET ORAL at 21:38

## 2017-05-11 RX ADMIN — Medication 5 ML: at 05:36

## 2017-05-11 RX ADMIN — POLYETHYLENE GLYCOL 3350 17 G: 17 POWDER, FOR SOLUTION ORAL at 08:56

## 2017-05-11 RX ADMIN — DOCUSATE SODIUM -SENNOSIDES 1 TABLET: 50; 8.6 TABLET, COATED ORAL at 08:56

## 2017-05-11 RX ADMIN — Medication 1 CAPSULE: at 21:38

## 2017-05-11 RX ADMIN — ACETAMINOPHEN 650 MG: 325 TABLET ORAL at 11:53

## 2017-05-11 RX ADMIN — METOPROLOL TARTRATE 25 MG: 25 TABLET ORAL at 09:00

## 2017-05-11 RX ADMIN — Medication 5 ML: at 23:29

## 2017-05-11 RX ADMIN — WARFARIN SODIUM 2 MG: 2 TABLET ORAL at 17:32

## 2017-05-11 RX ADMIN — Medication 5 ML: at 15:40

## 2017-05-11 RX ADMIN — FERROUS SULFATE TAB 325 MG (65 MG ELEMENTAL FE) 325 MG: 325 (65 FE) TAB at 08:55

## 2017-05-11 RX ADMIN — DOCUSATE SODIUM 100 MG: 100 CAPSULE ORAL at 21:38

## 2017-05-11 RX ADMIN — TRAMADOL HYDROCHLORIDE 25 MG: 50 TABLET, FILM COATED ORAL at 10:28

## 2017-05-12 ENCOUNTER — APPOINTMENT (OUTPATIENT)
Dept: ULTRASOUND IMAGING | Age: 82
End: 2017-05-12
Attending: PHYSICAL MEDICINE & REHABILITATION

## 2017-05-12 PROCEDURE — 74011250636 HC RX REV CODE- 250/636: Performed by: PHYSICAL MEDICINE & REHABILITATION

## 2017-05-12 PROCEDURE — 74011636637 HC RX REV CODE- 636/637: Performed by: PHYSICAL MEDICINE & REHABILITATION

## 2017-05-12 PROCEDURE — 74011250637 HC RX REV CODE- 250/637: Performed by: PHYSICAL MEDICINE & REHABILITATION

## 2017-05-12 PROCEDURE — 74011000258 HC RX REV CODE- 258: Performed by: PHYSICAL MEDICINE & REHABILITATION

## 2017-05-12 PROCEDURE — 76770 US EXAM ABDO BACK WALL COMP: CPT

## 2017-05-12 RX ORDER — CYCLOBENZAPRINE HCL 10 MG
5 TABLET ORAL
Status: DISCONTINUED | OUTPATIENT
Start: 2017-05-12 | End: 2017-05-12

## 2017-05-12 RX ORDER — METAXALONE 800 MG/1
400 TABLET ORAL
Status: DISCONTINUED | OUTPATIENT
Start: 2017-05-12 | End: 2017-05-16 | Stop reason: HOSPADM

## 2017-05-12 RX ORDER — WARFARIN 2 MG/1
2 TABLET ORAL ONCE
Status: COMPLETED | OUTPATIENT
Start: 2017-05-12 | End: 2017-05-12

## 2017-05-12 RX ADMIN — TRAMADOL HYDROCHLORIDE 25 MG: 50 TABLET, FILM COATED ORAL at 16:23

## 2017-05-12 RX ADMIN — METOPROLOL TARTRATE 25 MG: 25 TABLET ORAL at 08:43

## 2017-05-12 RX ADMIN — FERROUS SULFATE TAB 325 MG (65 MG ELEMENTAL FE) 325 MG: 325 (65 FE) TAB at 08:43

## 2017-05-12 RX ADMIN — Medication 5 ML: at 13:01

## 2017-05-12 RX ADMIN — FEBUXOSTAT 40 MG: 40 TABLET ORAL at 08:47

## 2017-05-12 RX ADMIN — CEFEPIME HYDROCHLORIDE 1 G: 1 INJECTION, POWDER, FOR SOLUTION INTRAMUSCULAR; INTRAVENOUS at 16:14

## 2017-05-12 RX ADMIN — Medication 1 CAPSULE: at 20:57

## 2017-05-12 RX ADMIN — INSULIN LISPRO 2 UNITS: 100 INJECTION, SOLUTION INTRAVENOUS; SUBCUTANEOUS at 12:51

## 2017-05-12 RX ADMIN — TRAMADOL HYDROCHLORIDE 25 MG: 50 TABLET, FILM COATED ORAL at 04:47

## 2017-05-12 RX ADMIN — PANTOPRAZOLE SODIUM 40 MG: 40 TABLET, DELAYED RELEASE ORAL at 05:49

## 2017-05-12 RX ADMIN — DOCUSATE SODIUM 100 MG: 100 CAPSULE ORAL at 08:47

## 2017-05-12 RX ADMIN — WARFARIN SODIUM 2 MG: 2 TABLET ORAL at 17:22

## 2017-05-12 RX ADMIN — DOCUSATE SODIUM -SENNOSIDES 1 TABLET: 50; 8.6 TABLET, COATED ORAL at 08:43

## 2017-05-12 RX ADMIN — ACETAMINOPHEN 650 MG: 325 TABLET ORAL at 08:43

## 2017-05-12 RX ADMIN — Medication 5 ML: at 21:01

## 2017-05-12 RX ADMIN — Medication 5 ML: at 05:51

## 2017-05-12 RX ADMIN — PRAVASTATIN SODIUM 40 MG: 20 TABLET ORAL at 20:57

## 2017-05-12 RX ADMIN — DOCUSATE SODIUM 100 MG: 100 CAPSULE ORAL at 20:57

## 2017-05-12 RX ADMIN — METOPROLOL TARTRATE 25 MG: 25 TABLET ORAL at 20:59

## 2017-05-13 PROCEDURE — 74011250637 HC RX REV CODE- 250/637: Performed by: PHYSICAL MEDICINE & REHABILITATION

## 2017-05-13 RX ORDER — WARFARIN 2 MG/1
2 TABLET ORAL ONCE
Status: COMPLETED | OUTPATIENT
Start: 2017-05-13 | End: 2017-05-13

## 2017-05-13 RX ORDER — ZOLPIDEM TARTRATE 5 MG/1
5 TABLET ORAL
Status: DISCONTINUED | OUTPATIENT
Start: 2017-05-13 | End: 2017-05-16 | Stop reason: HOSPADM

## 2017-05-13 RX ADMIN — ZOLPIDEM TARTRATE 5 MG: 5 TABLET, FILM COATED ORAL at 20:23

## 2017-05-13 RX ADMIN — WARFARIN SODIUM 2 MG: 2 TABLET ORAL at 17:04

## 2017-05-13 RX ADMIN — Medication 1 CAPSULE: at 20:22

## 2017-05-13 RX ADMIN — FEBUXOSTAT 40 MG: 40 TABLET ORAL at 08:41

## 2017-05-13 RX ADMIN — FERROUS SULFATE TAB 325 MG (65 MG ELEMENTAL FE) 325 MG: 325 (65 FE) TAB at 08:42

## 2017-05-13 RX ADMIN — PRAVASTATIN SODIUM 40 MG: 20 TABLET ORAL at 20:23

## 2017-05-13 RX ADMIN — TRAMADOL HYDROCHLORIDE 25 MG: 50 TABLET, FILM COATED ORAL at 00:06

## 2017-05-13 RX ADMIN — POLYETHYLENE GLYCOL 3350 17 G: 17 POWDER, FOR SOLUTION ORAL at 08:40

## 2017-05-13 RX ADMIN — METOPROLOL TARTRATE 25 MG: 25 TABLET ORAL at 08:41

## 2017-05-13 RX ADMIN — DOCUSATE SODIUM -SENNOSIDES 1 TABLET: 50; 8.6 TABLET, COATED ORAL at 08:41

## 2017-05-13 RX ADMIN — PANTOPRAZOLE SODIUM 40 MG: 40 TABLET, DELAYED RELEASE ORAL at 05:27

## 2017-05-13 RX ADMIN — METOPROLOL TARTRATE 25 MG: 25 TABLET ORAL at 20:23

## 2017-05-13 RX ADMIN — DOCUSATE SODIUM 100 MG: 100 CAPSULE ORAL at 20:22

## 2017-05-13 RX ADMIN — DOCUSATE SODIUM 100 MG: 100 CAPSULE ORAL at 08:41

## 2017-05-13 RX ADMIN — Medication 5 ML: at 05:28

## 2017-05-14 PROCEDURE — 74011636637 HC RX REV CODE- 636/637: Performed by: PHYSICAL MEDICINE & REHABILITATION

## 2017-05-14 PROCEDURE — 74011250637 HC RX REV CODE- 250/637: Performed by: PHYSICAL MEDICINE & REHABILITATION

## 2017-05-14 RX ORDER — WARFARIN 2 MG/1
2 TABLET ORAL ONCE
Status: COMPLETED | OUTPATIENT
Start: 2017-05-14 | End: 2017-05-14

## 2017-05-14 RX ADMIN — Medication 1 CAPSULE: at 22:01

## 2017-05-14 RX ADMIN — ACETAMINOPHEN 650 MG: 325 TABLET ORAL at 16:38

## 2017-05-14 RX ADMIN — DOCUSATE SODIUM 100 MG: 100 CAPSULE ORAL at 22:01

## 2017-05-14 RX ADMIN — METOPROLOL TARTRATE 25 MG: 25 TABLET ORAL at 22:01

## 2017-05-14 RX ADMIN — ACETAMINOPHEN 650 MG: 325 TABLET ORAL at 02:50

## 2017-05-14 RX ADMIN — WARFARIN SODIUM 2 MG: 2 TABLET ORAL at 17:19

## 2017-05-14 RX ADMIN — DOCUSATE SODIUM -SENNOSIDES 1 TABLET: 50; 8.6 TABLET, COATED ORAL at 08:15

## 2017-05-14 RX ADMIN — ZOLPIDEM TARTRATE 5 MG: 5 TABLET, FILM COATED ORAL at 22:01

## 2017-05-14 RX ADMIN — PRAVASTATIN SODIUM 40 MG: 20 TABLET ORAL at 22:01

## 2017-05-14 RX ADMIN — INSULIN LISPRO 2 UNITS: 100 INJECTION, SOLUTION INTRAVENOUS; SUBCUTANEOUS at 22:03

## 2017-05-14 RX ADMIN — DOCUSATE SODIUM 100 MG: 100 CAPSULE ORAL at 08:14

## 2017-05-14 RX ADMIN — FEBUXOSTAT 40 MG: 40 TABLET ORAL at 08:18

## 2017-05-14 RX ADMIN — FERROUS SULFATE TAB 325 MG (65 MG ELEMENTAL FE) 325 MG: 325 (65 FE) TAB at 08:15

## 2017-05-14 RX ADMIN — POLYETHYLENE GLYCOL 3350 17 G: 17 POWDER, FOR SOLUTION ORAL at 08:13

## 2017-05-14 RX ADMIN — PANTOPRAZOLE SODIUM 40 MG: 40 TABLET, DELAYED RELEASE ORAL at 05:08

## 2017-05-14 RX ADMIN — METOPROLOL TARTRATE 25 MG: 25 TABLET ORAL at 08:14

## 2017-05-14 RX ADMIN — ACETAMINOPHEN 650 MG: 325 TABLET ORAL at 22:01

## 2017-05-15 LAB
ERYTHROCYTE [DISTWIDTH] IN BLOOD BY AUTOMATED COUNT: 15.1 % (ref 11.5–14.5)
HCT VFR BLD AUTO: 32.1 % (ref 35–47)
HGB BLD-MCNC: 10.3 G/DL (ref 11.5–16)
MCH RBC QN AUTO: 29.9 PG (ref 26–34)
MCHC RBC AUTO-ENTMCNC: 32.1 G/DL (ref 30–36.5)
MCV RBC AUTO: 93.3 FL (ref 80–99)
PLATELET # BLD AUTO: 225 K/UL (ref 150–400)
RBC # BLD AUTO: 3.44 M/UL (ref 3.8–5.2)
WBC # BLD AUTO: 6.5 K/UL (ref 3.6–11)

## 2017-05-15 PROCEDURE — 85027 COMPLETE CBC AUTOMATED: CPT | Performed by: PHYSICAL MEDICINE & REHABILITATION

## 2017-05-15 PROCEDURE — 36415 COLL VENOUS BLD VENIPUNCTURE: CPT | Performed by: PHYSICAL MEDICINE & REHABILITATION

## 2017-05-15 PROCEDURE — 74011250637 HC RX REV CODE- 250/637: Performed by: PHYSICAL MEDICINE & REHABILITATION

## 2017-05-15 RX ORDER — WARFARIN 2 MG/1
2 TABLET ORAL EVERY EVENING
Status: DISCONTINUED | OUTPATIENT
Start: 2017-05-15 | End: 2017-05-16 | Stop reason: HOSPADM

## 2017-05-15 RX ADMIN — FERROUS SULFATE TAB 325 MG (65 MG ELEMENTAL FE) 325 MG: 325 (65 FE) TAB at 08:50

## 2017-05-15 RX ADMIN — METOPROLOL TARTRATE 25 MG: 25 TABLET ORAL at 22:07

## 2017-05-15 RX ADMIN — FEBUXOSTAT 40 MG: 40 TABLET ORAL at 08:50

## 2017-05-15 RX ADMIN — TRAMADOL HYDROCHLORIDE 25 MG: 50 TABLET, FILM COATED ORAL at 17:29

## 2017-05-15 RX ADMIN — ACETAMINOPHEN 650 MG: 325 TABLET ORAL at 22:07

## 2017-05-15 RX ADMIN — DOCUSATE SODIUM 100 MG: 100 CAPSULE ORAL at 22:07

## 2017-05-15 RX ADMIN — METOPROLOL TARTRATE 25 MG: 25 TABLET ORAL at 08:50

## 2017-05-15 RX ADMIN — DOCUSATE SODIUM 100 MG: 100 CAPSULE ORAL at 08:50

## 2017-05-15 RX ADMIN — TRAMADOL HYDROCHLORIDE 25 MG: 50 TABLET, FILM COATED ORAL at 09:49

## 2017-05-15 RX ADMIN — DOCUSATE SODIUM -SENNOSIDES 1 TABLET: 50; 8.6 TABLET, COATED ORAL at 08:49

## 2017-05-15 RX ADMIN — PRAVASTATIN SODIUM 40 MG: 20 TABLET ORAL at 22:08

## 2017-05-15 RX ADMIN — POLYETHYLENE GLYCOL 3350 17 G: 17 POWDER, FOR SOLUTION ORAL at 08:49

## 2017-05-15 RX ADMIN — WARFARIN SODIUM 2 MG: 2 TABLET ORAL at 17:29

## 2017-05-15 RX ADMIN — PANTOPRAZOLE SODIUM 40 MG: 40 TABLET, DELAYED RELEASE ORAL at 06:25

## 2017-05-15 RX ADMIN — Medication 1 CAPSULE: at 22:07

## 2017-05-15 RX ADMIN — ACETAMINOPHEN 650 MG: 325 TABLET ORAL at 11:53

## 2017-05-16 VITALS
BODY MASS INDEX: 29.28 KG/M2 | HEIGHT: 63 IN | DIASTOLIC BLOOD PRESSURE: 70 MMHG | WEIGHT: 165.25 LBS | SYSTOLIC BLOOD PRESSURE: 137 MMHG | HEART RATE: 78 BPM

## 2017-05-16 PROCEDURE — 74011250637 HC RX REV CODE- 250/637: Performed by: PHYSICAL MEDICINE & REHABILITATION

## 2017-05-16 RX ADMIN — DOCUSATE SODIUM -SENNOSIDES 1 TABLET: 50; 8.6 TABLET, COATED ORAL at 08:16

## 2017-05-16 RX ADMIN — POLYETHYLENE GLYCOL 3350 17 G: 17 POWDER, FOR SOLUTION ORAL at 08:16

## 2017-05-16 RX ADMIN — PANTOPRAZOLE SODIUM 40 MG: 40 TABLET, DELAYED RELEASE ORAL at 05:21

## 2017-05-16 RX ADMIN — FEBUXOSTAT 40 MG: 40 TABLET ORAL at 08:16

## 2017-05-16 RX ADMIN — DOCUSATE SODIUM 100 MG: 100 CAPSULE ORAL at 08:16

## 2017-05-16 RX ADMIN — METOPROLOL TARTRATE 25 MG: 25 TABLET ORAL at 08:16

## 2017-05-16 RX ADMIN — FERROUS SULFATE TAB 325 MG (65 MG ELEMENTAL FE) 325 MG: 325 (65 FE) TAB at 08:16

## 2017-06-26 ENCOUNTER — OFFICE VISIT (OUTPATIENT)
Dept: CARDIOLOGY CLINIC | Age: 82
End: 2017-06-26

## 2017-06-26 VITALS
HEIGHT: 63 IN | SYSTOLIC BLOOD PRESSURE: 114 MMHG | HEART RATE: 60 BPM | DIASTOLIC BLOOD PRESSURE: 50 MMHG | WEIGHT: 165 LBS | RESPIRATION RATE: 16 BRPM | BODY MASS INDEX: 29.23 KG/M2 | OXYGEN SATURATION: 96 %

## 2017-06-26 DIAGNOSIS — N18.30 CKD (CHRONIC KIDNEY DISEASE) STAGE 3, GFR 30-59 ML/MIN (HCC): ICD-10-CM

## 2017-06-26 DIAGNOSIS — Z79.01 CHRONIC ANTICOAGULATION: ICD-10-CM

## 2017-06-26 DIAGNOSIS — I10 ESSENTIAL HYPERTENSION: ICD-10-CM

## 2017-06-26 DIAGNOSIS — I48.21 PERMANENT ATRIAL FIBRILLATION (HCC): Primary | ICD-10-CM

## 2017-06-26 RX ORDER — LANOLIN ALCOHOL/MO/W.PET/CERES
400 CREAM (GRAM) TOPICAL DAILY
COMMUNITY

## 2017-06-26 RX ORDER — FUROSEMIDE 40 MG/1
TABLET ORAL
Refills: 0 | COMMUNITY
Start: 2017-05-22

## 2017-06-26 NOTE — PROGRESS NOTES
Office Follow-up    NAME: Jackelin Doshi   :  1924  MRM:  [id number]    Date:  2017            Assessment:     Problem List  Date Reviewed: 2017          Codes Class Noted    Enteritis ICD-10-CM: K52.9  ICD-9-CM: 558.9  2017        CKD (chronic kidney disease) stage 3, GFR 30-59 ml/min (Chronic) ICD-10-CM: N18.3  ICD-9-CM: 585.3  2017        STEPHANI (obstructive sleep apnea) (Chronic) ICD-10-CM: G47.33  ICD-9-CM: 327.23  2017        A-fib ([facility] [address] (Chronic) ICD-10-CM: I48.91  ICD-9-CM: 427.31  2017        Chronic anticoagulation (Chronic) ICD-10-CM: Z79.01  ICD-9-CM: V58.61  2017        HTN (hypertension) (Chronic) ICD-10-CM: I10  ICD-9-CM: 401.9  10/8/2015        DM (diabetes mellitus) ([facility] [address] (Chronic) ICD-10-CM: E11.9  ICD-9-CM: 250.00  10/8/2015        High cholesterol (Chronic) ICD-10-CM: E78.00  ICD-9-CM: 272.0  10/8/2015                 Plan:     1. PAF: rate controlled. Continue lopressor. Continue Coumadin which she is taking anyways for DVT history (cautioned that she has known history of GI bleeding in the past). 2. Peripheral Edema: Continue Lasix. Monitor Cr.    3. HTN: Continue Toprol.    4. Dyslipidemia: Pravachol. 5. FU with me in 1 year. Subjective:     Jackelin Doshi, a 80y.o. year-old who presents for 6 month follow up. She was recently admitted in April for UTI and urosepsis. She had a prolonged course in the hospital.  She is now fully recovered. She underwent rehab. She lives with her daughter who is here with her. She does not do a whole lot at home however does try to take care of herself. She denies any symptoms of chest pain. The shortness of breath is at baseline.   Exam:     Physical Exam:  Visit Vitals    /50    Pulse 60    Resp 16    Ht 5' 3\" (1.6 m)    Wt 165 lb (74.8 kg)    SpO2 96%    BMI 29.23 kg/m2     General appearance - alert, well appearing, and in no distress  Mental status - affect appropriate to mood  Eyes - sclera anicteric, moist mucous membranes  Neck - supple, no significant adenopathy  Chest -right basal decline in air entry with right basal fine rales are present. There is no wheezes or rhonchi. Heart - normal rate, regular rhythm, normal S1, S2, no murmurs, rubs, clicks or gallops  Abdomen - soft, nontender, nondistended, no masses or organomegaly  Extremities - peripheral pulses normal, trace pedal edema  Skin - normal coloration  no rashes    Medications:     Current Outpatient Prescriptions   Medication Sig    magnesium oxide (MAG-OX) 400 mg tablet Take 400 mg by mouth daily.  furosemide (LASIX) 40 mg tablet Take one tablet by mouth at breakfast and lunch    warfarin (COUMADIN) 1 mg tablet Take 2 mg by mouth nightly. Indications: PREVENT THROMBOEMBOLISM IN CHRONIC ATRIAL FIBRILLATION    metoprolol tartrate (LOPRESSOR) 25 mg tablet Take 1 Tab by mouth two (2) times a day.  polyethylene glycol (MIRALAX) 17 gram packet Take 17 g by mouth two (2) times daily as needed.  senna-docusate (PERICOLACE) 8.6-50 mg per tablet Take 1 Tab by mouth two (2) times a day.  acetaminophen (TYLENOL) 325 mg tablet Take  by mouth every four (4) hours as needed for Pain.  triamcinolone acetonide (KENALOG) 0.1 % topical cream Apply  to affected area two (2) times daily as needed for Skin Irritation. use thin layer    nystatin (MYCOSTATIN) powder Apply  to affected area two (2) times daily as needed.  febuxostat (ULORIC) 40 mg tab tablet Take 40 mg by mouth daily.  ferrous gluconate (FERGON) 240 mg (27 mg iron) tablet Take 240 mg by mouth three (3) times daily (with meals).  ascorbic acid (VITAMIN C) 500 mg tablet Take 500 mg by mouth three (3) times daily.  cholecalciferol, vitamin D3, (VITAMIN D3) 2,000 unit tab Take 1 Tab by mouth daily.  folic acid 973 mcg tablet Take 400 mcg by mouth daily.  omeprazole (PRILOSEC) 40 mg capsule Take 40 mg by mouth daily.     traMADol (ULTRAM) 50 mg tablet Take 50 mg by mouth every six (6) hours as needed for Pain.  pravastatin (PRAVACHOL) 40 mg tablet Take 40 mg by mouth nightly.  trolamine salicylate (MYOFLEX) 10 % topical cream Apply  to affected area as needed. Apply to knees     No current facility-administered medications for this visit. Diagnostic Data Review:     EKG: AF, V rate 94, left axis, left anterior fascicular block     4/23/17: ECHO- LVEF 65%, G1DD; LA mod dilated; RA mild dilated; AV leaflet sclerosis; Trivial TR    10/13/15: ECHO- EF 55-60%, mod LA dilation, mild AR, mild TR, mild PaHTN    1/3/12: LEXISCAN CARDIOLITE- No evidence for ischemia or infarction at a submaximal level of exercise. Left ventricular ejection fraction measures 66%. Lab Review:   No results found for: CHOL, CHOLX, CHLST, CHOLV, 233041, HDL, LDL, LDLC, DLDLP, TGLX, TRIGL, TRIGP, CHHD, CHHDX  Lab Results   Component Value Date/Time    Creatinine 1.11 05/11/2017 05:29 AM     Lab Results   Component Value Date/Time    BUN 21 05/11/2017 05:29 AM     Lab Results   Component Value Date/Time    Potassium 4.7 05/11/2017 05:29 AM     Lab Results   Component Value Date/Time    Hemoglobin A1c 6.6 04/16/2017 09:49 PM     Lab Results   Component Value Date/Time    Hemoglobin (POC) 9.9 12/09/2015 11:00 AM    HGB 10.3 05/15/2017 04:55 AM     Lab Results   Component Value Date/Time    PLATELET 983 73/15/3702 04:55 AM     No results for input(s): CPK, CKMB, TROIQ in the last 72 hours. No lab exists for component: CKQMB, CPKMB             ___________________________________________________    Mckayla Aviles.  Kerri Goodrich MD, [facility]

## 2017-06-26 NOTE — PROGRESS NOTES
Chief Complaint   Patient presents with    Irregular Heart Beat     Paroxysmal Atrial Fibrillation    Swelling     Edema    Hypertension    Cholesterol Problem     Dyslipidemia    Follow-up     6 month follow up      Visit Vitals    /50    Pulse 60    Resp 16    Ht 5' 3\" (1.6 m)    Wt 165 lb (74.8 kg)    SpO2 96%    BMI 29.23 kg/m2

## 2019-02-10 NOTE — PROGRESS NOTES
[facility] [address], [place], [address] [facility]  [phone]      Medical Progress Note      NAME:         Nahid Mcdonald   :        1924  MRM:        [id number]    Date:          2017      Subjective: Patient has been seen and examined as a follow up for acute enteritis. Chart, labs, diagnostics reviewed. She continues to feel better with minimal aching abdominal discomfort. No fever or chills. No nausea or vomiting. Objective:    Vital Signs:    Visit Vitals    /52 (BP 1 Location: Left arm, BP Patient Position: At rest)    Pulse 86    Temp 97.7 °F (36.5 °C)    Resp 18    Ht 5' 3\" (1.6 m)    Wt 85.2 kg (187 lb 13.3 oz)    SpO2 97%    Breastfeeding No    BMI 33.27 kg/m2        No intake or output data in the 24 hours ending 17 1507     Physical Examination:    General:   Weak looking elderly female patient, not in any acute distress   Eyes:   pink conjunctivae, PERRLA with no discharge. ENT:   no ottorrhea or rhinorrhea with dry mucous membranes  Pulm: decreased but clear breath sounds without crackles or wheezes  Card:  no JVD or murmurs, has regular and normal S1, S2 without thrills, bruits or peripheral edema  Abd:  Soft, non tender, non-distended, normoactive bowel sounds   Musc:  No cyanosis, clubbing, atrophy or deformities. Skin:  No rashes. Upper extremity bruising. No ulcers. Neuro: Awake and alert.  Generally a non focal exam.   Psych:  Has a good insight and is oriented x 3    Current Facility-Administered Medications   Medication Dose Route Frequency    bisacodyl (DULCOLAX) suppository 10 mg  10 mg Rectal DAILY    0.9% sodium chloride infusion  100 mL/hr IntraVENous CONTINUOUS    sodium chloride (NS) flush 5-10 mL  5-10 mL IntraVENous Q8H    sodium chloride (NS) flush 5-10 mL  5-10 mL IntraVENous PRN    acetaminophen (TYLENOL) tablet 650 mg  650 mg Oral Q4H PRN    oxyCODONE-acetaminophen (PERCOCET) 5-325 mg per tablet 1 Tab  1 Tab Oral Q4H PRN    HYDROmorphone (PF) (DILAUDID) injection 0.5 mg  0.5 mg IntraVENous Q4H PRN    insulin lispro (HUMALOG) injection   SubCUTAneous AC&HS    glucose chewable tablet 16 g  4 Tab Oral PRN    dextrose (D50W) injection syrg 12.5-25 g  12.5-25 g IntraVENous PRN    glucagon (GLUCAGEN) injection 1 mg  1 mg IntraMUSCular PRN    febuxostat (ULORIC) tablet 40 mg  40 mg Oral DAILY    metoprolol succinate (TOPROL-XL) XL tablet 12.5 mg  12.5 mg Oral DAILY    pravastatin (PRAVACHOL) tablet 40 mg  40 mg Oral QHS    piperacillin-tazobactam (ZOSYN) 3.375 g in 0.9% sodium chloride (MBP/ADV) 100 mL  3.375 g IntraVENous Q12H    pantoprazole (PROTONIX) tablet 40 mg  40 mg Oral DAILY        Laboratory data and review:    Recent Labs      04/19/17   0511  04/18/17   0421  04/17/17   0334   WBC  9.1  11.6*  11.6*   HGB  8.9*  9.0*  9.4*   HCT  27.5*  27.9*  28.2*   PLT  243  225  214     Recent Labs      04/19/17   0511  04/18/17   0744  04/18/17   0421  04/17/17   0334  04/16/17   1820  04/16/17   1724   NA  146*   --   144  141   --   139   K  4.0   --   4.0  3.9   --   4.0   CL  111*   --   110*  106   --   101   CO2  21   --   22  27   --   27   GLU  79   --   112*  144*   --   208*   BUN  35*   --   42*  46*   --   46*   CREA  1.69*   --   1.90*  2.34*   --   2.25*   CA  7.9*   --   7.8*  8.1*   --   9.2   MG   --    --    --   1.6   --    --    PHOS   --    --    --   4.5   --    --    ALB   --    --    --    --    --   3.2*   SGOT   --    --    --    --    --   20   ALT   --    --    --    --    --   20   INR   --   3.8*   --   10.0*  9.1*   --      No components found for: [place]    Other Diagnostics:    CT scan abdomen and pelvis - Long segment area of small bowel thickening in the mid small bowel with  adjacent mesenteric edema and small amount of free fluid. Findings are nonspecific but likely infectious or inflammatory.  Ischemia is not excluded. No drainable fluid collection    Telemetry reviewed:   normal sinus rhythm    Assessment and Plan:    Enteritis (4/16/2017)/ SIRS (systemic inflammatory response syndrome) (HCC) (4/16/2017)/ Elevated lactic acid level (4/16/2017) POA: acute involving small bowel. Unclear if infectious, inflammatory vs ischemic. Seen by general surgery and GI. Better today. Advance diet as tolerated. Continue IV Zosyn. Chronic anticoagulation (4/16/2017)/Coagulopathy ([facility] [address] (4/16/2017): due to Warfarin. Sp vit K. INR better. No bleeding. Hgb stable. HTN (hypertension) (10/8/2015): BP well controlled. 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 Started on clears. Advance as tolerated to a diabetic diet, SSI per protocol    High cholesterol (10/8/2015): continue Simvastatin    ARF (acute renal failure) ([facility]) (4/16/2017)/ CKD (chronic kidney disease) stage 3, GFR 30-59 ml/min (4/16/2017): Cr continues to improve. Continue IV fluids. Monitor renal function    STEPHANI (obstructive sleep apnea) (4/16/2017): has declined to use CPAP    A-fib ([facility] (4/16/2017): chronic. Rate controlled. Continue metoprolol. Coumadin on hold due to coagulopathy. Resume once no further testing from GI is decided    Pyuria (4/17/2017): Urine Cx isolating Klebsiella pneumoniae.  Continue empiric IV Ceftriaxone     Code status: patient is FULL CODE    Total time spent for the patient's care: [address] discussed with: Patient, Family and Nursing Staff    Discussed:  Care Plan and D/C Planning    Prophylaxis:  H2B/PPI    Anticipated Disposition:  Rehab           ___________________________________________________    Attending Physician:   Nir Rai MD none

## 2024-11-19 NOTE — ED NOTES
Dr. Mony William at bedside for initial evaluation. PA had previously assessed patient at bedside. Realtime report printed after cardiac monitoring was initiated. Given to  to scan into EMR. Atrial fibrillation and flutter Atrial fibrillation and flutter Atrial fibrillation and flutter Atrial fibrillation and flutter Atrial fibrillation and flutter